# Patient Record
Sex: MALE | Employment: FULL TIME | URBAN - METROPOLITAN AREA
[De-identification: names, ages, dates, MRNs, and addresses within clinical notes are randomized per-mention and may not be internally consistent; named-entity substitution may affect disease eponyms.]

---

## 2019-04-11 ENCOUNTER — HOSPITAL ENCOUNTER (OUTPATIENT)
Dept: RADIOLOGY | Facility: MEDICAL CENTER | Age: 69
End: 2019-04-11
Attending: FAMILY MEDICINE
Payer: MEDICARE

## 2019-04-11 DIAGNOSIS — R52 PAIN: ICD-10-CM

## 2019-04-11 PROCEDURE — 73565 X-RAY EXAM OF KNEES: CPT

## 2021-01-25 DIAGNOSIS — Z23 NEED FOR VACCINATION: ICD-10-CM

## 2021-03-24 ENCOUNTER — HOSPITAL ENCOUNTER (OUTPATIENT)
Dept: LAB | Facility: MEDICAL CENTER | Age: 71
End: 2021-03-24
Attending: FAMILY MEDICINE
Payer: MEDICARE

## 2021-03-24 PROCEDURE — 80053 COMPREHEN METABOLIC PANEL: CPT

## 2021-03-24 PROCEDURE — 80061 LIPID PANEL: CPT

## 2021-03-24 PROCEDURE — 84153 ASSAY OF PSA TOTAL: CPT

## 2021-03-24 PROCEDURE — 36415 COLL VENOUS BLD VENIPUNCTURE: CPT

## 2021-03-24 PROCEDURE — 83036 HEMOGLOBIN GLYCOSYLATED A1C: CPT

## 2021-03-25 LAB
ALBUMIN SERPL BCP-MCNC: 4.2 G/DL (ref 3.2–4.9)
ALBUMIN/GLOB SERPL: 1.3 G/DL
ALP SERPL-CCNC: 74 U/L (ref 30–99)
ALT SERPL-CCNC: 10 U/L (ref 2–50)
ANION GAP SERPL CALC-SCNC: 10 MMOL/L (ref 7–16)
AST SERPL-CCNC: 17 U/L (ref 12–45)
BILIRUB SERPL-MCNC: 1 MG/DL (ref 0.1–1.5)
BUN SERPL-MCNC: 11 MG/DL (ref 8–22)
CALCIUM SERPL-MCNC: 9.5 MG/DL (ref 8.5–10.5)
CHLORIDE SERPL-SCNC: 105 MMOL/L (ref 96–112)
CHOLEST SERPL-MCNC: 117 MG/DL (ref 100–199)
CO2 SERPL-SCNC: 26 MMOL/L (ref 20–33)
CREAT SERPL-MCNC: 0.62 MG/DL (ref 0.5–1.4)
EST. AVERAGE GLUCOSE BLD GHB EST-MCNC: 120 MG/DL
FASTING STATUS PATIENT QL REPORTED: NORMAL
GLOBULIN SER CALC-MCNC: 3.3 G/DL (ref 1.9–3.5)
GLUCOSE SERPL-MCNC: 89 MG/DL (ref 65–99)
HBA1C MFR BLD: 5.8 % (ref 4–5.6)
HDLC SERPL-MCNC: 30 MG/DL
LDLC SERPL CALC-MCNC: 60 MG/DL
POTASSIUM SERPL-SCNC: 4.5 MMOL/L (ref 3.6–5.5)
PROT SERPL-MCNC: 7.5 G/DL (ref 6–8.2)
PSA SERPL-MCNC: 2.84 NG/ML (ref 0–4)
SODIUM SERPL-SCNC: 141 MMOL/L (ref 135–145)
TRIGL SERPL-MCNC: 133 MG/DL (ref 0–149)

## 2022-05-18 ENCOUNTER — HOSPITAL ENCOUNTER (EMERGENCY)
Facility: MEDICAL CENTER | Age: 72
End: 2022-05-18
Attending: EMERGENCY MEDICINE
Payer: MEDICARE

## 2022-05-18 VITALS
TEMPERATURE: 97 F | OXYGEN SATURATION: 94 % | SYSTOLIC BLOOD PRESSURE: 161 MMHG | RESPIRATION RATE: 18 BRPM | BODY MASS INDEX: 44.96 KG/M2 | WEIGHT: 279.76 LBS | DIASTOLIC BLOOD PRESSURE: 100 MMHG | HEART RATE: 60 BPM | HEIGHT: 66 IN

## 2022-05-18 DIAGNOSIS — I10 PRIMARY HYPERTENSION: ICD-10-CM

## 2022-05-18 LAB
ALBUMIN SERPL BCP-MCNC: 3.6 G/DL (ref 3.2–4.9)
ALBUMIN/GLOB SERPL: 1.1 G/DL
ALP SERPL-CCNC: 82 U/L (ref 30–99)
ALT SERPL-CCNC: 17 U/L (ref 2–50)
ANION GAP SERPL CALC-SCNC: 11 MMOL/L (ref 7–16)
AST SERPL-CCNC: 29 U/L (ref 12–45)
BASOPHILS # BLD AUTO: 0.7 % (ref 0–1.8)
BASOPHILS # BLD: 0.1 K/UL (ref 0–0.12)
BILIRUB SERPL-MCNC: 0.9 MG/DL (ref 0.1–1.5)
BUN SERPL-MCNC: 11 MG/DL (ref 8–22)
CALCIUM SERPL-MCNC: 8.5 MG/DL (ref 8.5–10.5)
CHLORIDE SERPL-SCNC: 104 MMOL/L (ref 96–112)
CO2 SERPL-SCNC: 21 MMOL/L (ref 20–33)
CREAT SERPL-MCNC: 0.68 MG/DL (ref 0.5–1.4)
EOSINOPHIL # BLD AUTO: 0.52 K/UL (ref 0–0.51)
EOSINOPHIL NFR BLD: 3.9 % (ref 0–6.9)
ERYTHROCYTE [DISTWIDTH] IN BLOOD BY AUTOMATED COUNT: 52.1 FL (ref 35.9–50)
GFR SERPLBLD CREATININE-BSD FMLA CKD-EPI: 99 ML/MIN/1.73 M 2
GLOBULIN SER CALC-MCNC: 3.3 G/DL (ref 1.9–3.5)
GLUCOSE SERPL-MCNC: 103 MG/DL (ref 65–99)
HCT VFR BLD AUTO: 46.6 % (ref 42–52)
HGB BLD-MCNC: 15.2 G/DL (ref 14–18)
IMM GRANULOCYTES # BLD AUTO: 0.06 K/UL (ref 0–0.11)
IMM GRANULOCYTES NFR BLD AUTO: 0.4 % (ref 0–0.9)
LYMPHOCYTES # BLD AUTO: 3.54 K/UL (ref 1–4.8)
LYMPHOCYTES NFR BLD: 26.5 % (ref 22–41)
MCH RBC QN AUTO: 28.4 PG (ref 27–33)
MCHC RBC AUTO-ENTMCNC: 32.6 G/DL (ref 33.7–35.3)
MCV RBC AUTO: 87.1 FL (ref 81.4–97.8)
MONOCYTES # BLD AUTO: 0.97 K/UL (ref 0–0.85)
MONOCYTES NFR BLD AUTO: 7.3 % (ref 0–13.4)
NEUTROPHILS # BLD AUTO: 8.17 K/UL (ref 1.82–7.42)
NEUTROPHILS NFR BLD: 61.2 % (ref 44–72)
NRBC # BLD AUTO: 0 K/UL
NRBC BLD-RTO: 0 /100 WBC
NT-PROBNP SERPL IA-MCNC: 299 PG/ML (ref 0–125)
PLATELET # BLD AUTO: 212 K/UL (ref 164–446)
PMV BLD AUTO: 11.1 FL (ref 9–12.9)
POTASSIUM SERPL-SCNC: 4 MMOL/L (ref 3.6–5.5)
PROT SERPL-MCNC: 6.9 G/DL (ref 6–8.2)
RBC # BLD AUTO: 5.35 M/UL (ref 4.7–6.1)
SODIUM SERPL-SCNC: 136 MMOL/L (ref 135–145)
WBC # BLD AUTO: 13.4 K/UL (ref 4.8–10.8)

## 2022-05-18 PROCEDURE — 99284 EMERGENCY DEPT VISIT MOD MDM: CPT

## 2022-05-18 PROCEDURE — 36415 COLL VENOUS BLD VENIPUNCTURE: CPT

## 2022-05-18 PROCEDURE — 83880 ASSAY OF NATRIURETIC PEPTIDE: CPT

## 2022-05-18 PROCEDURE — 80053 COMPREHEN METABOLIC PANEL: CPT

## 2022-05-18 PROCEDURE — 85025 COMPLETE CBC W/AUTO DIFF WBC: CPT

## 2022-05-18 RX ORDER — LISINOPRIL 40 MG/1
40 TABLET ORAL DAILY
Qty: 30 TABLET | Refills: 0 | Status: SHIPPED | OUTPATIENT
Start: 2022-05-18

## 2022-05-19 NOTE — ED TRIAGE NOTES
"Chief Complaint   Patient presents with   • Blood Pressure Problem     Pt states that his blood pressure is high. \"He had an episode on  and has been waiting for the cardiologist since then.\" Pt states that they have not been able to make an appointment until .     In triage, repeat blood pressures    Rt arm sittin/100  Lt arm sittin/98    Pt educated upon triage process and told to inform  staff of any changes in condition so that Pt may be reassessed. No further questions at this time. Pt sitting out in lobby.    "

## 2022-05-19 NOTE — ED NOTES
Pt is as triage note states. Pt ambulates from triage to TCS with a steady gait. Pt is AOx4 GCS 15. Pt is made aware to let staff know of any changes in current condition.

## 2022-05-19 NOTE — ED PROVIDER NOTES
"ED Provider Note    CHIEF COMPLAINT  Chief Complaint   Patient presents with   • Blood Pressure Problem     Pt states that his blood pressure is high. \"He had an episode on March 3rd and has been waiting for the cardiologist since then.\" Pt states that they have not been able to make an appointment until June 1st.       HPI  Eduardo Cuellar is a 72 y.o. male who presents for evaluation of elevated blood pressure.  The patient has a history of hypertension.  He is followed by PCP and is on lisinopril.  He is only on 20 mg of lisinopril.  Patient was referred by his PCP to a cardiologist and has an appointment in 2 weeks.  He has no report of any strokelike symptoms such as numbness weakness or tingling to the arms legs or face chest pain.  He does have some chronic leg swelling.    REVIEW OF SYSTEMS  See HPI for further details.  No chest pain numbness tingling weakness fevers chills dyspnea on exertion.  Positive for mild pedal edema all other systems are negative.     PAST MEDICAL HISTORY  No past medical history on file.  Hypertension  FAMILY HISTORY  Noncontributory    SOCIAL HISTORY  Social History     Socioeconomic History   • Marital status: Single   Tobacco Use   • Smoking status: Never Smoker   • Smokeless tobacco: Never Used   Substance and Sexual Activity   • Alcohol use: Not Currently   • Drug use: Never     Denies IV drugs  SURGICAL HISTORY  No past surgical history on file.  No major surgeries  CURRENT MEDICATIONS  Home Medications    **Home medications have not yet been reviewed for this encounter**     Lisinopril 20 mg once daily    ALLERGIES  No Known Allergies    PHYSICAL EXAM  VITAL SIGNS: BP (!) 155/98   Pulse 64   Temp 36.1 °C (96.9 °F) (Temporal)   Resp 16   Ht 1.676 m (5' 6\")   Wt (!) 127 kg (279 lb 12.2 oz)   SpO2 95%   BMI 45.16 kg/m²       Constitutional: Well developed, Well nourished, No acute distress, Non-toxic appearance.   HENT: Normocephalic, Atraumatic, Bilateral " external ears normal, Oropharynx moist, No oral exudates, Nose normal.   Eyes: PERRLA, EOMI, Conjunctiva normal, No discharge.   Neck: Normal range of motion, No tenderness, Supple, No stridor.   Cardiovascular: Normal heart rate, Normal rhythm, No murmurs, No rubs, No gallops.   Thorax & Lungs: Normal breath sounds, No respiratory distress, No wheezing, No chest tenderness.   Abdomen: Bowel sounds normal, Soft, No tenderness, No masses, No pulsatile masses.   Skin: Warm, Dry, No erythema, No rash.   Back: No tenderness, No CVA tenderness.   Extremities: Intact distal pulses, bilateral 2+ pitting edema   neurologic: Alert & oriented x 3, Normal motor function, Normal sensory function, No focal deficits noted.   Psychiatric: Anxious    Results for orders placed or performed during the hospital encounter of 05/18/22   CBC WITH DIFFERENTIAL   Result Value Ref Range    WBC 13.4 (H) 4.8 - 10.8 K/uL    RBC 5.35 4.70 - 6.10 M/uL    Hemoglobin 15.2 14.0 - 18.0 g/dL    Hematocrit 46.6 42.0 - 52.0 %    MCV 87.1 81.4 - 97.8 fL    MCH 28.4 27.0 - 33.0 pg    MCHC 32.6 (L) 33.7 - 35.3 g/dL    RDW 52.1 (H) 35.9 - 50.0 fL    Platelet Count 212 164 - 446 K/uL    MPV 11.1 9.0 - 12.9 fL    Neutrophils-Polys 61.20 44.00 - 72.00 %    Lymphocytes 26.50 22.00 - 41.00 %    Monocytes 7.30 0.00 - 13.40 %    Eosinophils 3.90 0.00 - 6.90 %    Basophils 0.70 0.00 - 1.80 %    Immature Granulocytes 0.40 0.00 - 0.90 %    Nucleated RBC 0.00 /100 WBC    Neutrophils (Absolute) 8.17 (H) 1.82 - 7.42 K/uL    Lymphs (Absolute) 3.54 1.00 - 4.80 K/uL    Monos (Absolute) 0.97 (H) 0.00 - 0.85 K/uL    Eos (Absolute) 0.52 (H) 0.00 - 0.51 K/uL    Baso (Absolute) 0.10 0.00 - 0.12 K/uL    Immature Granulocytes (abs) 0.06 0.00 - 0.11 K/uL    NRBC (Absolute) 0.00 K/uL   Comp Metabolic Panel   Result Value Ref Range    Sodium 136 135 - 145 mmol/L    Potassium 4.0 3.6 - 5.5 mmol/L    Chloride 104 96 - 112 mmol/L    Co2 21 20 - 33 mmol/L    Anion Gap 11.0 7.0 - 16.0     Glucose 103 (H) 65 - 99 mg/dL    Bun 11 8 - 22 mg/dL    Creatinine 0.68 0.50 - 1.40 mg/dL    Calcium 8.5 8.5 - 10.5 mg/dL    AST(SGOT) 29 12 - 45 U/L    ALT(SGPT) 17 2 - 50 U/L    Alkaline Phosphatase 82 30 - 99 U/L    Total Bilirubin 0.9 0.1 - 1.5 mg/dL    Albumin 3.6 3.2 - 4.9 g/dL    Total Protein 6.9 6.0 - 8.2 g/dL    Globulin 3.3 1.9 - 3.5 g/dL    A-G Ratio 1.1 g/dL   proBrain Natriuretic Peptide, NT   Result Value Ref Range    NT-proBNP 299 (H) 0 - 125 pg/mL   ESTIMATED GFR   Result Value Ref Range    GFR (CKD-EPI) 99 >60 mL/min/1.73 m 2       COURSE & MEDICAL DECISION MAKING  Pertinent Labs & Imaging studies reviewed. (See chart for details)  Patient presents here with chronically elevated blood pressure.  I reviewed his records and he has not had any blood work done for over a year.  I felt we needed to check some basic labs including a CBC and metabolic panel to check for kidney function.  This appears to be preserved.  I think that we can increase his lisinopril to 40 mg once daily and he can follow-up with cardiology in 2 weeks as planned    FINAL IMPRESSION  1.  Hypertension         Electronically signed by: Sergei Worthy M.D., 5/18/2022 6:07 PM

## 2022-05-19 NOTE — ED NOTES
Pt resting comfortable with famil. Pt aware to let staff know of any changes in current condition.

## 2022-05-19 NOTE — ED NOTES
Discharge instructions reviewed with PT. PT verbalized understanding. PT instructed to follow discharge instructions and if symptoms worsen to return to the ED. PT ambulated without assistance out of ED.

## 2025-03-21 ENCOUNTER — HOSPITAL ENCOUNTER (INPATIENT)
Facility: MEDICAL CENTER | Age: 75
End: 2025-03-21
Attending: STUDENT IN AN ORGANIZED HEALTH CARE EDUCATION/TRAINING PROGRAM | Admitting: STUDENT IN AN ORGANIZED HEALTH CARE EDUCATION/TRAINING PROGRAM
Payer: MEDICARE

## 2025-03-21 ENCOUNTER — HOSPITAL ENCOUNTER (OUTPATIENT)
Dept: RADIOLOGY | Facility: MEDICAL CENTER | Age: 75
End: 2025-03-21
Payer: MEDICARE

## 2025-03-21 DIAGNOSIS — K56.609 SBO (SMALL BOWEL OBSTRUCTION) (HCC): ICD-10-CM

## 2025-03-21 DIAGNOSIS — K81.0 GALLBLADDER ABSCESS: ICD-10-CM

## 2025-03-21 DIAGNOSIS — Z90.49 S/P CHOLECYSTECTOMY: ICD-10-CM

## 2025-03-21 PROBLEM — I82.409 DVT (DEEP VENOUS THROMBOSIS) (HCC): Status: ACTIVE | Noted: 2025-03-21

## 2025-03-21 PROBLEM — R65.10 SIRS (SYSTEMIC INFLAMMATORY RESPONSE SYNDROME) (HCC): Status: ACTIVE | Noted: 2025-03-21

## 2025-03-21 PROBLEM — T88.8XXA FLUID COLLECTION AT SURGICAL SITE: Status: ACTIVE | Noted: 2025-03-21

## 2025-03-21 PROBLEM — Z71.89 ACP (ADVANCE CARE PLANNING): Status: ACTIVE | Noted: 2025-03-21

## 2025-03-21 PROBLEM — R10.9 ABDOMINAL PAIN: Status: ACTIVE | Noted: 2025-03-21

## 2025-03-21 PROBLEM — I50.9 CHF (CONGESTIVE HEART FAILURE) (HCC): Status: ACTIVE | Noted: 2025-03-21

## 2025-03-21 LAB
ALBUMIN SERPL BCP-MCNC: 3 G/DL (ref 3.2–4.9)
ALBUMIN/GLOB SERPL: 0.8 G/DL
ALP SERPL-CCNC: 165 U/L (ref 30–99)
ALT SERPL-CCNC: 9 U/L (ref 2–50)
ANION GAP SERPL CALC-SCNC: 12 MMOL/L (ref 7–16)
AST SERPL-CCNC: 36 U/L (ref 12–45)
BASOPHILS # BLD AUTO: 0.6 % (ref 0–1.8)
BASOPHILS # BLD: 0.11 K/UL (ref 0–0.12)
BILIRUB SERPL-MCNC: 0.9 MG/DL (ref 0.1–1.5)
BUN SERPL-MCNC: 14 MG/DL (ref 8–22)
CALCIUM ALBUM COR SERPL-MCNC: 9.2 MG/DL (ref 8.5–10.5)
CALCIUM SERPL-MCNC: 8.4 MG/DL (ref 8.5–10.5)
CHLORIDE SERPL-SCNC: 98 MMOL/L (ref 96–112)
CO2 SERPL-SCNC: 27 MMOL/L (ref 20–33)
CREAT SERPL-MCNC: 0.73 MG/DL (ref 0.5–1.4)
EOSINOPHIL # BLD AUTO: 0.22 K/UL (ref 0–0.51)
EOSINOPHIL NFR BLD: 1.1 % (ref 0–6.9)
ERYTHROCYTE [DISTWIDTH] IN BLOOD BY AUTOMATED COUNT: 45 FL (ref 35.9–50)
GFR SERPLBLD CREATININE-BSD FMLA CKD-EPI: 95 ML/MIN/1.73 M 2
GLOBULIN SER CALC-MCNC: 3.9 G/DL (ref 1.9–3.5)
GLUCOSE SERPL-MCNC: 103 MG/DL (ref 65–99)
HCT VFR BLD AUTO: 41.5 % (ref 42–52)
HGB BLD-MCNC: 13.7 G/DL (ref 14–18)
IMM GRANULOCYTES # BLD AUTO: 0.6 K/UL (ref 0–0.11)
IMM GRANULOCYTES NFR BLD AUTO: 3.1 % (ref 0–0.9)
INR PPP: 1.09 (ref 0.87–1.13)
LACTATE SERPL-SCNC: 1.4 MMOL/L (ref 0.5–2)
LYMPHOCYTES # BLD AUTO: 2.82 K/UL (ref 1–4.8)
LYMPHOCYTES NFR BLD: 14.7 % (ref 22–41)
MCH RBC QN AUTO: 27.6 PG (ref 27–33)
MCHC RBC AUTO-ENTMCNC: 33 G/DL (ref 32.3–36.5)
MCV RBC AUTO: 83.7 FL (ref 81.4–97.8)
MONOCYTES # BLD AUTO: 1.16 K/UL (ref 0–0.85)
MONOCYTES NFR BLD AUTO: 6.1 % (ref 0–13.4)
NEUTROPHILS # BLD AUTO: 14.24 K/UL (ref 1.82–7.42)
NEUTROPHILS NFR BLD: 74.4 % (ref 44–72)
NRBC # BLD AUTO: 0 K/UL
NRBC BLD-RTO: 0 /100 WBC (ref 0–0.2)
PLATELET # BLD AUTO: 480 K/UL (ref 164–446)
PMV BLD AUTO: 9.7 FL (ref 9–12.9)
POTASSIUM SERPL-SCNC: 3.3 MMOL/L (ref 3.6–5.5)
PROCALCITONIN SERPL-MCNC: 0.19 NG/ML
PROT SERPL-MCNC: 6.9 G/DL (ref 6–8.2)
PROTHROMBIN TIME: 14.1 SEC (ref 12–14.6)
RBC # BLD AUTO: 4.96 M/UL (ref 4.7–6.1)
SODIUM SERPL-SCNC: 137 MMOL/L (ref 135–145)
WBC # BLD AUTO: 19.2 K/UL (ref 4.8–10.8)

## 2025-03-21 PROCEDURE — 80053 COMPREHEN METABOLIC PANEL: CPT

## 2025-03-21 PROCEDURE — 93005 ELECTROCARDIOGRAM TRACING: CPT | Mod: TC | Performed by: STUDENT IN AN ORGANIZED HEALTH CARE EDUCATION/TRAINING PROGRAM

## 2025-03-21 PROCEDURE — 770001 HCHG ROOM/CARE - MED/SURG/GYN PRIV*

## 2025-03-21 PROCEDURE — 36415 COLL VENOUS BLD VENIPUNCTURE: CPT

## 2025-03-21 PROCEDURE — 99223 1ST HOSP IP/OBS HIGH 75: CPT | Mod: 25,AI | Performed by: STUDENT IN AN ORGANIZED HEALTH CARE EDUCATION/TRAINING PROGRAM

## 2025-03-21 PROCEDURE — 87040 BLOOD CULTURE FOR BACTERIA: CPT

## 2025-03-21 PROCEDURE — 700105 HCHG RX REV CODE 258: Performed by: STUDENT IN AN ORGANIZED HEALTH CARE EDUCATION/TRAINING PROGRAM

## 2025-03-21 PROCEDURE — 85610 PROTHROMBIN TIME: CPT

## 2025-03-21 PROCEDURE — 700111 HCHG RX REV CODE 636 W/ 250 OVERRIDE (IP): Performed by: STUDENT IN AN ORGANIZED HEALTH CARE EDUCATION/TRAINING PROGRAM

## 2025-03-21 PROCEDURE — 99497 ADVNCD CARE PLAN 30 MIN: CPT | Performed by: STUDENT IN AN ORGANIZED HEALTH CARE EDUCATION/TRAINING PROGRAM

## 2025-03-21 PROCEDURE — 83605 ASSAY OF LACTIC ACID: CPT

## 2025-03-21 PROCEDURE — 84145 PROCALCITONIN (PCT): CPT

## 2025-03-21 PROCEDURE — 85025 COMPLETE CBC W/AUTO DIFF WBC: CPT

## 2025-03-21 RX ORDER — ONDANSETRON 2 MG/ML
4 INJECTION INTRAMUSCULAR; INTRAVENOUS EVERY 4 HOURS PRN
Status: DISCONTINUED | OUTPATIENT
Start: 2025-03-21 | End: 2025-04-03 | Stop reason: HOSPADM

## 2025-03-21 RX ORDER — OXYCODONE HYDROCHLORIDE 5 MG/1
2.5 TABLET ORAL
Refills: 0 | Status: DISCONTINUED | OUTPATIENT
Start: 2025-03-21 | End: 2025-04-03 | Stop reason: HOSPADM

## 2025-03-21 RX ORDER — ACETAMINOPHEN 325 MG/1
650 TABLET ORAL EVERY 6 HOURS PRN
Status: DISCONTINUED | OUTPATIENT
Start: 2025-03-21 | End: 2025-04-03 | Stop reason: HOSPADM

## 2025-03-21 RX ORDER — ONDANSETRON 4 MG/1
4 TABLET, ORALLY DISINTEGRATING ORAL EVERY 4 HOURS PRN
Status: DISCONTINUED | OUTPATIENT
Start: 2025-03-21 | End: 2025-04-03 | Stop reason: HOSPADM

## 2025-03-21 RX ORDER — HYDROMORPHONE HYDROCHLORIDE 1 MG/ML
0.25 INJECTION, SOLUTION INTRAMUSCULAR; INTRAVENOUS; SUBCUTANEOUS
Status: DISCONTINUED | OUTPATIENT
Start: 2025-03-21 | End: 2025-03-30

## 2025-03-21 RX ORDER — SODIUM CHLORIDE 9 MG/ML
INJECTION, SOLUTION INTRAVENOUS CONTINUOUS
Status: DISCONTINUED | OUTPATIENT
Start: 2025-03-21 | End: 2025-03-22

## 2025-03-21 RX ORDER — LABETALOL HYDROCHLORIDE 5 MG/ML
10 INJECTION, SOLUTION INTRAVENOUS EVERY 4 HOURS PRN
Status: DISCONTINUED | OUTPATIENT
Start: 2025-03-21 | End: 2025-04-03 | Stop reason: HOSPADM

## 2025-03-21 RX ORDER — CARVEDILOL 6.25 MG/1
6.25 TABLET ORAL 2 TIMES DAILY WITH MEALS
Status: DISCONTINUED | OUTPATIENT
Start: 2025-03-22 | End: 2025-04-03 | Stop reason: HOSPADM

## 2025-03-21 RX ORDER — POTASSIUM CHLORIDE 1500 MG/1
40 TABLET, EXTENDED RELEASE ORAL
Status: COMPLETED | OUTPATIENT
Start: 2025-03-22 | End: 2025-03-22

## 2025-03-21 RX ORDER — OXYCODONE HYDROCHLORIDE 5 MG/1
5 TABLET ORAL
Refills: 0 | Status: DISCONTINUED | OUTPATIENT
Start: 2025-03-21 | End: 2025-04-03 | Stop reason: HOSPADM

## 2025-03-21 RX ORDER — FUROSEMIDE 10 MG/ML
40 INJECTION INTRAMUSCULAR; INTRAVENOUS DAILY
Status: DISCONTINUED | OUTPATIENT
Start: 2025-03-21 | End: 2025-03-25

## 2025-03-21 RX ORDER — LOSARTAN POTASSIUM 50 MG/1
50 TABLET ORAL
Status: DISCONTINUED | OUTPATIENT
Start: 2025-03-22 | End: 2025-04-03 | Stop reason: HOSPADM

## 2025-03-21 RX ADMIN — SODIUM CHLORIDE: 9 INJECTION, SOLUTION INTRAVENOUS at 23:25

## 2025-03-21 RX ADMIN — FUROSEMIDE 40 MG: 10 INJECTION INTRAMUSCULAR; INTRAVENOUS at 23:22

## 2025-03-21 RX ADMIN — PIPERACILLIN AND TAZOBACTAM 3.38 G: 3; .375 INJECTION, POWDER, FOR SOLUTION INTRAVENOUS at 23:28

## 2025-03-21 SDOH — ECONOMIC STABILITY: TRANSPORTATION INSECURITY
IN THE PAST 12 MONTHS, HAS THE LACK OF TRANSPORTATION KEPT YOU FROM MEDICAL APPOINTMENTS OR FROM GETTING MEDICATIONS?: NO

## 2025-03-21 SDOH — ECONOMIC STABILITY: TRANSPORTATION INSECURITY
IN THE PAST 12 MONTHS, HAS LACK OF RELIABLE TRANSPORTATION KEPT YOU FROM MEDICAL APPOINTMENTS, MEETINGS, WORK OR FROM GETTING THINGS NEEDED FOR DAILY LIVING?: NO

## 2025-03-21 ASSESSMENT — LIFESTYLE VARIABLES
ALCOHOL_USE: YES
EVER FELT BAD OR GUILTY ABOUT YOUR DRINKING: NO
HAVE PEOPLE ANNOYED YOU BY CRITICIZING YOUR DRINKING: NO
TOTAL SCORE: 0
CONSUMPTION TOTAL: POSITIVE
AVERAGE NUMBER OF DAYS PER WEEK YOU HAVE A DRINK CONTAINING ALCOHOL: 0
HOW MANY TIMES IN THE PAST YEAR HAVE YOU HAD 5 OR MORE DRINKS IN A DAY: 1
TOTAL SCORE: 0
DOES PATIENT WANT TO STOP DRINKING: NO
EVER HAD A DRINK FIRST THING IN THE MORNING TO STEADY YOUR NERVES TO GET RID OF A HANGOVER: NO
ON A TYPICAL DAY WHEN YOU DRINK ALCOHOL HOW MANY DRINKS DO YOU HAVE: 0
HAVE YOU EVER FELT YOU SHOULD CUT DOWN ON YOUR DRINKING: NO
TOTAL SCORE: 0

## 2025-03-21 ASSESSMENT — SOCIAL DETERMINANTS OF HEALTH (SDOH)
WITHIN THE LAST YEAR, HAVE YOU BEEN AFRAID OF YOUR PARTNER OR EX-PARTNER?: NO
IN THE PAST 12 MONTHS, HAS THE ELECTRIC, GAS, OIL, OR WATER COMPANY THREATENED TO SHUT OFF SERVICE IN YOUR HOME?: NO
WITHIN THE LAST YEAR, HAVE YOU BEEN AFRAID OF YOUR PARTNER OR EX-PARTNER?: NO
WITHIN THE LAST YEAR, HAVE TO BEEN RAPED OR FORCED TO HAVE ANY KIND OF SEXUAL ACTIVITY BY YOUR PARTNER OR EX-PARTNER?: NO
WITHIN THE LAST YEAR, HAVE YOU BEEN KICKED, HIT, SLAPPED, OR OTHERWISE PHYSICALLY HURT BY YOUR PARTNER OR EX-PARTNER?: NO
WITHIN THE PAST 12 MONTHS, YOU WORRIED THAT YOUR FOOD WOULD RUN OUT BEFORE YOU GOT THE MONEY TO BUY MORE: NEVER TRUE
WITHIN THE LAST YEAR, HAVE YOU BEEN HUMILIATED OR EMOTIONALLY ABUSED IN OTHER WAYS BY YOUR PARTNER OR EX-PARTNER?: NO
WITHIN THE PAST 12 MONTHS, THE FOOD YOU BOUGHT JUST DIDN'T LAST AND YOU DIDN'T HAVE MONEY TO GET MORE: NEVER TRUE
WITHIN THE LAST YEAR, HAVE YOU BEEN KICKED, HIT, SLAPPED, OR OTHERWISE PHYSICALLY HURT BY YOUR PARTNER OR EX-PARTNER?: NO
WITHIN THE LAST YEAR, HAVE TO BEEN RAPED OR FORCED TO HAVE ANY KIND OF SEXUAL ACTIVITY BY YOUR PARTNER OR EX-PARTNER?: NO
WITHIN THE LAST YEAR, HAVE YOU BEEN HUMILIATED OR EMOTIONALLY ABUSED IN OTHER WAYS BY YOUR PARTNER OR EX-PARTNER?: NO

## 2025-03-21 ASSESSMENT — PAIN DESCRIPTION - PAIN TYPE: TYPE: ACUTE PAIN

## 2025-03-21 ASSESSMENT — PATIENT HEALTH QUESTIONNAIRE - PHQ9
SUM OF ALL RESPONSES TO PHQ9 QUESTIONS 1 AND 2: 0
2. FEELING DOWN, DEPRESSED, IRRITABLE, OR HOPELESS: NOT AT ALL
1. LITTLE INTEREST OR PLEASURE IN DOING THINGS: NOT AT ALL

## 2025-03-22 ENCOUNTER — APPOINTMENT (OUTPATIENT)
Dept: RADIOLOGY | Facility: MEDICAL CENTER | Age: 75
DRG: 862 | End: 2025-03-22
Attending: STUDENT IN AN ORGANIZED HEALTH CARE EDUCATION/TRAINING PROGRAM
Payer: MEDICARE

## 2025-03-22 PROBLEM — K56.609 SBO (SMALL BOWEL OBSTRUCTION) (HCC): Status: ACTIVE | Noted: 2025-03-22

## 2025-03-22 PROBLEM — Z86.711 HISTORY OF PULMONARY EMBOLISM: Status: ACTIVE | Noted: 2025-03-22

## 2025-03-22 LAB
ALBUMIN SERPL BCP-MCNC: 2.6 G/DL (ref 3.2–4.9)
ALBUMIN/GLOB SERPL: 0.7 G/DL
ALP SERPL-CCNC: 119 U/L (ref 30–99)
ALT SERPL-CCNC: 10 U/L (ref 2–50)
ANION GAP SERPL CALC-SCNC: 11 MMOL/L (ref 7–16)
AST SERPL-CCNC: 36 U/L (ref 12–45)
BILIRUB SERPL-MCNC: 1.1 MG/DL (ref 0.1–1.5)
BUN SERPL-MCNC: 14 MG/DL (ref 8–22)
CALCIUM ALBUM COR SERPL-MCNC: 9.3 MG/DL (ref 8.5–10.5)
CALCIUM SERPL-MCNC: 8.2 MG/DL (ref 8.5–10.5)
CHLORIDE SERPL-SCNC: 100 MMOL/L (ref 96–112)
CO2 SERPL-SCNC: 27 MMOL/L (ref 20–33)
CREAT SERPL-MCNC: 0.74 MG/DL (ref 0.5–1.4)
EKG IMPRESSION: NORMAL
ERYTHROCYTE [DISTWIDTH] IN BLOOD BY AUTOMATED COUNT: 46.4 FL (ref 35.9–50)
GFR SERPLBLD CREATININE-BSD FMLA CKD-EPI: 95 ML/MIN/1.73 M 2
GLOBULIN SER CALC-MCNC: 3.8 G/DL (ref 1.9–3.5)
GLUCOSE SERPL-MCNC: 97 MG/DL (ref 65–99)
GRAM STN SPEC: NORMAL
HCT VFR BLD AUTO: 40.3 % (ref 42–52)
HGB BLD-MCNC: 13.2 G/DL (ref 14–18)
LACTATE SERPL-SCNC: 1 MMOL/L (ref 0.5–2)
LACTATE SERPL-SCNC: 1.3 MMOL/L (ref 0.5–2)
LACTATE SERPL-SCNC: 1.8 MMOL/L (ref 0.5–2)
MAGNESIUM SERPL-MCNC: 2.1 MG/DL (ref 1.5–2.5)
MCH RBC QN AUTO: 27.8 PG (ref 27–33)
MCHC RBC AUTO-ENTMCNC: 32.8 G/DL (ref 32.3–36.5)
MCV RBC AUTO: 85 FL (ref 81.4–97.8)
NT-PROBNP SERPL IA-MCNC: 986 PG/ML (ref 0–125)
PHOSPHATE SERPL-MCNC: 3.4 MG/DL (ref 2.5–4.5)
PLATELET # BLD AUTO: 435 K/UL (ref 164–446)
PMV BLD AUTO: 9.4 FL (ref 9–12.9)
POTASSIUM SERPL-SCNC: 4.2 MMOL/L (ref 3.6–5.5)
PROT SERPL-MCNC: 6.4 G/DL (ref 6–8.2)
RBC # BLD AUTO: 4.74 M/UL (ref 4.7–6.1)
SIGNIFICANT IND 70042: NORMAL
SITE SITE: NORMAL
SODIUM SERPL-SCNC: 138 MMOL/L (ref 135–145)
SOURCE SOURCE: NORMAL
WBC # BLD AUTO: 19.4 K/UL (ref 4.8–10.8)

## 2025-03-22 PROCEDURE — 80053 COMPREHEN METABOLIC PANEL: CPT

## 2025-03-22 PROCEDURE — 87186 SC STD MICRODIL/AGAR DIL: CPT

## 2025-03-22 PROCEDURE — 71045 X-RAY EXAM CHEST 1 VIEW: CPT

## 2025-03-22 PROCEDURE — 99152 MOD SED SAME PHYS/QHP 5/>YRS: CPT

## 2025-03-22 PROCEDURE — 78226 HEPATOBILIARY SYSTEM IMAGING: CPT

## 2025-03-22 PROCEDURE — 36415 COLL VENOUS BLD VENIPUNCTURE: CPT

## 2025-03-22 PROCEDURE — 87077 CULTURE AEROBIC IDENTIFY: CPT

## 2025-03-22 PROCEDURE — 85027 COMPLETE CBC AUTOMATED: CPT

## 2025-03-22 PROCEDURE — 83880 ASSAY OF NATRIURETIC PEPTIDE: CPT

## 2025-03-22 PROCEDURE — 700111 HCHG RX REV CODE 636 W/ 250 OVERRIDE (IP): Mod: JZ | Performed by: STUDENT IN AN ORGANIZED HEALTH CARE EDUCATION/TRAINING PROGRAM

## 2025-03-22 PROCEDURE — 83605 ASSAY OF LACTIC ACID: CPT | Mod: 91

## 2025-03-22 PROCEDURE — 700111 HCHG RX REV CODE 636 W/ 250 OVERRIDE (IP): Mod: JZ

## 2025-03-22 PROCEDURE — 84100 ASSAY OF PHOSPHORUS: CPT

## 2025-03-22 PROCEDURE — 770001 HCHG ROOM/CARE - MED/SURG/GYN PRIV*

## 2025-03-22 PROCEDURE — 93010 ELECTROCARDIOGRAM REPORT: CPT | Performed by: INTERNAL MEDICINE

## 2025-03-22 PROCEDURE — A9270 NON-COVERED ITEM OR SERVICE: HCPCS | Performed by: STUDENT IN AN ORGANIZED HEALTH CARE EDUCATION/TRAINING PROGRAM

## 2025-03-22 PROCEDURE — 0W9G30Z DRAINAGE OF PERITONEAL CAVITY WITH DRAINAGE DEVICE, PERCUTANEOUS APPROACH: ICD-10-PCS | Performed by: RADIOLOGY

## 2025-03-22 PROCEDURE — 99233 SBSQ HOSP IP/OBS HIGH 50: CPT | Performed by: STUDENT IN AN ORGANIZED HEALTH CARE EDUCATION/TRAINING PROGRAM

## 2025-03-22 PROCEDURE — 700102 HCHG RX REV CODE 250 W/ 637 OVERRIDE(OP): Performed by: STUDENT IN AN ORGANIZED HEALTH CARE EDUCATION/TRAINING PROGRAM

## 2025-03-22 PROCEDURE — 83735 ASSAY OF MAGNESIUM: CPT

## 2025-03-22 PROCEDURE — 700111 HCHG RX REV CODE 636 W/ 250 OVERRIDE (IP): Mod: JZ | Performed by: RADIOLOGY

## 2025-03-22 PROCEDURE — 87070 CULTURE OTHR SPECIMN AEROBIC: CPT

## 2025-03-22 PROCEDURE — 87205 SMEAR GRAM STAIN: CPT

## 2025-03-22 PROCEDURE — 700105 HCHG RX REV CODE 258: Performed by: STUDENT IN AN ORGANIZED HEALTH CARE EDUCATION/TRAINING PROGRAM

## 2025-03-22 RX ORDER — FINASTERIDE 5 MG/1
5 TABLET, FILM COATED ORAL DAILY
COMMUNITY
Start: 2025-02-05

## 2025-03-22 RX ORDER — CARVEDILOL 6.25 MG/1
6.25 TABLET ORAL 2 TIMES DAILY
COMMUNITY
Start: 2025-02-05

## 2025-03-22 RX ORDER — OXYBUTYNIN CHLORIDE 10 MG/1
10 TABLET, EXTENDED RELEASE ORAL DAILY
COMMUNITY
Start: 2025-03-20

## 2025-03-22 RX ORDER — IBUPROFEN 200 MG
200 TABLET ORAL EVERY 6 HOURS PRN
COMMUNITY

## 2025-03-22 RX ORDER — MIDAZOLAM HYDROCHLORIDE 1 MG/ML
INJECTION INTRAMUSCULAR; INTRAVENOUS
Status: COMPLETED
Start: 2025-03-22 | End: 2025-03-22

## 2025-03-22 RX ORDER — TAMSULOSIN HYDROCHLORIDE 0.4 MG/1
0.4 CAPSULE ORAL DAILY
COMMUNITY
Start: 2025-01-05

## 2025-03-22 RX ORDER — MIDAZOLAM HYDROCHLORIDE 1 MG/ML
.5-2 INJECTION INTRAMUSCULAR; INTRAVENOUS PRN
Status: ACTIVE | OUTPATIENT
Start: 2025-03-22 | End: 2025-03-22

## 2025-03-22 RX ORDER — FUROSEMIDE 40 MG/1
40 TABLET ORAL DAILY
COMMUNITY
Start: 2025-02-05

## 2025-03-22 RX ORDER — ONDANSETRON 2 MG/ML
4 INJECTION INTRAMUSCULAR; INTRAVENOUS PRN
Status: ACTIVE | OUTPATIENT
Start: 2025-03-22 | End: 2025-03-22

## 2025-03-22 RX ORDER — LOSARTAN POTASSIUM 25 MG/1
25 TABLET ORAL DAILY
COMMUNITY
Start: 2025-03-01

## 2025-03-22 RX ORDER — SODIUM CHLORIDE 9 MG/ML
500 INJECTION, SOLUTION INTRAVENOUS
Status: ACTIVE | OUTPATIENT
Start: 2025-03-22 | End: 2025-03-22

## 2025-03-22 RX ORDER — ATORVASTATIN CALCIUM 20 MG/1
20 TABLET, FILM COATED ORAL DAILY
COMMUNITY
Start: 2025-02-19

## 2025-03-22 RX ADMIN — FENTANYL CITRATE 50 MCG: 50 INJECTION, SOLUTION INTRAMUSCULAR; INTRAVENOUS at 09:22

## 2025-03-22 RX ADMIN — OXYCODONE 5 MG: 5 TABLET ORAL at 20:30

## 2025-03-22 RX ADMIN — MIDAZOLAM HYDROCHLORIDE 1 MG: 1 INJECTION, SOLUTION INTRAMUSCULAR; INTRAVENOUS at 09:23

## 2025-03-22 RX ADMIN — PIPERACILLIN AND TAZOBACTAM 3.38 G: 3; .375 INJECTION, POWDER, FOR SOLUTION INTRAVENOUS at 22:49

## 2025-03-22 RX ADMIN — PIPERACILLIN AND TAZOBACTAM 3.38 G: 3; .375 INJECTION, POWDER, FOR SOLUTION INTRAVENOUS at 06:17

## 2025-03-22 RX ADMIN — MIDAZOLAM HYDROCHLORIDE 1 MG: 1 INJECTION, SOLUTION INTRAMUSCULAR; INTRAVENOUS at 09:22

## 2025-03-22 RX ADMIN — POTASSIUM CHLORIDE 40 MEQ: 1500 TABLET, EXTENDED RELEASE ORAL at 00:01

## 2025-03-22 RX ADMIN — PIPERACILLIN AND TAZOBACTAM 3.38 G: 3; .375 INJECTION, POWDER, FOR SOLUTION INTRAVENOUS at 14:32

## 2025-03-22 RX ADMIN — OXYCODONE 5 MG: 5 TABLET ORAL at 13:04

## 2025-03-22 RX ADMIN — FENTANYL CITRATE 50 MCG: 50 INJECTION, SOLUTION INTRAMUSCULAR; INTRAVENOUS at 09:30

## 2025-03-22 RX ADMIN — POTASSIUM CHLORIDE 40 MEQ: 1500 TABLET, EXTENDED RELEASE ORAL at 01:06

## 2025-03-22 ASSESSMENT — PAIN DESCRIPTION - PAIN TYPE
TYPE: ACUTE PAIN
TYPE: ACUTE PAIN;SURGICAL PAIN
TYPE: ACUTE PAIN
TYPE: ACUTE PAIN
TYPE: SURGICAL PAIN
TYPE: ACUTE PAIN
TYPE: ACUTE PAIN

## 2025-03-22 ASSESSMENT — COGNITIVE AND FUNCTIONAL STATUS - GENERAL
SUGGESTED CMS G CODE MODIFIER DAILY ACTIVITY: CH
MOVING FROM LYING ON BACK TO SITTING ON SIDE OF FLAT BED: A LITTLE
MOBILITY SCORE: 21
CLIMB 3 TO 5 STEPS WITH RAILING: A LITTLE
TURNING FROM BACK TO SIDE WHILE IN FLAT BAD: A LITTLE
DAILY ACTIVITIY SCORE: 24
SUGGESTED CMS G CODE MODIFIER MOBILITY: CJ

## 2025-03-22 ASSESSMENT — SOCIAL DETERMINANTS OF HEALTH (SDOH)
WITHIN THE LAST YEAR, HAVE TO BEEN RAPED OR FORCED TO HAVE ANY KIND OF SEXUAL ACTIVITY BY YOUR PARTNER OR EX-PARTNER?: NO
WITHIN THE LAST YEAR, HAVE YOU BEEN KICKED, HIT, SLAPPED, OR OTHERWISE PHYSICALLY HURT BY YOUR PARTNER OR EX-PARTNER?: NO
WITHIN THE LAST YEAR, HAVE YOU BEEN AFRAID OF YOUR PARTNER OR EX-PARTNER?: NO
WITHIN THE LAST YEAR, HAVE YOU BEEN HUMILIATED OR EMOTIONALLY ABUSED IN OTHER WAYS BY YOUR PARTNER OR EX-PARTNER?: NO

## 2025-03-22 NOTE — PROGRESS NOTES
Bedside report received.  Assessment complete.  A&O x 4. Patient calls appropriately.  Patient ambulates with standby assist. Bed alarm off.   Patient has 4/10 pain. Patient medicated per MAR.  Denies N&V. Tolerating NPO with sips diet.  + void, + flatus, - BM.  Patient denies SOB.  SCD's refused.  Review plan with of care with patient. Call light and personal belongings within reach. Hourly rounding in place. All needs met at this time.    Patient down to IR with transport.

## 2025-03-22 NOTE — PROGRESS NOTES
Pharmacy Medication Reconciliation      ~Medication reconciliation updated and complete per patient &  service Low  ~Allergies have been verified and updated     ~Is dispense history available in EPIC: yes  ~Patient home pharmacy :  [facility] [phone]      ~Anticoagulants (rivaroxaban, apixaban, edoxaban, dabigatran, warfarin, enoxaparin) taken in the last 14 days? YES  ~Anticoagulant: Eliquis 5mg, Last dose: 3/21/25 am    Patient had a 2 day course of Augmentin 875-125 BID started 3/15/25 completed 3/16/25 PM

## 2025-03-22 NOTE — CONSULTS
DATE OF SERVICE:  03/22/2025     GENERAL SURGICAL CONSULTATION     REQUESTING PHYSICIAN:  Dr. Yamilet Reed.     REASON FOR CONSULTATION:  The patient is a 75-year-old male who underwent a   laparoscopic cholecystectomy by Dr. Yadav on 05/13 at [facility].    He subsequently came in and was found to have a 10 cm fluid collection in the   gallbladder fossa.  He was admitted for IR drain, but was transferred to   [facility] because of IR availability over the weekend. He was started on IV   antibiotics.  He has already had his drain put in and I am coming to see him   for surgical followup.     PAST MEDICAL HISTORY:  ILLNESSES:  History of morbid obesity, congestive heart failure, pulmonary   embolism.  He is on Eliquis regularly.     PAST SURGICAL HISTORY:  Laparoscopic cholecystectomy.     MEDICATIONS:  Currently are Prinivil and Eliquis.     ALLERGIES:  None.     PHYSICAL EXAMINATION:  VITAL SIGNS:  He is afebrile.  HEENT:  He is anicteric.  NECK:  Supple.  ABDOMEN:  Soft and is mildly distended.  He has some tenderness in the right   upper quadrant.     LABORATORY DATA:  His white count on admission was 19,000, hemoglobin is 13.7.   INR was normal.  CT scan demonstrated a 10 cm fluid collection in the   gallbladder fossa.     IMPRESSION:  A 75-year-old male status post laparoscopic cholecystectomy with   postoperative fluid collection.     PLAN:  He has had a HIDA scan, which demonstrated no evidence of bile leak.    This most likely is postoperative fluid.  The drain has been placed.  Agree   with antibiotics until cultures are returned.  He can have a regular diet.  We   will follow along.        ______________________________  MD VIOLETTE HUYNH/ISAAC     DD:  03/22/2025 16:18  DT:  03/22/2025 16:39    Job#:  [id number]

## 2025-03-22 NOTE — ASSESSMENT & PLAN NOTE
Patient noted to have a 10 cm fluid collection at the gallbladder fossa  Recent cholecystitis status post cholecystectomy  General surgery was consulted, recommends evaluation for bile leak, IR consult for drainage of fluid.    S/p HIDA 3/22, negative for bile leak.  S/p  IR drain 3/22, output of 5 cc  Gallbladder cultures positive for Enterococcus faecalis, pansensitive.  DC IV Zosyn - switch to amoxicillin    3/27  Repeat CT A/P showed decreased gallbladder fossa fluid-filled collection s/p percutaneous drain placement.    3/31  Plan for repeat CT scan 4/2/2025 or sooner if drain output is consistently less than 10 mL per 24 hours per IR recs 3/29.  If drain output remains similar today, consider CT scan tomorrow.  S/p amoxicillin.  Monitor off antibiotics.  Continue multimodal pain regimen.

## 2025-03-22 NOTE — CARE PLAN
The patient is Stable - Low risk of patient condition declining or worsening    Shift Goals  Clinical Goals: Pain management  Patient Goals: Pain control, surgery    Progress made toward(s) clinical / shift goals:  Patient able to manage pain with medications in the MAR and non-pharm interventions. Patient able to verbalize understanding of safety measures and need to call for assistance.     Patient is not progressing towards the following goals:

## 2025-03-22 NOTE — PROGRESS NOTES
[facility] DIRECT ADMISSION REPORT  Transferring facility: [facility] ER  Transferring physician: PA    Chief complaint: Abdominal pain  Pertinent history & patient course: 75-year-old male with history of HFrEF 40%, DVT on Eliquis, recent laparoscopic cholecystectomy by Dr. Yadav 3/13/2025 presented to [facility] emergency room with complaint abdominal pain, shortness of breath.    Patient noted to have a 10 cm fluid collection around gallbladder fossa.  Case was discussed with on-call surgery at [facility] (Dr. Turcios) -- recommended IR drain and HIDA scan for evaluation of bile leak.  Apparently no IR services available all weekend at [facility], therefore transfer been requested.    Patient has been given morphine, Zosyn.  No IVF given due to concern of CHF    Pertinent imaging & lab results:   CTA chest: No definitive PE  CT abdomen pelvis: 10 cm fluid collection around gallbladder fossa    WBC 17.9 K, AST 34, ALT 13, alkaline phosphatase 136, lipase 14, total bilirubin 0.6    Consultants called prior to transfer and pertinent input from consultants:   Code Status:  per transferring provider, I personally verified with the transferring provider patient's code status and the transferring provider has confirmed this with the patient.  Reason for Transfer: IR  Further work up or recommendations requested prior to transfer: IR, HIDA    Patient accepted for transfer: Yes  Accepting [facility] Facility: [facility] - Nursing to notify the Triage Coordinator in the RTOC via Voalte or Phone ext. [phone] when patient arrives to the unit. The Triage Coordinator will assign the admitting provider.    Consultants to be called upon arrival: IR and surgery in AM  Admission status: Inpatient.   Floor requested: medsurg  If ICU transfer, name of intensivist case discussed with and pertinent input from critical care:     The admitting provider is the point of contact for questions or  concerns regarding patient's care.

## 2025-03-22 NOTE — PROGRESS NOTES
Hospital Medicine Daily Progress Note    Date of Service  3/22/2025    Chief Complaint  Eduardo Cuellar is a 75 y.o. male admitted 3/21/2025 with surgical site fluid collection    Hospital Course  Eduardo Cuellar is a 75 y.o. male who presented 3/21/2025 with abdominal pain.  Patient has a history of morbid obesity, heart failure with reduced ejection fraction, pulmonary embolism on Eliquis, recent cholecystectomy on March 13.  He was noted to have recent cholecystitis status post cholecystectomy and worsening abdominal distention,  pain, chills, and SOB     At outside facility ER, he was found WBC 18K, CT angiogram chest abdomen pelvis negative for pulmonary embolism, shows a 10 cm fluid collection within the gallbladder fossa , Small right pleural effusion with subsegmental passive atelectasis of the right lung base.     General surgery was consulted, recommends evaluation for bile leak, IR consult for drainage of fluid.      Luxembourgish spoken,  device was used.     Interval Problem Update  I have seen and examined the patient at bedside  Luxembourgish-speaking only, I used to the professional  device for communication    He underwent IR drain this morning.  He reported abdominal pain, no nausea or vomiting.     Follow-up fluid culture  Pending HIDA  Continue IV Zosyn    Wbc 19>19    On 6 L NC    I have discussed this patient's plan of care and discharge plan at IDT rounds today with Case Management, Nursing, Nursing leadership, and other members of the IDT team.    Consultants/Specialty  General surgery    Code Status  Full Code    Disposition  The patient is not medically cleared for discharge to home or a post-acute facility.      I have placed the appropriate orders for post-discharge needs.    Review of Systems   All 12 systems were reviewed and negative except as mentioned above      Physical Exam  Temp:  [36.7 °C (98.1 °F)-37 °C (98.6 °F)] 36.7 °C (98.1 °F)  Pulse:   [81-99] 93  Resp:  [14-47] 17  BP: ()/(60-81) 120/80  SpO2:  [92 %-96 %] 94 %    Physical Exam  Constitutional:       General: He is in acute distress.      Appearance: He is ill-appearing.   HENT:      Head: Normocephalic.      Nose: Nose normal.      Mouth/Throat:      Mouth: Mucous membranes are moist.   Eyes:      Conjunctiva/sclera: Conjunctivae normal.   Cardiovascular:      Rate and Rhythm: Normal rate and regular rhythm.      Pulses: Normal pulses.      Heart sounds: Normal heart sounds.   Abdominal:      General: Bowel sounds are normal.      Palpations: Abdomen is soft.      Tenderness: There is abdominal tenderness. There is no guarding.      Comments: KELLY drain in place   Musculoskeletal:         General: No swelling or tenderness.      Cervical back: Normal range of motion and neck supple.   Skin:     General: Skin is warm.   Neurological:      Mental Status: He is alert and oriented to person, place, and time. Mental status is at baseline.   Psychiatric:         Mood and Affect: Mood normal.         Fluids    Intake/Output Summary (Last 24 hours) at 3/22/2025 1214  Last data filed at 3/22/2025 0702  Gross per 24 hour   Intake 0 ml   Output 675 ml   Net -675 ml        Laboratory  Recent Labs     03/21/25 2218 03/22/25  1022   WBC 19.2* 19.4*   RBC 4.96 4.74   HEMOGLOBIN 13.7* 13.2*   HEMATOCRIT 41.5* 40.3*   MCV 83.7 85.0   MCH 27.6 27.8   MCHC 33.0 32.8   RDW 45.0 46.4   PLATELETCT 480* 435   MPV 9.7 9.4     Recent Labs     03/21/25 2218 03/22/25  1022   SODIUM 137 138   POTASSIUM 3.3* 4.2   CHLORIDE 98 100   CO2 27 27   GLUCOSE 103* 97   BUN 14 14   CREATININE 0.73 0.74   CALCIUM 8.4* 8.2*     Recent Labs     03/21/25 2218   INR 1.09               Imaging  CT-IMAGE-GUIDED DRAIN PERITONEAL   Final Result      1.  CT GUIDED PLACEMENT OF A PERCUTANEOUS DRAINAGE CATHETER IN A GALLBLADDER FOSSA FLUID COLLECTION.   2.  THE CURRENT PLAN IS TO MONITOR DRAINAGE OUTPUT AND OBTAIN A FOLLOWUP CT SCAN IN  5-7 DAYS IF CLINICALLY INDICATED.      NM-HEPATOBILIARY SCAN    (Results Pending)   DX-CHEST-PORTABLE (1 VIEW)    (Results Pending)        Assessment/Plan  * Fluid collection at surgical site  Assessment & Plan  Patient noted to have a 10 cm fluid collection at the gallbladder fossa  WBC 18K  Likely infected    General surgery was consulted, recommends evaluation for bile leak, IR consult for drainage of fluid.      S/P IR drain 3/22, fluid culture pending  Pending HIDA  Continue IV Zosyn    History of pulmonary embolism  Assessment & Plan  Home Eliquis on hold for possible procedure    SIRS (systemic inflammatory response syndrome) ([facility])  Assessment & Plan  SIRS criteria identified on my evaluation include:  Tachycardia, with heart rate greater than 90 BPM and Leukocytosis, with WBC greater than 12,000    DVT (deep venous thrombosis) ([facility])  Assessment & Plan  Eliquis held for now due to anticipated procedure in the morning  CTPA at outside hospital negative for pulmonary embolism    CHF (congestive heart failure) ([facility])  Assessment & Plan  History of heart failure with ejection fraction 45%  Noted to have lower extremity swelling and small pleural effusion seen on CAT scan   Bnp 986  Requires oxygen at admission    Patient was placed on both IV fluid and Lasix at admission  I ordered a chest x-ray, hold IV fluid  Continue to monitor      ACP (advance care planning)  Assessment & Plan  Was discussed by previous provider, full code         VTE prophylaxis: scd    I have performed a physical exam and reviewed and updated ROS and Plan today (3/22/2025). In review of yesterday's note (3/21/2025), there are no changes except as documented above.    I spent greater than 53 minutes for chart review, obtaining history independently, performing medically appropriate examination,  documenting , ordering medications, tests, or procedures, referring and communicating with other health care professionals, Independently  interpreting results and communicating results with patient/family/caregiver. More than 50% of time was spent in face-to-face clinical encounter.

## 2025-03-22 NOTE — OR SURGEON
Immediate Post- Operative Note        Findings: GB fossa fluid collection.      Procedure(s): CT guided drain placement.       Estimated Blood Loss: Less than 5 ml        Complications: None            3/22/2025     0937 AM     Zeb Mooney M.D.

## 2025-03-22 NOTE — PROGRESS NOTES
4 Eyes Skin Assessment Completed by Cheryle, RN and Holden CORBETT.    Head WDL  Ears WDL  Nose WDL  Mouth WDL  Neck WDL  Breast/Chest WDL  Shoulder Blades WDL  Spine WDL  (R) Arm/Elbow/Hand WDL  (L) Arm/Elbow/Hand WDL  Abdomen Incision previous incisions x3 and a benji and tag dressing  Groin WDL  Scrotum/Coccyx/Buttocks WDL  (R) Leg WDL  (L) Leg WDL  (R) Heel/Foot/Toe Redness and Blanching  (L) Heel/Foot/Toe Redness and Blanching          Devices In Places Blood Pressure Cuff, Pulse Ox, and Nasal Cannula      Interventions In Place NC W/Ear Foams and Pillows    Possible Skin Injury No    Pictures Uploaded Into Epic N/A  Wound Consult Placed N/A  RN Wound Prevention Protocol Ordered No

## 2025-03-22 NOTE — ASSESSMENT & PLAN NOTE
SIRS criteria identified on my evaluation include:  Tachycardia, with heart rate greater than 90 BPM and Leukocytosis, with WBC greater than 12,000  Improved

## 2025-03-22 NOTE — ASSESSMENT & PLAN NOTE
History of heart failure with ejection fraction 45%  Noted to have lower extremity swelling and small pleural effusion seen on CAT scan   Bnp 986 on admission.  S/p IV Lasix  Continue home p.o. Lasix.  Back to baseline home 3L NC.

## 2025-03-22 NOTE — PROGRESS NOTES
Pt presents to CT 4. Patient was consented by MD at bedside using  services Megan [id number], confirmed by this RN and consent at bedside. Pt transferred to CT 4 table in Supine position. Patient underwent a Gallbladder Fossa Drain Placement by Dr. Mooney. Procedure site was marked by MD and verified using imaging guidance. Pt placed on monitor, prepped and draped in a sterile fashion. Vitals were taken every 5 minutes and remained stable during procedure (see doc flow sheet for results). CO2 waveform capnography was monitored and remained WNL throughout procedure. Report called to Venus OTOOLE. Pt transported by Hospital with RN to T405.     Specimen: 60ml hand delivered to lab.    [employer] APDL 12F x 25cm  REF: [id number]  LOT: [id number]  EXP: 2027-11-29

## 2025-03-22 NOTE — H&P
Hospital Medicine History & Physical Note    Date of Service  3/21/2025    Primary Care Physician  Mohsen Tamasaby, M.D.    Consultants  General surgery    Code Status  Full Code    Chief Complaint  Fluid collection    History of Presenting Illness  Eduardo Cuellar is a 75 y.o. male who presented 3/21/2025 with abdominal pain.  Patient has a history of morbid obesity, heart failure with reduced ejection fraction, pulmonary embolism on Eliquis, recent cholecystectomy on March 13.  He was noted to have recent cholecystitis status post cholecystectomy.  Since then, he has noted to have abdominal distention, generalized abdominal pain, chills.  He states that it is difficult to breathe due to the abdominal pain.  He has been having difficulty sleeping due to the symptoms.    At outside facility ER,  Patient found a white blood cell count 18,000.  CT angiogram chest abdomen pelvis negative for pulmonary embolism, shows a 10 cm fluid collection within the gallbladder fossa that is favored to represent a postoperative seroma in the absence of clinical signs of infection. Serpinginous/spongiform morphology of gas within the gallbladder fossa collection raise the possibility of retained hemostatic material.  Small right pleural effusion with subsegmental passive atelectasis of the right lung base.    General surgery was consulted, recommends evaluation for bile leak, IR consult for drainage of fluid.  Patient given Zosyn and patient currently to [facility] for IR consult.    I discussed the plan of care with patient.    Review of Systems  ROS    Past Medical History   has no past medical history on file.    Surgical History   has no past surgical history on file.     Family History  family history is not on file.   Family history reviewed with patient. There is no family history that is pertinent to the chief complaint.     Social History   reports that he has never smoked. He has never used smokeless tobacco. He reports  "that he does not currently use alcohol. He reports that he does not use drugs.    Allergies  No Known Allergies    Medications  Prior to Admission Medications   Prescriptions Last Dose Informant Patient Reported? Taking?   lisinopril (PRINIVIL) 40 MG tablet   No No   Sig: Take 1 Tablet by mouth every day.      Facility-Administered Medications: None       Physical Exam  Temp:  [36.7 °C (98.1 °F)] 36.7 °C (98.1 °F)  Pulse:  [99] 99  Resp:  [16] 16  BP: (108)/(81) 108/81  SpO2:  [92 %] 92 %  Blood Pressure : 108/81   Temperature: 36.7 °C (98.1 °F)   Pulse: 99   Respiration: 16   Pulse Oximetry: 92 %       Physical Exam  Constitutional:       Appearance: Normal appearance. He is obese.   HENT:      Head: Normocephalic.      Mouth/Throat:      Mouth: Mucous membranes are moist.   Cardiovascular:      Rate and Rhythm: Regular rhythm. Tachycardia present.      Pulses: Normal pulses.   Pulmonary:      Comments: Bibasilar crackles heard  Abdominal:      General: Abdomen is flat. Bowel sounds are normal.      Palpations: Abdomen is soft.      Comments: Endorses pain upon palpation of epigastric area   Musculoskeletal:         General: Normal range of motion.      Cervical back: Neck supple.   Skin:     General: Skin is warm.   Neurological:      General: No focal deficit present.      Mental Status: He is alert and oriented to person, place, and time. Mental status is at baseline.   Psychiatric:         Mood and Affect: Mood normal.         Behavior: Behavior normal.         Thought Content: Thought content normal.         Judgment: Judgment normal.         Laboratory:          No results for input(s): \"ALTSGPT\", \"ASTSGOT\", \"ALKPHOSPHAT\", \"TBILIRUBIN\", \"DBILIRUBIN\", \"GAMMAGT\", \"AMYLASE\", \"LIPASE\", \"ALB\", \"PREALBUMIN\", \"GLUCOSE\" in the last 72 hours.      No results for input(s): \"NTPROBNP\" in the last 72 hours.      No results for input(s): \"TROPONINT\" in the last 72 hours.    Imaging:  No orders to display       no X-Ray or " EKG requiring interpretation    Assessment/Plan:  Justification for Admission Status  I anticipate this patient will require at least two midnights for appropriate medical management, necessitating inpatient admission because pt has a 10 cm fluid collection in the gallbladder fossa    Patient will need a Med/Surg bed on SURGICAL service .  The need is secondary to 10 cm fluid collection in the gallbladder fossa.    * Fluid collection at surgical site  Assessment & Plan  Patient noted to have a 10 cm fluid collection at the gallbladder fossa  Given Zosyn at outside hospital, continue Zosyn  Pain medications  HIDA scan to evaluate for bile leak  IR consult and treat order placed    ACP (advance care planning)  Assessment & Plan  16 minutes by discussing goals of care with patient.  I went over the process of CPR but he would want should patient have clinically deteriorate and have a cardiac arrest.  Patient states that he would like to be full code and to have everything done.    SIRS (systemic inflammatory response syndrome) ([facility])  Assessment & Plan  SIRS criteria identified on my evaluation include:  Tachycardia, with heart rate greater than 90 BPM and Leukocytosis, with WBC greater than 12,000    DVT (deep venous thrombosis) ([facility])  Assessment & Plan  Eliquis held for now due to anticipated procedure in the morning  CTPA at outside hospital negative for pulmonary embolism    CHF (congestive heart failure) ([facility])  Assessment & Plan  History of heart failure with ejection fraction 45%  Noted to have lower extremity swelling and small pleural effusion seen on CAT scan   Start Lasix IV daily  Resume home medications as needed        VTE prophylaxis: pharmacologic prophylaxis contraindicated due to IR drainage in am

## 2025-03-23 PROBLEM — J96.21 ACUTE ON CHRONIC RESPIRATORY FAILURE WITH HYPOXIA (HCC): Status: ACTIVE | Noted: 2025-03-23

## 2025-03-23 LAB
ANION GAP SERPL CALC-SCNC: 9 MMOL/L (ref 7–16)
BUN SERPL-MCNC: 14 MG/DL (ref 8–22)
CALCIUM SERPL-MCNC: 8 MG/DL (ref 8.5–10.5)
CHLORIDE SERPL-SCNC: 99 MMOL/L (ref 96–112)
CO2 SERPL-SCNC: 29 MMOL/L (ref 20–33)
CREAT SERPL-MCNC: 0.75 MG/DL (ref 0.5–1.4)
ERYTHROCYTE [DISTWIDTH] IN BLOOD BY AUTOMATED COUNT: 46.3 FL (ref 35.9–50)
GFR SERPLBLD CREATININE-BSD FMLA CKD-EPI: 95 ML/MIN/1.73 M 2
GLUCOSE SERPL-MCNC: 111 MG/DL (ref 65–99)
HCT VFR BLD AUTO: 37.5 % (ref 42–52)
HGB BLD-MCNC: 12.4 G/DL (ref 14–18)
MCH RBC QN AUTO: 28.2 PG (ref 27–33)
MCHC RBC AUTO-ENTMCNC: 33.1 G/DL (ref 32.3–36.5)
MCV RBC AUTO: 85.2 FL (ref 81.4–97.8)
PLATELET # BLD AUTO: 459 K/UL (ref 164–446)
PMV BLD AUTO: 10 FL (ref 9–12.9)
POTASSIUM SERPL-SCNC: 4.1 MMOL/L (ref 3.6–5.5)
RBC # BLD AUTO: 4.4 M/UL (ref 4.7–6.1)
SODIUM SERPL-SCNC: 137 MMOL/L (ref 135–145)
WBC # BLD AUTO: 16.1 K/UL (ref 4.8–10.8)

## 2025-03-23 PROCEDURE — 700111 HCHG RX REV CODE 636 W/ 250 OVERRIDE (IP): Mod: JZ | Performed by: STUDENT IN AN ORGANIZED HEALTH CARE EDUCATION/TRAINING PROGRAM

## 2025-03-23 PROCEDURE — 85027 COMPLETE CBC AUTOMATED: CPT

## 2025-03-23 PROCEDURE — A9270 NON-COVERED ITEM OR SERVICE: HCPCS | Performed by: STUDENT IN AN ORGANIZED HEALTH CARE EDUCATION/TRAINING PROGRAM

## 2025-03-23 PROCEDURE — 700105 HCHG RX REV CODE 258: Performed by: STUDENT IN AN ORGANIZED HEALTH CARE EDUCATION/TRAINING PROGRAM

## 2025-03-23 PROCEDURE — 700105 HCHG RX REV CODE 258: Performed by: SURGERY

## 2025-03-23 PROCEDURE — 770001 HCHG ROOM/CARE - MED/SURG/GYN PRIV*

## 2025-03-23 PROCEDURE — 700102 HCHG RX REV CODE 250 W/ 637 OVERRIDE(OP): Performed by: STUDENT IN AN ORGANIZED HEALTH CARE EDUCATION/TRAINING PROGRAM

## 2025-03-23 PROCEDURE — 700111 HCHG RX REV CODE 636 W/ 250 OVERRIDE (IP): Mod: JZ | Performed by: SURGERY

## 2025-03-23 PROCEDURE — 80048 BASIC METABOLIC PNL TOTAL CA: CPT

## 2025-03-23 PROCEDURE — 36415 COLL VENOUS BLD VENIPUNCTURE: CPT

## 2025-03-23 PROCEDURE — 99233 SBSQ HOSP IP/OBS HIGH 50: CPT | Performed by: STUDENT IN AN ORGANIZED HEALTH CARE EDUCATION/TRAINING PROGRAM

## 2025-03-23 RX ORDER — ENOXAPARIN SODIUM 100 MG/ML
40 INJECTION SUBCUTANEOUS EVERY 12 HOURS
Status: DISCONTINUED | OUTPATIENT
Start: 2025-03-24 | End: 2025-03-24

## 2025-03-23 RX ADMIN — LOSARTAN POTASSIUM 50 MG: 50 TABLET, FILM COATED ORAL at 05:09

## 2025-03-23 RX ADMIN — FUROSEMIDE 40 MG: 10 INJECTION INTRAMUSCULAR; INTRAVENOUS at 05:09

## 2025-03-23 RX ADMIN — PIPERACILLIN AND TAZOBACTAM 3.38 G: 3; .375 INJECTION, POWDER, FOR SOLUTION INTRAVENOUS at 14:04

## 2025-03-23 RX ADMIN — OXYCODONE 5 MG: 5 TABLET ORAL at 03:06

## 2025-03-23 RX ADMIN — OXYCODONE 5 MG: 5 TABLET ORAL at 11:58

## 2025-03-23 RX ADMIN — CARVEDILOL 6.25 MG: 6.25 TABLET, FILM COATED ORAL at 17:36

## 2025-03-23 RX ADMIN — PIPERACILLIN AND TAZOBACTAM 3.38 G: 3; .375 INJECTION, POWDER, FOR SOLUTION INTRAVENOUS at 22:17

## 2025-03-23 RX ADMIN — OXYCODONE 5 MG: 5 TABLET ORAL at 17:36

## 2025-03-23 RX ADMIN — PIPERACILLIN AND TAZOBACTAM 3.38 G: 3; .375 INJECTION, POWDER, FOR SOLUTION INTRAVENOUS at 05:11

## 2025-03-23 ASSESSMENT — PAIN DESCRIPTION - PAIN TYPE
TYPE: ACUTE PAIN;SURGICAL PAIN
TYPE: ACUTE PAIN
TYPE: ACUTE PAIN
TYPE: ACUTE PAIN;SURGICAL PAIN
TYPE: ACUTE PAIN

## 2025-03-23 ASSESSMENT — ENCOUNTER SYMPTOMS
PALPITATIONS: 0
VOMITING: 0
SHORTNESS OF BREATH: 0
ABDOMINAL PAIN: 1
WEAKNESS: 0
CHILLS: 0
FEVER: 0
HEADACHES: 0
NAUSEA: 0

## 2025-03-23 NOTE — PROGRESS NOTES
Radiology Progress Note   Author: SHAWNEE Roman Date & Time created: 3/23/2025  7:58 AM   Date of admission  3/21/2025  Note to reader: this note follows the APSO format rather than the historical SOAP format. Assessment and plan located at the top of the note for ease of use.    Chief Complaint  75 y.o. male admitted 3/21/2025 with abdominal pain      HPI  75-year-old male with past medical history significant for HFrEF, PE on Eliquis, morbid obesity, and recent cholecystitis s/p cholecystectomy (03/13/25) transferred from outside facility for CT finding of gallbladder fossa fluid collection after presenting with continued abdominal pain.  IR was consulted and patient underwent CT-guided gallbladder fossa fluid collection drain with IR Dr. Mooney on 03/22/2025. 80 mL old, cloudy, bloody fluid was drained at time of drain placement    Interval History:   03/23/2025 - GB fossa drain to RUQ with no output in the last 24 hours.  I flushed the drain with 10 mLs NS with moderate amount of maroon fluid returned in the suction bulb. Labs reviewed by me including WBC 16.1, H&H 12.4/37.5, creatinine 0.75.  GB fossa fluid cultures pending.  On ABX.  HIDA scan without evidence of biliary leak. Pt had many questions regarding plan of care and all questions answered to patient's satisfaction using  line.  I reviewed all IDT notes, reviewed all imaging, and discussed patient with bedside RN.     Assessment/Plan     Principal Problem:    Fluid collection at surgical site  Active Problems:    Abdominal pain    CHF (congestive heart failure) ([facility])    DVT (deep venous thrombosis) ([facility])    SIRS (systemic inflammatory response syndrome) ([facility])    ACP (advance care planning)    History of pulmonary embolism    SBO (small bowel obstruction) ([facility])      Plan IR  - Order placed to irrigate GB fossa drain with 10 ml of sterile saline each shift  - Fluid cultures pending   - surgery following   - Recommend follow up  CT scan in 5-7 days (~03/27/25)  - IR will continue to follow    -  -Thank you for allowing Interventional Radiology team to participate in the patients care, if any additional care or requests are needed in the future please do not hesitate to call or place IR order           Review of Systems  Physical Exam   Review of Systems   Constitutional:  Positive for malaise/fatigue. Negative for chills and fever.   Respiratory:  Negative for shortness of breath.    Cardiovascular:  Negative for chest pain and palpitations.   Gastrointestinal:  Positive for abdominal pain. Negative for nausea and vomiting.   Neurological:  Negative for weakness and headaches.      Vitals:    03/23/25 0714   BP: 113/74   Pulse: 85   Resp: 18   Temp: 36.6 °C (97.9 °F)   SpO2: 90%        Physical Exam  Constitutional:       General: He is not in acute distress.  Cardiovascular:      Rate and Rhythm: Normal rate.      Pulses: Normal pulses.   Pulmonary:      Effort: Pulmonary effort is normal. No respiratory distress.   Abdominal:      Tenderness: There is abdominal tenderness.      Comments: IR drain to RUQ   Skin:     General: Skin is warm and dry.   Neurological:      General: No focal deficit present.      Mental Status: He is alert and oriented to person, place, and time.   Psychiatric:         Mood and Affect: Mood normal.         Behavior: Behavior normal.             Labs    Recent Labs     03/21/25 2218 03/22/25  1022 03/23/25  0042   WBC 19.2* 19.4* 16.1*   RBC 4.96 4.74 4.40*   HEMOGLOBIN 13.7* 13.2* 12.4*   HEMATOCRIT 41.5* 40.3* 37.5*   MCV 83.7 85.0 85.2   MCH 27.6 27.8 28.2   MCHC 33.0 32.8 33.1   RDW 45.0 46.4 46.3   PLATELETCT 480* 435 459*   MPV 9.7 9.4 10.0     Recent Labs     03/21/25 2218 03/22/25  1022 03/23/25  0042   SODIUM 137 138 137   POTASSIUM 3.3* 4.2 4.1   CHLORIDE 98 100 99   CO2 27 27 29   GLUCOSE 103* 97 111*   BUN 14 14 14   CREATININE 0.73 0.74 0.75   CALCIUM 8.4* 8.2* 8.0*     Recent Labs      "03/21/25  2218 03/22/25  1022 03/23/25  0042   ALBUMIN 3.0* 2.6*  --    TBILIRUBIN 0.9 1.1  --    ALKPHOSPHAT 165* 119*  --    TOTPROTEIN 6.9 6.4  --    ALTSGPT 9 10  --    ASTSGOT 36 36  --    CREATININE 0.73 0.74 0.75     DX-CHEST-PORTABLE (1 VIEW)   Final Result      Findings suggestive of pulmonary edema with elevation of the right hemidiaphragm and bibasilar atelectasis/infiltrate.      NM-HEPATOBILIARY SCAN   Final Result      No scintigraphy evidence of bile leak during the 60 minute imaging.      CT-IMAGE-GUIDED DRAIN PERITONEAL   Final Result      1.  CT GUIDED PLACEMENT OF A PERCUTANEOUS DRAINAGE CATHETER IN A GALLBLADDER FOSSA FLUID COLLECTION.   2.  THE CURRENT PLAN IS TO MONITOR DRAINAGE OUTPUT AND OBTAIN A FOLLOWUP CT SCAN IN 5-7 DAYS IF CLINICALLY INDICATED.        INR   Date Value Ref Range Status   03/21/2025 1.09 0.87 - 1.13 Final     Comment:     INR - Non-therapeutic Reference Range: 0.87-1.13  INR - Therapeutic Reference Range: 2.0-4.0       No results found for: \"POCINR\"     Intake/Output Summary (Last 24 hours) at 3/23/2025 0758  Last data filed at 3/23/2025 0714  Gross per 24 hour   Intake 0 ml   Output 0 ml   Net 0 ml      I have personally reviewed the above labs and imaging      I have performed a physical exam and reviewed and updated ROS and Plan today (3/23/2025).     54 minutes in directly providing and coordinating care and extensive data review.  No time overlap and excludes procedures.   "

## 2025-03-23 NOTE — PROGRESS NOTES
Derma referral done    Please offer Same Day       Surgical Daily Progress Note    Date of Service  3/23/2025    Chief Complaint  75 y.o. male admitted 3/21/2025 with Medical (post-op complications)    Interval Events  Doing better. IR drain scant. Tolerating diet.     Review of Systems  Review of Systems   Gastrointestinal:  Positive for abdominal pain.        Vital Signs for last 24 hours  Temp:  [36.1 °C (97 °F)-37.3 °C (99.1 °F)] 36.6 °C (97.9 °F)  Pulse:  [85-96] 85  Resp:  [17-18] 18  BP: ()/(66-87) 113/74  SpO2:  [90 %-96 %] 90 %    Hemodynamic parameters for last 24 hours       Respiratory Data     Respiration: 18, Pulse Oximetry: 90 %        RUL Breath Sounds: Clear, RML Breath Sounds: Diminished, RLL Breath Sounds: Diminished, SAMSON Breath Sounds: Clear, LLL Breath Sounds: Diminished    Physical Exam  Physical Exam  Pulmonary:      Effort: Pulmonary effort is normal.   Abdominal:      General: There is no distension.      Palpations: Abdomen is soft.      Tenderness: There is abdominal tenderness.      Comments: KELLY serosang   Skin:     General: Skin is warm.   Neurological:      General: No focal deficit present.      Mental Status: He is alert.         Laboratory  Recent Results (from the past 24 hours)   LACTIC ACID    Collection Time: 03/22/25 10:22 AM   Result Value Ref Range    Lactic Acid 1.0 0.5 - 2.0 mmol/L   Comp Metabolic Panel    Collection Time: 03/22/25 10:22 AM   Result Value Ref Range    Sodium 138 135 - 145 mmol/L    Potassium 4.2 3.6 - 5.5 mmol/L    Chloride 100 96 - 112 mmol/L    Co2 27 20 - 33 mmol/L    Anion Gap 11.0 7.0 - 16.0    Glucose 97 65 - 99 mg/dL    Bun 14 8 - 22 mg/dL    Creatinine 0.74 0.50 - 1.40 mg/dL    Calcium 8.2 (L) 8.5 - 10.5 mg/dL    Correct Calcium 9.3 8.5 - 10.5 mg/dL    AST(SGOT) 36 12 - 45 U/L    ALT(SGPT) 10 2 - 50 U/L    Alkaline Phosphatase 119 (H) 30 - 99 U/L    Total Bilirubin 1.1 0.1 - 1.5 mg/dL    Albumin 2.6 (L) 3.2 - 4.9 g/dL    Total Protein 6.4 6.0 - 8.2 g/dL    Globulin 3.8 (H) 1.9 - 3.5 g/dL     A-G Ratio 0.7 g/dL   CBC WITHOUT DIFFERENTIAL    Collection Time: 03/22/25 10:22 AM   Result Value Ref Range    WBC 19.4 (H) 4.8 - 10.8 K/uL    RBC 4.74 4.70 - 6.10 M/uL    Hemoglobin 13.2 (L) 14.0 - 18.0 g/dL    Hematocrit 40.3 (L) 42.0 - 52.0 %    MCV 85.0 81.4 - 97.8 fL    MCH 27.8 27.0 - 33.0 pg    MCHC 32.8 32.3 - 36.5 g/dL    RDW 46.4 35.9 - 50.0 fL    Platelet Count 435 164 - 446 K/uL    MPV 9.4 9.0 - 12.9 fL   MAGNESIUM    Collection Time: 03/22/25 10:22 AM   Result Value Ref Range    Magnesium 2.1 1.5 - 2.5 mg/dL   PHOSPHORUS    Collection Time: 03/22/25 10:22 AM   Result Value Ref Range    Phosphorus 3.4 2.5 - 4.5 mg/dL   ESTIMATED GFR    Collection Time: 03/22/25 10:22 AM   Result Value Ref Range    GFR (CKD-EPI) 95 >60 mL/min/1.73 m 2   Basic Metabolic Panel    Collection Time: 03/23/25 12:42 AM   Result Value Ref Range    Sodium 137 135 - 145 mmol/L    Potassium 4.1 3.6 - 5.5 mmol/L    Chloride 99 96 - 112 mmol/L    Co2 29 20 - 33 mmol/L    Glucose 111 (H) 65 - 99 mg/dL    Bun 14 8 - 22 mg/dL    Creatinine 0.75 0.50 - 1.40 mg/dL    Calcium 8.0 (L) 8.5 - 10.5 mg/dL    Anion Gap 9.0 7.0 - 16.0   CBC WITHOUT DIFFERENTIAL    Collection Time: 03/23/25 12:42 AM   Result Value Ref Range    WBC 16.1 (H) 4.8 - 10.8 K/uL    RBC 4.40 (L) 4.70 - 6.10 M/uL    Hemoglobin 12.4 (L) 14.0 - 18.0 g/dL    Hematocrit 37.5 (L) 42.0 - 52.0 %    MCV 85.2 81.4 - 97.8 fL    MCH 28.2 27.0 - 33.0 pg    MCHC 33.1 32.3 - 36.5 g/dL    RDW 46.3 35.9 - 50.0 fL    Platelet Count 459 (H) 164 - 446 K/uL    MPV 10.0 9.0 - 12.9 fL   ESTIMATED GFR    Collection Time: 03/23/25 12:42 AM   Result Value Ref Range    GFR (CKD-EPI) 95 >60 mL/min/1.73 m 2       Fluids    Intake/Output Summary (Last 24 hours) at 3/23/2025 0943  Last data filed at 3/23/2025 0714  Gross per 24 hour   Intake 0 ml   Output 0 ml   Net 0 ml       Core Measures & Quality Metrics  Core Measures & Quality Metrics  SHIRAZ Score  ETOH Screening    Assessment/Plan  * Fluid  collection at surgical site- (present on admission)  Assessment & Plan  Post op fluid collection in GB fossa  3/22 IR drain placed  Cx pending  IV abx  HIDA neg        Discussed patient condition with Patient.  CRITICAL CARE TIME EXCLUDING PROCEDURES: 20    minutes

## 2025-03-23 NOTE — CARE PLAN
The patient is Stable - Low risk of patient condition declining or worsening    Shift Goals  Clinical Goals: pain management, continue to wean O2, drain management  Patient Goals: sleep    Progress made toward(s) clinical / shift goals:  Patient able to manage pain with medications on the MAR and non-pharm interventions. Patient able to voice understanding of need to call prior to ambulation due to safety. Patient also able to verbalize need and understanding of drain and IV fluid    Patient is not progressing towards the following goals:

## 2025-03-23 NOTE — PROGRESS NOTES
"Assumed care of patient at 1845. Bedside report received. Assessment complete.  AA&Ox4. Denies CP/SOB.  Reporting 8/10 pain. Medicated per MAR.   Educated patient regarding pharmacologic and non pharmacologic modalities for pain management.  Skin per flowsheet.  Tolerating cardiac diet. Denies N/V.  + void. - BM. Last BM 3/21 PTA  Pt ambulates SBA     All needs met at this time. Call light within reach. Pt calls appropriately. Bed low and locked, non skid socks in place. Hourly rounding in place.    BP (!) 140/80   Pulse 87   Temp 37.1 °C (98.8 °F) (Temporal)   Resp 18   Ht 1.676 m (5' 6\")   Wt 115 kg (254 lb 3.1 oz)   SpO2 93%   BMI 41.03 kg/m²     "

## 2025-03-23 NOTE — PROGRESS NOTES
Hospital Medicine Daily Progress Note    Date of Service  3/23/2025    Chief Complaint  Eduardo Cuellar is a 75 y.o. male admitted 3/21/2025 with surgical site fluid collection    Hospital Course  Eduardo Cuellar is a 75 y.o. male who presented 3/21/2025 with abdominal pain.  Patient has a history of morbid obesity, heart failure with reduced ejection fraction, pulmonary embolism on Eliquis, recent cholecystectomy on March 13.  He was noted to have recent cholecystitis status post cholecystectomy and worsening abdominal distention,  pain, chills, and SOB     At outside facility ER, he was found WBC 18K, CT angiogram chest abdomen pelvis negative for pulmonary embolism, shows a 10 cm fluid collection within the gallbladder fossa , Small right pleural effusion with subsegmental passive atelectasis of the right lung base.     General surgery was consulted, recommends evaluation for bile leak, IR consult for drainage of fluid.      Hungarian spoken,  device was used.     Interval Problem Update  I have seen and examined the patient at bedside  Reported the abdominal pain is better    S/p  IR drain 3/22, fluid culture pending  S/p HIDA 3/22, negative for bile leak  Continue Zosyn  I discussed with the general surgery,   Wbc 19>19>16, procalcitonin 0.19    I have discussed this patient's plan of care and discharge plan at IDT rounds today with Case Management, Nursing, Nursing leadership, and other members of the IDT team.    Consultants/Specialty  General surgery    Code Status  Full Code    Disposition  The patient is not medically cleared for discharge to home or a post-acute facility.      I have placed the appropriate orders for post-discharge needs.    Review of Systems   All 12 systems were reviewed and negative except as mentioned above      Physical Exam  Temp:  [36.6 °C (97.9 °F)-37.3 °C (99.1 °F)] 36.9 °C (98.4 °F)  Pulse:  [85-96] 90  Resp:  [18] 18  BP: (108-140)/(74-87)  122/83  SpO2:  [90 %-93 %] 90 %    Physical Exam  Constitutional:       General: He is in acute distress.      Appearance: He is ill-appearing.   HENT:      Head: Normocephalic.      Nose: Nose normal.      Mouth/Throat:      Mouth: Mucous membranes are moist.   Eyes:      Conjunctiva/sclera: Conjunctivae normal.   Cardiovascular:      Rate and Rhythm: Normal rate and regular rhythm.      Pulses: Normal pulses.      Heart sounds: Normal heart sounds.   Abdominal:      General: Bowel sounds are normal.      Palpations: Abdomen is soft.      Tenderness: There is abdominal tenderness. There is no guarding.      Comments: KELLY drain in place   Musculoskeletal:         General: No swelling or tenderness.      Cervical back: Normal range of motion and neck supple.   Skin:     General: Skin is warm.   Neurological:      Mental Status: He is alert and oriented to person, place, and time. Mental status is at baseline.   Psychiatric:         Mood and Affect: Mood normal.         Fluids    Intake/Output Summary (Last 24 hours) at 3/23/2025 1646  Last data filed at 3/23/2025 0714  Gross per 24 hour   Intake --   Output 0 ml   Net 0 ml        Laboratory  Recent Labs     03/21/25 2218 03/22/25  1022 03/23/25  0042   WBC 19.2* 19.4* 16.1*   RBC 4.96 4.74 4.40*   HEMOGLOBIN 13.7* 13.2* 12.4*   HEMATOCRIT 41.5* 40.3* 37.5*   MCV 83.7 85.0 85.2   MCH 27.6 27.8 28.2   MCHC 33.0 32.8 33.1   RDW 45.0 46.4 46.3   PLATELETCT 480* 435 459*   MPV 9.7 9.4 10.0     Recent Labs     03/21/25 2218 03/22/25  1022 03/23/25  0042   SODIUM 137 138 137   POTASSIUM 3.3* 4.2 4.1   CHLORIDE 98 100 99   CO2 27 27 29   GLUCOSE 103* 97 111*   BUN 14 14 14   CREATININE 0.73 0.74 0.75   CALCIUM 8.4* 8.2* 8.0*     Recent Labs     03/21/25 2218   INR 1.09               Imaging  DX-CHEST-PORTABLE (1 VIEW)   Final Result      Findings suggestive of pulmonary edema with elevation of the right hemidiaphragm and bibasilar atelectasis/infiltrate.       NM-HEPATOBILIARY SCAN   Final Result      No scintigraphy evidence of bile leak during the 60 minute imaging.      CT-IMAGE-GUIDED DRAIN PERITONEAL   Final Result      1.  CT GUIDED PLACEMENT OF A PERCUTANEOUS DRAINAGE CATHETER IN A GALLBLADDER FOSSA FLUID COLLECTION.   2.  THE CURRENT PLAN IS TO MONITOR DRAINAGE OUTPUT AND OBTAIN A FOLLOWUP CT SCAN IN 5-7 DAYS IF CLINICALLY INDICATED.           Assessment/Plan  * Fluid collection at surgical site- (present on admission)  Assessment & Plan  Patient noted to have a 10 cm fluid collection at the gallbladder fossa  WBC 18K  Likely infected    General surgery was consulted, recommends evaluation for bile leak, IR consult for drainage of fluid.      S/p  IR drain 3/22, fluid culture pending  S/p HIDA 3/22, negative for bile leak  Continue Zosyn  I discussed with the general surgery,   Wbc 19>19>16, procalcitonin 0.19    Acute on chronic respiratory failure with hypoxia ([facility])  Assessment & Plan  Requires 6 L at the admission  Patient was discharged recently with home oxygen, he cannot remember how much he was on, assume 2 to 3 L  Desaturated to 85% on room air  Continue to wean down oxygen  Continue IV Lasix    History of pulmonary embolism  Assessment & Plan  Home Eliquis on hold for possible procedure    SIRS (systemic inflammatory response syndrome) ([facility])  Assessment & Plan  SIRS criteria identified on my evaluation include:  Tachycardia, with heart rate greater than 90 BPM and Leukocytosis, with WBC greater than 12,000    DVT (deep venous thrombosis) ([facility])  Assessment & Plan  Eliquis held for now due to anticipated procedure in the morning  CTPA at outside hospital negative for pulmonary embolism    CHF (congestive heart failure) ([facility])  Assessment & Plan  History of heart failure with ejection fraction 45%  Noted to have lower extremity swelling and small pleural effusion seen on CAT scan   Bnp 986  Requires oxygen at admission    Patient was placed on both IV fluid  and Lasix at admission  I ordered a chest x-ray, hold IV fluid  Continue to monitor      ACP (advance care planning)  Assessment & Plan  Was discussed by previous provider, full code         VTE prophylaxis: Lovenox    I have performed a physical exam and reviewed and updated ROS and Plan today (3/23/2025). In review of yesterday's note (3/22/2025), there are no changes except as documented above.    I spent greater than 52 minutes for chart review, obtaining history independently, performing medically appropriate examination,  documenting , ordering medications, tests, or procedures, referring and communicating with other health care professionals, Independently interpreting results and communicating results with patient/family/caregiver. More than 50% of time was spent in face-to-face clinical encounter.     ROS  VTE Selection

## 2025-03-23 NOTE — ASSESSMENT & PLAN NOTE
Patient on 3 L at baseline at home.  Initially required 6 L at the admission  In the setting of possible CHF exacerbation.  S/p IV Lasix.  Switch to home p.o. Lasix dose, 40 mg once daily.    3/31  Stable on 3L.  Continue p.o. Lasix 40 mg once daily.

## 2025-03-23 NOTE — PROGRESS NOTES
Bedside report received.  Assessment complete.  A&O x 4. Patient calls appropriately.  Patient ambulates with standby assist. Bed alarm on.   Patient has 4/10 pain. Patient medicated per MAR.  Denies N&V. Tolerating cardiac diet.  + void, + flatus, - BM.  Patient denies SOB.  SCD's refused.  Review plan with of care with patient. Call light and personal belongings within reach. Hourly rounding in place. All needs met at this time.

## 2025-03-24 LAB
ANION GAP SERPL CALC-SCNC: 10 MMOL/L (ref 7–16)
BACTERIA WND AEROBE CULT: ABNORMAL
BACTERIA WND AEROBE CULT: ABNORMAL
BUN SERPL-MCNC: 16 MG/DL (ref 8–22)
CALCIUM SERPL-MCNC: 8.1 MG/DL (ref 8.5–10.5)
CHLORIDE SERPL-SCNC: 97 MMOL/L (ref 96–112)
CO2 SERPL-SCNC: 28 MMOL/L (ref 20–33)
CREAT SERPL-MCNC: 0.8 MG/DL (ref 0.5–1.4)
ERYTHROCYTE [DISTWIDTH] IN BLOOD BY AUTOMATED COUNT: 46.8 FL (ref 35.9–50)
GFR SERPLBLD CREATININE-BSD FMLA CKD-EPI: 93 ML/MIN/1.73 M 2
GLUCOSE SERPL-MCNC: 105 MG/DL (ref 65–99)
GRAM STN SPEC: ABNORMAL
HCT VFR BLD AUTO: 39.3 % (ref 42–52)
HGB BLD-MCNC: 12.5 G/DL (ref 14–18)
MCH RBC QN AUTO: 27.4 PG (ref 27–33)
MCHC RBC AUTO-ENTMCNC: 31.8 G/DL (ref 32.3–36.5)
MCV RBC AUTO: 86.2 FL (ref 81.4–97.8)
PLATELET # BLD AUTO: 487 K/UL (ref 164–446)
PMV BLD AUTO: 9.6 FL (ref 9–12.9)
POTASSIUM SERPL-SCNC: 4.2 MMOL/L (ref 3.6–5.5)
RBC # BLD AUTO: 4.56 M/UL (ref 4.7–6.1)
SIGNIFICANT IND 70042: ABNORMAL
SITE SITE: ABNORMAL
SODIUM SERPL-SCNC: 135 MMOL/L (ref 135–145)
SOURCE SOURCE: ABNORMAL
WBC # BLD AUTO: 13.8 K/UL (ref 4.8–10.8)

## 2025-03-24 PROCEDURE — 700111 HCHG RX REV CODE 636 W/ 250 OVERRIDE (IP): Mod: JZ | Performed by: SURGERY

## 2025-03-24 PROCEDURE — A9270 NON-COVERED ITEM OR SERVICE: HCPCS | Performed by: STUDENT IN AN ORGANIZED HEALTH CARE EDUCATION/TRAINING PROGRAM

## 2025-03-24 PROCEDURE — 80048 BASIC METABOLIC PNL TOTAL CA: CPT

## 2025-03-24 PROCEDURE — 700105 HCHG RX REV CODE 258: Performed by: SURGERY

## 2025-03-24 PROCEDURE — 700111 HCHG RX REV CODE 636 W/ 250 OVERRIDE (IP): Performed by: STUDENT IN AN ORGANIZED HEALTH CARE EDUCATION/TRAINING PROGRAM

## 2025-03-24 PROCEDURE — 36415 COLL VENOUS BLD VENIPUNCTURE: CPT

## 2025-03-24 PROCEDURE — 99233 SBSQ HOSP IP/OBS HIGH 50: CPT | Performed by: STUDENT IN AN ORGANIZED HEALTH CARE EDUCATION/TRAINING PROGRAM

## 2025-03-24 PROCEDURE — 700102 HCHG RX REV CODE 250 W/ 637 OVERRIDE(OP): Performed by: STUDENT IN AN ORGANIZED HEALTH CARE EDUCATION/TRAINING PROGRAM

## 2025-03-24 PROCEDURE — 85027 COMPLETE CBC AUTOMATED: CPT

## 2025-03-24 PROCEDURE — 770001 HCHG ROOM/CARE - MED/SURG/GYN PRIV*

## 2025-03-24 RX ORDER — SODIUM CHLORIDE 9 MG/ML
INJECTION, SOLUTION INTRAVENOUS CONTINUOUS
Status: DISCONTINUED | OUTPATIENT
Start: 2025-03-24 | End: 2025-04-01

## 2025-03-24 RX ADMIN — PIPERACILLIN AND TAZOBACTAM 3.38 G: 3; .375 INJECTION, POWDER, FOR SOLUTION INTRAVENOUS at 22:00

## 2025-03-24 RX ADMIN — APIXABAN 5 MG: 5 TABLET, FILM COATED ORAL at 10:06

## 2025-03-24 RX ADMIN — PIPERACILLIN AND TAZOBACTAM 3.38 G: 3; .375 INJECTION, POWDER, FOR SOLUTION INTRAVENOUS at 14:31

## 2025-03-24 RX ADMIN — APIXABAN 5 MG: 5 TABLET, FILM COATED ORAL at 16:54

## 2025-03-24 RX ADMIN — LOSARTAN POTASSIUM 50 MG: 50 TABLET, FILM COATED ORAL at 05:22

## 2025-03-24 RX ADMIN — FUROSEMIDE 40 MG: 10 INJECTION INTRAMUSCULAR; INTRAVENOUS at 05:21

## 2025-03-24 RX ADMIN — PIPERACILLIN AND TAZOBACTAM 3.38 G: 3; .375 INJECTION, POWDER, FOR SOLUTION INTRAVENOUS at 05:20

## 2025-03-24 RX ADMIN — OXYCODONE 5 MG: 5 TABLET ORAL at 22:05

## 2025-03-24 ASSESSMENT — ENCOUNTER SYMPTOMS
PALPITATIONS: 0
NAUSEA: 0
CHILLS: 0
HEADACHES: 0
FEVER: 0
WEAKNESS: 0
ABDOMINAL PAIN: 1
VOMITING: 0
SHORTNESS OF BREATH: 0

## 2025-03-24 ASSESSMENT — PAIN DESCRIPTION - PAIN TYPE
TYPE: ACUTE PAIN

## 2025-03-24 NOTE — DIETARY
NUTRITION SERVICES: BMI - Pt with BMI >40 (=Body mass index is 41.03 kg/m².), morbid obesity. Weight loss counseling not appropriate in acute care setting.     RECOMMEND - If appropriate at DC please refer to outpatient nutrition services for weight management.

## 2025-03-24 NOTE — PROGRESS NOTES
"Bedside report received.  Assessment complete.  A&O x 4. Patient calls appropriately. Patient is Chinese speaking only.  services in use.   Patient ambulates with standby assist. Bed alarm on.   Patient has 3/10 pain. Patient declining interventions at this time.   Denies N&V. Tolerating cardiac diet.  Patient has lap sites x3 and a RLQ dressing in place. Sites are clean, dry, and intact.   Patient has a RUQ IR drain with percustay dressing in place. Dressing is clean, dry, and intact   + void, + flatus, - BM (last bowel movement 3/21).  Patient denies SOB. On 3.5 L nasal cannula.   SCD's off. Patient ambulatory.     BP 97/56   Pulse 87   Temp 36.5 °C (97.7 °F) (Temporal)   Resp 18   Ht 1.676 m (5' 6\")   Wt 115 kg (254 lb 3.1 oz)   SpO2 93%   BMI 41.03 kg/m²     Review plan with of care with patient. Call light and personal belongings within reach. Hourly rounding in place. All needs met at this time.    " [Hypertension] : hypertension

## 2025-03-24 NOTE — CARE PLAN
The patient is Stable - Low risk of patient condition declining or worsening    Shift Goals  Clinical Goals: pain management, ambulation  Patient Goals: rest, comfort    Progress made toward(s) clinical / shift goals:  Patient stated tolerable pain level throughout shift. Educated patient about pharmacological and non-pharmacological methods of pain management. Patient verbalized understanding. Patient ambulated up to bathroom. Patient rested comfortably intermittently throughout shift.     Problem: Pain - Standard  Goal: Alleviation of pain or a reduction in pain to the patient’s comfort goal  Outcome: Progressing     Problem: Mobility  Goal: Patient's capacity to carry out activities will improve  Outcome: Progressing       Patient is not progressing towards the following goals:

## 2025-03-24 NOTE — CARE PLAN
The patient is Stable - Low risk of patient condition declining or worsening    Shift Goals  Clinical Goals: pain management, ambulation, drain management  Patient Goals: rest, pain control    Progress made toward(s) clinical / shift goals:      Patient's declined pain medications, patient's pain managed with heat packs. IR drain managed per policy, output recorded in flowsheets. Patient ambulated many times to the bathroom. IV ABX administered per MAR.     Problem: Knowledge Deficit - Standard  Goal: Patient and family/care givers will demonstrate understanding of plan of care, disease process/condition, diagnostic tests and medications  Outcome: Progressing     Problem: Hemodynamics  Goal: Patient's hemodynamics, fluid balance and neurologic status will be stable or improve  Outcome: Progressing     Problem: Pain - Standard  Goal: Alleviation of pain or a reduction in pain to the patient’s comfort goal  Outcome: Progressing     Problem: Mobility  Goal: Patient's capacity to carry out activities will improve  Outcome: Progressing

## 2025-03-24 NOTE — PROGRESS NOTES
Hospital Medicine Daily Progress Note    Date of Service  3/24/2025    Chief Complaint  Eduardo Cuellar is a 75 y.o. male admitted 3/21/2025 with surgical site fluid collection    Hospital Course  Eduardo Cuellar is a 75 y.o. male who presented 3/21/2025 with abdominal pain.  Patient has a history of morbid obesity, heart failure with reduced ejection fraction, pulmonary embolism on Eliquis, recent cholecystectomy on March 13.  He was noted to have recent cholecystitis status post cholecystectomy and worsening abdominal distention,  pain, chills, and SOB     At outside facility ER, he was found WBC 18K, CT angiogram chest abdomen pelvis negative for pulmonary embolism, shows a 10 cm fluid collection within the gallbladder fossa , Small right pleural effusion with subsegmental passive atelectasis of the right lung base.     General surgery was consulted, recommends evaluation for bile leak, IR consult for drainage of fluid.      Hungarian spoken,  device was used.     Interval Problem Update  I have seen and examined the patient at bedside  Reported the abdominal pain is better    S/p  IR drain 3/22, output of 5 cc   fluid culture positive for Enterococcus faecalis, follow-up sensitivity  S/p HIDA 3/22, negative for bile leak  Continue Zosyn  I discussed with the general surgery  Wbc 19>14, procalcitonin 0.19  May transition to p.o. antibiotics per sensitivity    I resumed the home Eliquis for history of DVT/PE    I have discussed this patient's plan of care and discharge plan at IDT rounds today with Case Management, Nursing, Nursing leadership, and other members of the IDT team.    Consultants/Specialty  General surgery    Code Status  Full Code    Disposition  The patient is not medically cleared for discharge to home or a post-acute facility.      I have placed the appropriate orders for post-discharge needs.    Review of Systems   All 12 systems were reviewed and negative except as  mentioned above      Physical Exam  Temp:  [36.4 °C (97.5 °F)-36.7 °C (98.1 °F)] 36.6 °C (97.9 °F)  Pulse:  [83-90] 90  Resp:  [18] 18  BP: ()/(56-90) 117/59  SpO2:  [90 %-93 %] 90 %    Physical Exam  Constitutional:       General: He is in acute distress.      Appearance: He is ill-appearing.   HENT:      Head: Normocephalic.      Nose: Nose normal.      Mouth/Throat:      Mouth: Mucous membranes are moist.   Eyes:      Conjunctiva/sclera: Conjunctivae normal.   Cardiovascular:      Rate and Rhythm: Normal rate and regular rhythm.      Pulses: Normal pulses.      Heart sounds: Normal heart sounds.   Abdominal:      General: Bowel sounds are normal.      Palpations: Abdomen is soft.      Tenderness: There is abdominal tenderness. There is no guarding.      Comments: KELLY drain in place   Musculoskeletal:         General: No swelling or tenderness.      Cervical back: Normal range of motion and neck supple.   Skin:     General: Skin is warm.   Neurological:      Mental Status: He is alert and oriented to person, place, and time. Mental status is at baseline.   Psychiatric:         Mood and Affect: Mood normal.         Fluids    Intake/Output Summary (Last 24 hours) at 3/24/2025 1632  Last data filed at 3/24/2025 0957  Gross per 24 hour   Intake 270 ml   Output 5 ml   Net 265 ml        Laboratory  Recent Labs     03/22/25  1022 03/23/25  0042 03/24/25  0100   WBC 19.4* 16.1* 13.8*   RBC 4.74 4.40* 4.56*   HEMOGLOBIN 13.2* 12.4* 12.5*   HEMATOCRIT 40.3* 37.5* 39.3*   MCV 85.0 85.2 86.2   MCH 27.8 28.2 27.4   MCHC 32.8 33.1 31.8*   RDW 46.4 46.3 46.8   PLATELETCT 435 459* 487*   MPV 9.4 10.0 9.6     Recent Labs     03/22/25  1022 03/23/25  0042 03/24/25  0100   SODIUM 138 137 135   POTASSIUM 4.2 4.1 4.2   CHLORIDE 100 99 97   CO2 27 29 28   GLUCOSE 97 111* 105*   BUN 14 14 16   CREATININE 0.74 0.75 0.80   CALCIUM 8.2* 8.0* 8.1*     Recent Labs     03/21/25  2218   INR 1.09               Imaging  DX-CHEST-PORTABLE (1  VIEW)   Final Result      Findings suggestive of pulmonary edema with elevation of the right hemidiaphragm and bibasilar atelectasis/infiltrate.      NM-HEPATOBILIARY SCAN   Final Result      No scintigraphy evidence of bile leak during the 60 minute imaging.      CT-IMAGE-GUIDED DRAIN PERITONEAL   Final Result      1.  CT GUIDED PLACEMENT OF A PERCUTANEOUS DRAINAGE CATHETER IN A GALLBLADDER FOSSA FLUID COLLECTION.   2.  THE CURRENT PLAN IS TO MONITOR DRAINAGE OUTPUT AND OBTAIN A FOLLOWUP CT SCAN IN 5-7 DAYS IF CLINICALLY INDICATED.           Assessment/Plan  * Fluid collection at surgical site- (present on admission)  Assessment & Plan  Patient noted to have a 10 cm fluid collection at the gallbladder fossa   recent cholecystitis status post cholecystectomy  WBC 18K  Likely infected    General surgery was consulted, recommends evaluation for bile leak, IR consult for drainage of fluid.      S/p  IR drain 3/22, output of 5 cc   fluid culture positive for Enterococcus faecalis, follow-up sensitivity  S/p HIDA 3/22, negative for bile leak  Continue Zosyn  I discussed with the general surgery  Wbc 19>14, procalcitonin 0.19  May transition to p.o. antibiotics per sensitivity    Acute on chronic respiratory failure with hypoxia ([facility])  Assessment & Plan  Requires 6 L at the admission  Patient was discharged recently with home oxygen, he cannot remember how much he was on, assume 2 to 3 L  Desaturated to 85% on room air  Continue to wean down oxygen  Continue IV Lasix    History of pulmonary embolism  Assessment & Plan  Home Eliquis on hold for possible procedure    SIRS (systemic inflammatory response syndrome) ([facility])  Assessment & Plan  SIRS criteria identified on my evaluation include:  Tachycardia, with heart rate greater than 90 BPM and Leukocytosis, with WBC greater than 12,000    DVT (deep venous thrombosis) ([facility])  Assessment & Plan  Eliquis held for now due to anticipated procedure in the morning  CTPA at outside  hospital negative for pulmonary embolism    CHF (congestive heart failure) ([facility])  Assessment & Plan  History of heart failure with ejection fraction 45%  Noted to have lower extremity swelling and small pleural effusion seen on CAT scan   Bnp 986  Requires oxygen at admission    Patient was placed on both IV fluid and Lasix at admission  I ordered a chest x-ray, hold IV fluid  Continue to monitor      ACP (advance care planning)  Assessment & Plan  Was discussed by previous provider, full code         VTE prophylaxis: Eliquis    I have performed a physical exam and reviewed and updated ROS and Plan today (3/24/2025). In review of yesterday's note (3/23/2025), there are no changes except as documented above.    I spent greater than 51 minutes for chart review, obtaining history independently, performing medically appropriate examination,  documenting , ordering medications, tests, or procedures, referring and communicating with other health care professionals, Independently interpreting results and communicating results with patient/family/caregiver. More than 50% of time was spent in face-to-face clinical encounter.     ROS  VTE Selection

## 2025-03-24 NOTE — CARE PLAN
The patient is Stable - Low risk of patient condition declining or worsening    Shift Goals  Clinical Goals: pain control, wean O2, drain care  Patient Goals: rest, updates    Progress made toward(s) clinical / shift goals:      Patient's pain will be < 5 for duration of shift with use of PRN pain medication, food, and drain care. Patient will remain free from falls with use of bed alarm, appropriate use of call light, frequent toileting, and having a desk bed.     Problem: Pain - Standard  Goal: Alleviation of pain or a reduction in pain to the patient’s comfort goal  Outcome: Progressing     Problem: Fall Risk  Goal: Patient will remain free from falls  Outcome: Progressing

## 2025-03-24 NOTE — PROGRESS NOTES
Received report from previous shift RN  Assessment complete.  A&O x 4. Patient calls appropriately. Patient is Croatian speaking only.   Patient ambulates with stand by assist. Bed alarm on.   Patient has 7/10 pain. Pain managed with prescribed medications.  Denies N&V. Tolerating cardiac diet.  Surgical RUQ Ir drain, x3 lap sites  + void, + flatus, last BM 3/23 per patient, soft bowel movement  Patient denies SOB.  SCD's refused.  Review plan with of care with patient. Call light and personal belongings with in reach. Hourly rounding in place. All needs met at this time.

## 2025-03-24 NOTE — CARE PLAN
Problem: Knowledge Deficit - Standard  Goal: Patient and family/care givers will demonstrate understanding of plan of care, disease process/condition, diagnostic tests and medications  Outcome: Progressing     Problem: Hemodynamics  Goal: Patient's hemodynamics, fluid balance and neurologic status will be stable or improve  Outcome: Progressing     Problem: Fluid Volume  Goal: Fluid volume balance will be maintained  Outcome: Progressing     Problem: Respiratory  Goal: Patient will achieve/maintain optimum respiratory ventilation and gas exchange  Outcome: Progressing     Problem: Physical Regulation  Goal: Signs and symptoms of infection will decrease  Outcome: Progressing     Problem: Pain - Standard  Goal: Alleviation of pain or a reduction in pain to the patient’s comfort goal  Outcome: Progressing     Problem: Fall Risk  Goal: Patient will remain free from falls  Outcome: Progressing     Problem: Mobility  Goal: Patient's capacity to carry out activities will improve  Outcome: Progressing    Shift Goals  Clinical Goals: pain management, ambulation  Patient Goals: rest, comfort

## 2025-03-24 NOTE — PROGRESS NOTES
Radiology Progress Note   Author: SHAWNEE Cervantes Date & Time created: 3/24/2025  10:31 AM   Date of admission  3/21/2025  Note to reader: this note follows the APSO format rather than the historical SOAP format. Assessment and plan located at the top of the note for ease of use.    Chief Complaint  75 y.o. male admitted 3/21/2025 with abdominal pain      HPI  75-year-old male with past medical history significant for HFrEF, PE on Eliquis, morbid obesity, and recent cholecystitis s/p cholecystectomy (03/13/25) transferred from outside facility for CT finding of gallbladder fossa fluid collection after presenting with continued abdominal pain.  IR was consulted and patient underwent CT-guided gallbladder fossa fluid collection drain with IR Dr. Mooney on 03/22/2025. 80 mL old, cloudy, bloody fluid was drained at time of drain placement    Interval History:   03/23/2025 - GB fossa drain to RUQ with no output in the last 24 hours.  I flushed the drain with 10 mLs NS with moderate amount of maroon fluid returned in the suction bulb. Labs reviewed by me including WBC 16.1, H&H 12.4/37.5, creatinine 0.75.  GB fossa fluid cultures pending.  On ABX.  HIDA scan without evidence of biliary leak. Pt had many questions regarding plan of care and all questions answered to patient's satisfaction using  line.  I reviewed all IDT notes, reviewed all imaging, and discussed patient with bedside RN.    03/24/2025 - GB fossa drain to RUQ with 5 mL maroon output in the last 24 hours.  I flushed the drain with 10 mLs NS with instant return of moderate maroon output upon return to bulb suction. Labs reviewed by me including: WBC 13.8, H&H 12.5/39.3, creatinine 0.8. GB fossa fluid cultures positive for Enterococcus faecalis. On ABX. I reviewed all IDT notes, reviewed all imaging, and discussed patient with Hospitalist and hospital nursing staff.     Assessment/Plan     Principal Problem:    Fluid collection at  surgical site  Active Problems:    Abdominal pain    CHF (congestive heart failure) ([facility])    DVT (deep venous thrombosis) ([facility])    SIRS (systemic inflammatory response syndrome) ([facility])    ACP (advance care planning)    History of pulmonary embolism    SBO (small bowel obstruction) ([facility])    Acute on chronic respiratory failure with hypoxia ([facility])      Plan IR  - Continue to irrigate GB fossa drain with 10 ml of sterile saline each shift  - GB fossa fluid cultures positive for Enterococcus faecalis  - Hospitalist and Gen Surgery following   - Recommend follow up CT scan in 5-7 days (~03/27/25)  - IR will continue to follow    -  -Thank you for allowing Interventional Radiology team to participate in the patients care, if any additional care or requests are needed in the future please do not hesitate to call or place IR order.        Review of Systems  Physical Exam   Review of Systems   Constitutional:  Positive for malaise/fatigue. Negative for chills and fever.   Respiratory:  Negative for shortness of breath.    Cardiovascular:  Negative for chest pain and palpitations.   Gastrointestinal:  Positive for abdominal pain. Negative for nausea and vomiting.   Neurological:  Negative for weakness and headaches.      Vitals:    03/24/25 1006   BP: 117/59   Pulse: 90   Resp:    Temp:    SpO2:         Physical Exam  Constitutional:       General: He is not in acute distress.  Cardiovascular:      Rate and Rhythm: Normal rate.      Pulses: Normal pulses.   Pulmonary:      Effort: Pulmonary effort is normal. No respiratory distress.   Abdominal:      Tenderness: There is abdominal tenderness.      Comments: IR drain to RUQ   Skin:     General: Skin is warm and dry.   Neurological:      General: No focal deficit present.      Mental Status: He is alert and oriented to person, place, and time.   Psychiatric:         Mood and Affect: Mood normal.         Behavior: Behavior normal.             Labs    Recent Labs     03/22/25  1022  "03/23/25  0042 03/24/25  0100   WBC 19.4* 16.1* 13.8*   RBC 4.74 4.40* 4.56*   HEMOGLOBIN 13.2* 12.4* 12.5*   HEMATOCRIT 40.3* 37.5* 39.3*   MCV 85.0 85.2 86.2   MCH 27.8 28.2 27.4   MCHC 32.8 33.1 31.8*   RDW 46.4 46.3 46.8   PLATELETCT 435 459* 487*   MPV 9.4 10.0 9.6     Recent Labs     03/22/25  1022 03/23/25  0042 03/24/25  0100   SODIUM 138 137 135   POTASSIUM 4.2 4.1 4.2   CHLORIDE 100 99 97   CO2 27 29 28   GLUCOSE 97 111* 105*   BUN 14 14 16   CREATININE 0.74 0.75 0.80   CALCIUM 8.2* 8.0* 8.1*     Recent Labs     03/21/25  2218 03/22/25  1022 03/23/25  0042 03/24/25  0100   ALBUMIN 3.0* 2.6*  --   --    TBILIRUBIN 0.9 1.1  --   --    ALKPHOSPHAT 165* 119*  --   --    TOTPROTEIN 6.9 6.4  --   --    ALTSGPT 9 10  --   --    ASTSGOT 36 36  --   --    CREATININE 0.73 0.74 0.75 0.80     DX-CHEST-PORTABLE (1 VIEW)   Final Result      Findings suggestive of pulmonary edema with elevation of the right hemidiaphragm and bibasilar atelectasis/infiltrate.      NM-HEPATOBILIARY SCAN   Final Result      No scintigraphy evidence of bile leak during the 60 minute imaging.      CT-IMAGE-GUIDED DRAIN PERITONEAL   Final Result      1.  CT GUIDED PLACEMENT OF A PERCUTANEOUS DRAINAGE CATHETER IN A GALLBLADDER FOSSA FLUID COLLECTION.   2.  THE CURRENT PLAN IS TO MONITOR DRAINAGE OUTPUT AND OBTAIN A FOLLOWUP CT SCAN IN 5-7 DAYS IF CLINICALLY INDICATED.        INR   Date Value Ref Range Status   03/21/2025 1.09 0.87 - 1.13 Final     Comment:     INR - Non-therapeutic Reference Range: 0.87-1.13  INR - Therapeutic Reference Range: 2.0-4.0       No results found for: \"POCINR\"     Intake/Output Summary (Last 24 hours) at 3/23/2025 0758  Last data filed at 3/23/2025 0714  Gross per 24 hour   Intake 0 ml   Output 0 ml   Net 0 ml      I have personally reviewed the above labs and imaging      I have performed a physical exam and reviewed and updated ROS and Plan today (3/24/2025).     45 minutes in directly providing and coordinating " care and extensive data review.  No time overlap and excludes procedures.

## 2025-03-25 PROBLEM — K81.0 GALLBLADDER ABSCESS: Status: ACTIVE | Noted: 2025-03-21

## 2025-03-25 PROBLEM — I50.22 CHRONIC SYSTOLIC CONGESTIVE HEART FAILURE (HCC): Status: ACTIVE | Noted: 2025-03-21

## 2025-03-25 PROBLEM — D68.318 CIRCULATING ANTICOAGULANT DISORDER (HCC): Status: ACTIVE | Noted: 2025-03-25

## 2025-03-25 PROBLEM — I50.23 ACUTE ON CHRONIC SYSTOLIC CONGESTIVE HEART FAILURE (HCC): Status: ACTIVE | Noted: 2025-03-21

## 2025-03-25 LAB
ANION GAP SERPL CALC-SCNC: 12 MMOL/L (ref 7–16)
BUN SERPL-MCNC: 17 MG/DL (ref 8–22)
CALCIUM SERPL-MCNC: 8.7 MG/DL (ref 8.5–10.5)
CHLORIDE SERPL-SCNC: 99 MMOL/L (ref 96–112)
CO2 SERPL-SCNC: 25 MMOL/L (ref 20–33)
CREAT SERPL-MCNC: 0.78 MG/DL (ref 0.5–1.4)
ERYTHROCYTE [DISTWIDTH] IN BLOOD BY AUTOMATED COUNT: 48.5 FL (ref 35.9–50)
GFR SERPLBLD CREATININE-BSD FMLA CKD-EPI: 93 ML/MIN/1.73 M 2
GLUCOSE SERPL-MCNC: 145 MG/DL (ref 65–99)
HCT VFR BLD AUTO: 42.7 % (ref 42–52)
HGB BLD-MCNC: 13.3 G/DL (ref 14–18)
MCH RBC QN AUTO: 27.4 PG (ref 27–33)
MCHC RBC AUTO-ENTMCNC: 31.1 G/DL (ref 32.3–36.5)
MCV RBC AUTO: 88 FL (ref 81.4–97.8)
PLATELET # BLD AUTO: 516 K/UL (ref 164–446)
PMV BLD AUTO: 9.9 FL (ref 9–12.9)
POTASSIUM SERPL-SCNC: 3.9 MMOL/L (ref 3.6–5.5)
RBC # BLD AUTO: 4.85 M/UL (ref 4.7–6.1)
SODIUM SERPL-SCNC: 136 MMOL/L (ref 135–145)
WBC # BLD AUTO: 11.7 K/UL (ref 4.8–10.8)

## 2025-03-25 PROCEDURE — 36415 COLL VENOUS BLD VENIPUNCTURE: CPT

## 2025-03-25 PROCEDURE — 99233 SBSQ HOSP IP/OBS HIGH 50: CPT

## 2025-03-25 PROCEDURE — 700102 HCHG RX REV CODE 250 W/ 637 OVERRIDE(OP): Performed by: STUDENT IN AN ORGANIZED HEALTH CARE EDUCATION/TRAINING PROGRAM

## 2025-03-25 PROCEDURE — 700111 HCHG RX REV CODE 636 W/ 250 OVERRIDE (IP): Mod: JZ | Performed by: SURGERY

## 2025-03-25 PROCEDURE — 80048 BASIC METABOLIC PNL TOTAL CA: CPT

## 2025-03-25 PROCEDURE — 85027 COMPLETE CBC AUTOMATED: CPT

## 2025-03-25 PROCEDURE — A9270 NON-COVERED ITEM OR SERVICE: HCPCS | Performed by: STUDENT IN AN ORGANIZED HEALTH CARE EDUCATION/TRAINING PROGRAM

## 2025-03-25 PROCEDURE — 700111 HCHG RX REV CODE 636 W/ 250 OVERRIDE (IP): Performed by: STUDENT IN AN ORGANIZED HEALTH CARE EDUCATION/TRAINING PROGRAM

## 2025-03-25 PROCEDURE — 770001 HCHG ROOM/CARE - MED/SURG/GYN PRIV*

## 2025-03-25 PROCEDURE — 700102 HCHG RX REV CODE 250 W/ 637 OVERRIDE(OP)

## 2025-03-25 PROCEDURE — A9270 NON-COVERED ITEM OR SERVICE: HCPCS

## 2025-03-25 PROCEDURE — 700105 HCHG RX REV CODE 258: Performed by: SURGERY

## 2025-03-25 RX ORDER — FUROSEMIDE 20 MG/1
40 TABLET ORAL
Status: DISCONTINUED | OUTPATIENT
Start: 2025-03-26 | End: 2025-03-25

## 2025-03-25 RX ORDER — FUROSEMIDE 20 MG/1
40 TABLET ORAL
Status: DISCONTINUED | OUTPATIENT
Start: 2025-03-25 | End: 2025-04-03 | Stop reason: HOSPADM

## 2025-03-25 RX ADMIN — PIPERACILLIN AND TAZOBACTAM 3.38 G: 3; .375 INJECTION, POWDER, FOR SOLUTION INTRAVENOUS at 13:24

## 2025-03-25 RX ADMIN — FUROSEMIDE 40 MG: 10 INJECTION INTRAMUSCULAR; INTRAVENOUS at 05:15

## 2025-03-25 RX ADMIN — APIXABAN 5 MG: 5 TABLET, FILM COATED ORAL at 18:23

## 2025-03-25 RX ADMIN — FUROSEMIDE 40 MG: 20 TABLET ORAL at 18:26

## 2025-03-25 RX ADMIN — APIXABAN 5 MG: 5 TABLET, FILM COATED ORAL at 05:23

## 2025-03-25 RX ADMIN — PIPERACILLIN AND TAZOBACTAM 3.38 G: 3; .375 INJECTION, POWDER, FOR SOLUTION INTRAVENOUS at 22:04

## 2025-03-25 RX ADMIN — PIPERACILLIN AND TAZOBACTAM 3.38 G: 3; .375 INJECTION, POWDER, FOR SOLUTION INTRAVENOUS at 05:21

## 2025-03-25 RX ADMIN — OXYCODONE 5 MG: 5 TABLET ORAL at 05:16

## 2025-03-25 ASSESSMENT — ENCOUNTER SYMPTOMS
FEVER: 0
ABDOMINAL PAIN: 1
CHILLS: 0
SHORTNESS OF BREATH: 0
HEADACHES: 0
PALPITATIONS: 0
VOMITING: 0
WEAKNESS: 0
NAUSEA: 0

## 2025-03-25 ASSESSMENT — PAIN DESCRIPTION - PAIN TYPE
TYPE: ACUTE PAIN

## 2025-03-25 NOTE — PROGRESS NOTES
Radiology Progress Note   Author: SHAWNEE Cervantes Date & Time created: 3/25/2025  2:33 PM   Date of admission  3/21/2025  Note to reader: this note follows the APSO format rather than the historical SOAP format. Assessment and plan located at the top of the note for ease of use.    Chief Complaint  75 y.o. male admitted 3/21/2025 with abdominal pain      HPI  75-year-old male with past medical history significant for HFrEF, PE on Eliquis, morbid obesity, and recent cholecystitis s/p cholecystectomy (03/13/25) transferred from outside facility for CT finding of gallbladder fossa fluid collection after presenting with continued abdominal pain.  IR was consulted and patient underwent CT-guided gallbladder fossa fluid collection drain with IR Dr. Mooney on 03/22/2025. 80 mL old, cloudy, bloody fluid was drained at time of drain placement    Interval History:   03/23/2025 - GB fossa drain to RUQ with no output in the last 24 hours.  I flushed the drain with 10 mLs NS with moderate amount of maroon fluid returned in the suction bulb. Labs reviewed by me including WBC 16.1, H&H 12.4/37.5, creatinine 0.75.  GB fossa fluid cultures pending.  On ABX.  HIDA scan without evidence of biliary leak. Pt had many questions regarding plan of care and all questions answered to patient's satisfaction using  line.  I reviewed all IDT notes, reviewed all imaging, and discussed patient with bedside RN.    03/24/2025 - GB fossa drain to RUQ with 5 mL maroon output in the last 24 hours.  I flushed the drain with 10 mLs NS with instant return of moderate maroon output upon return to bulb suction. Labs reviewed by me including: WBC 13.8, H&H 12.5/39.3, creatinine 0.8. GB fossa fluid cultures positive for Enterococcus faecalis. On ABX. I reviewed all IDT notes, reviewed all imaging, and discussed patient with Hospitalist and hospital nursing staff.     03/25/2025 - GB fossa drain to RUQ with 30 mL dark maroon output in  the last 24 hours. I flushed the drain with 10 mLs NS with instant return of moderate maroon output upon returning to bulb suction. Labs reviewed by me including: WBC 11.7, H&H 13.3/42.7, creatinine 0.78. GB fossa fluid cultures positive. On ABX. I reviewed all IDT notes and discussed patient care with Hospitalist.    Assessment/Plan     Principal Problem:    Gallbladder abscess  Active Problems:    Abdominal pain    Acute on chronic systolic congestive heart failure (HCC)    DVT (deep venous thrombosis) (HCC)    SIRS (systemic inflammatory response syndrome) (HCC)    ACP (advance care planning)    History of pulmonary embolism    SBO (small bowel obstruction) (HCC)    Acute on chronic respiratory failure with hypoxia (HCC)    Circulating anticoagulant disorder (HCC)      Plan IR  - Continue to irrigate GB fossa drain with 10 ml of sterile saline each shift  - GB fossa fluid cultures positive for Enterococcus faecalis  - Hospitalist and Gen Surgery following   - Recommend follow up CT scan in 5-7 days (~03/27/25)  - IR will continue to follow  -  -Thank you for allowing Interventional Radiology team to participate in the patients care, if any additional care or requests are needed in the future please do not hesitate to call or place IR order.        Review of Systems  Physical Exam   Review of Systems   Constitutional:  Negative for chills, fever and malaise/fatigue.   Respiratory:  Negative for shortness of breath.    Cardiovascular:  Negative for chest pain and palpitations.   Gastrointestinal:  Positive for abdominal pain (improving). Negative for nausea and vomiting.   Neurological:  Negative for weakness and headaches.      Vitals:    03/25/25 0928   BP: 100/62   Pulse: 81   Resp:    Temp:    SpO2:         Physical Exam  Constitutional:       General: He is not in acute distress.  Cardiovascular:      Rate and Rhythm: Normal rate.      Pulses: Normal pulses.   Pulmonary:      Effort: Pulmonary effort is  "normal. No respiratory distress.   Abdominal:      Tenderness: There is abdominal tenderness (mild).      Comments: IR drain to RUQ   Skin:     General: Skin is warm and dry.   Neurological:      General: No focal deficit present.      Mental Status: He is alert and oriented to person, place, and time.   Psychiatric:         Mood and Affect: Mood normal.         Behavior: Behavior normal.             Labs    Recent Labs     03/23/25  0042 03/24/25  0100 03/25/25  0818   WBC 16.1* 13.8* 11.7*   RBC 4.40* 4.56* 4.85   HEMOGLOBIN 12.4* 12.5* 13.3*   HEMATOCRIT 37.5* 39.3* 42.7   MCV 85.2 86.2 88.0   MCH 28.2 27.4 27.4   MCHC 33.1 31.8* 31.1*   RDW 46.3 46.8 48.5   PLATELETCT 459* 487* 516*   MPV 10.0 9.6 9.9     Recent Labs     03/23/25  0042 03/24/25  0100 03/25/25  0818   SODIUM 137 135 136   POTASSIUM 4.1 4.2 3.9   CHLORIDE 99 97 99   CO2 29 28 25   GLUCOSE 111* 105* 145*   BUN 14 16 17   CREATININE 0.75 0.80 0.78   CALCIUM 8.0* 8.1* 8.7     Recent Labs     03/23/25  0042 03/24/25  0100 03/25/25  0818   CREATININE 0.75 0.80 0.78     DX-CHEST-PORTABLE (1 VIEW)   Final Result      Findings suggestive of pulmonary edema with elevation of the right hemidiaphragm and bibasilar atelectasis/infiltrate.      NM-HEPATOBILIARY SCAN   Final Result      No scintigraphy evidence of bile leak during the 60 minute imaging.      CT-IMAGE-GUIDED DRAIN PERITONEAL   Final Result      1.  CT GUIDED PLACEMENT OF A PERCUTANEOUS DRAINAGE CATHETER IN A GALLBLADDER FOSSA FLUID COLLECTION.   2.  THE CURRENT PLAN IS TO MONITOR DRAINAGE OUTPUT AND OBTAIN A FOLLOWUP CT SCAN IN 5-7 DAYS IF CLINICALLY INDICATED.        INR   Date Value Ref Range Status   03/21/2025 1.09 0.87 - 1.13 Final     Comment:     INR - Non-therapeutic Reference Range: 0.87-1.13  INR - Therapeutic Reference Range: 2.0-4.0       No results found for: \"POCINR\"     Intake/Output Summary (Last 24 hours) at 3/23/2025 0758  Last data filed at 3/23/2025 0714  Gross per 24 hour "   Intake 0 ml   Output 0 ml   Net 0 ml      I have personally reviewed the above labs and imaging      I have performed a physical exam and reviewed and updated ROS and Plan today (3/25/2025).     36 minutes in directly providing and coordinating care and extensive data review.  No time overlap and excludes procedures.

## 2025-03-25 NOTE — DISCHARGE PLANNING
Care Transition Team Assessment    Patient is a 75 year-old male admitted for fluid collection at surgical site. Please see patient's H&P for prior medical history.  LMSW met with patient at bedside with nursing A&M Malini Wolf to help translate and to complete assessment. Patient is A&Ox4 and able to verify the information on the face sheet. Patient lives alone in an apartment, [address] (p) [phone]; son and emergency contact. No Advance Directive on file. Prior to admission patient is independent with ADL's and IADL’s. Patient uses 3L of O2 at baseline supplied by [employer]. Patient reported that his son can bring a tank to bedside when MC to DC. The patient's PCP is Dr. Mohsen Tamasaby M.D. Patient denies any SHEN or MH concerns. Patient's confirmed medical coverage via [employer]. Patient has means of transportation when medically cleared.       Information Source  Orientation Level: Oriented X4  Information Given By: Patient  Who is responsible for making decisions for patient? : Patient    Readmission Evaluation  Is this a readmission?: No    Elopement Risk  Legal Hold: No  Ambulatory or Self Mobile in Wheelchair: Yes  Disoriented: No  Psychiatric Symptoms: None  History of Wandering: No  Elopement this Admit: No  Vocalizing Wanting to Leave: No  Displays Behaviors, Body Language Wanting to Leave: No-Not at Risk for Elopement  Elopement Risk: Not at Risk for Elopement    Interdisciplinary Discharge Planning  Lives with - Patient's Self Care Capacity: Unrelated Adult  Patient or legal guardian wants to designate a caregiver: No  Support Systems: Children, Family Member(s), Friends / Neighbors  Housing / Facility: 1 Story Apartment / Condo    Discharge Preparedness  What is your plan after discharge?: Home with help  What are your discharge supports?: Child  Prior Functional Level: Ambulatory, Independent with Activities of Daily Living, Independent with Medication Management  Difficulity  with ADLs: None  Difficulity with IADLs: None    Functional Assesment  Prior Functional Level: Ambulatory, Independent with Activities of Daily Living, Independent with Medication Management    Finances  Financial Barriers to Discharge: No  Prescription Coverage: Yes    Vision / Hearing Impairment  Vision Impairment : Yes  Right Eye Vision: Impaired, Wears Glasses  Left Eye Vision: Impaired, Wears Glasses  Hearing Impairment : No    Advance Directive  Advance Directive?: None    Domestic Abuse  Possible Abuse/Neglect Reported to:: Not Applicable    Psychological Assessment  History of Substance Abuse: None  History of Psychiatric Problems: No  Non-compliant with Treatment: No  Newly Diagnosed Illness: No    Discharge Risks or Barriers  Discharge risks or barriers?: No    Anticipated Discharge Information  Discharge Disposition: Discharged to home/self care (01)

## 2025-03-25 NOTE — PROGRESS NOTES
Bedside report received.  Assessment complete.  A&O x 4. Patient calls appropriately.  services utilized.  Patient ambulates with standby assist. Bed alarm on.   Patient has 6/10 pain. Declines interventions at this time.  Denies N&V. Tolerating diet.  Surgical lap stabs are well approximated with dermabond and HENRY.  IR drain to RUQ, dressing in place, CDI.   Dressing to RLQ old KELLY site, CDI.  + void, + flatus, + BM.  Patient pleasant with staff and assisted to the bathroom and back to bed.  Review plan with of care with patient. Call light and personal belongings within reach. Hourly rounding in place. All needs met at this time.

## 2025-03-25 NOTE — HOSPITAL COURSE
75-year-old male with morbid obesity, HFrEF, PE on Eliquis and recent laparoscopic cholecystectomy C/B cholecystitis 3/13/2025 who presented from [facility] ED with abdominal pain and SOB.    At the outside facility ED, imaging showed a 10 cm fluid collection within the gallbladder fossa favored to represent a postoperative seroma in the absence of clinical signs of infection.  The morphology of the gas within the fossa collection raises the possibility of retained hemostatic material.  There is small right pleural effusion with subsegmental passive atelectasis of the right lung base.  Patient was recommended IR drain and HIDA scan for evaluation of bile leak.  They had no IR services all weekend at [facility] therefore transfer was requested.    [facility] CT-guided drain placement 3/22/2024 with IR.  He was started on IV Zosyn.  HIDA scan showed no evidence of bile leak.  His gallbladder fossa fluid cultures were positive for Enterococcus faecalis.  He was transitioned to amoxicillin.  The repeat CAT scan showed decreased gallbladder fossa fluid-filled collection s/p percutaneous drain placement.  Per discussion with IR, recommended to keep the drain and have a follow-up CAT scan inpatient in 5 to 7 days, tentatively 4/2/2025 versus discharge with close follow-up with surgery. Discussed with surgery, Dr. Fuentes -patient can follow-up with them for the drain.  Advised patient to call their office the day after discharge for this to be scheduled.

## 2025-03-25 NOTE — PROGRESS NOTES
Surgical Progress Note          HPI:  76 y/o male s/p laparoscopic cholecystectomy on 3/15 and subsequently identified to have 10cm GB fossa fluid collection, leukocytosis      Interval Events:  Feels much better today. No nausea, tolerating diet. Cx results show E faecalis. Afebrile, WBC 14 from 16, no LFTs      ROS  Hemodynamics:  Temp (24hrs), Av.6 °C (97.8 °F), Min:36.4 °C (97.5 °F), Max:36.7 °C (98.1 °F)  Temperature: 36.6 °C (97.9 °F)  Pulse  Av.6  Min: 81  Max: 99   Blood Pressure : 105/70     Respiratory:    Respiration: 18, Pulse Oximetry: 90 %        RUL Breath Sounds: Clear, RML Breath Sounds: Clear, RLL Breath Sounds: Diminished, SAMSON Breath Sounds: Clear, LLL Breath Sounds: Diminished  Neuro:  GCS       Fluids:    Intake/Output Summary (Last 24 hours) at 3/24/2025 173  Last data filed at 3/24/2025 0957  Gross per 24 hour   Intake 270 ml   Output 5 ml   Net 265 ml        Current Diet Order   Procedures    Diet Order Diet: Cardiac     Physical Exam  Feels well  Abdomen soft, protuberant, NT in all quadrants  Drain with dark red effluent consistent with hematoma          Labs:  Recent Results (from the past 24 hours)   Basic Metabolic Panel    Collection Time: 25  1:00 AM   Result Value Ref Range    Sodium 135 135 - 145 mmol/L    Potassium 4.2 3.6 - 5.5 mmol/L    Chloride 97 96 - 112 mmol/L    Co2 28 20 - 33 mmol/L    Glucose 105 (H) 65 - 99 mg/dL    Bun 16 8 - 22 mg/dL    Creatinine 0.80 0.50 - 1.40 mg/dL    Calcium 8.1 (L) 8.5 - 10.5 mg/dL    Anion Gap 10.0 7.0 - 16.0   CBC WITHOUT DIFFERENTIAL    Collection Time: 25  1:00 AM   Result Value Ref Range    WBC 13.8 (H) 4.8 - 10.8 K/uL    RBC 4.56 (L) 4.70 - 6.10 M/uL    Hemoglobin 12.5 (L) 14.0 - 18.0 g/dL    Hematocrit 39.3 (L) 42.0 - 52.0 %    MCV 86.2 81.4 - 97.8 fL    MCH 27.4 27.0 - 33.0 pg    MCHC 31.8 (L) 32.3 - 36.5 g/dL    RDW 46.8 35.9 - 50.0 fL    Platelet Count 487 (H) 164 - 446 K/uL    MPV 9.6 9.0 - 12.9 fL   ESTIMATED  GFR    Collection Time: 03/24/25  1:00 AM   Result Value Ref Range    GFR (CKD-EPI) 93 >60 mL/min/1.73 m 2     Medical Decision Making, by Problem:  Active Hospital Problems    Diagnosis     Fluid collection at surgical site [T88.8XXA]      Priority: High    Acute on chronic respiratory failure with hypoxia ([facility]) [J96.21]     History of pulmonary embolism [Z86.711]     SBO (small bowel obstruction) ([facility]) [K56.609]     Abdominal pain [R10.9]     CHF (congestive heart failure) ([facility]) [I50.9]     DVT (deep venous thrombosis) ([facility]) [I82.409]     SIRS (systemic inflammatory response syndrome) ([facility]) [R65.10]     ACP (advance care planning) [Z71.89]      Plan:  74 y/o male with infected fluid collection/GB fossa abscess s/p laparoscopic cholecystectomy  Appreciate medical care  Low fat diet as tolerated  Flush drain q12, care per IR  Cont abx, ok to transition to oral with c/s  Encourage ambulation  DC planning once WBC normal and feels well, eating well  All questions answered    Quality Measures:  Quality-Core Measures    Discussed patient condition with Patient

## 2025-03-25 NOTE — PROGRESS NOTES
Hospital Medicine Daily Progress Note    Date of Service  3/25/2025     services were used in the patient's primary language of [place].     Name or Number: [id number]  Mode of interpretation: Telephone    Content of Interpretation:  SOAP.    Chief Complaint  Eduardo Cuellar is a 75 y.o. male admitted 3/21/2025 with surgical site fluid collection    Hospital Course    75-year-old male with morbid obesity, HFrEF, PE on Eliquis and recent laparoscopic cholecystectomy C/B cholecystitis 3/13/2025 who presented from [facility] ED with abdominal pain and SOB.    At the outside facility ED, imaging showed a 10 cm fluid collection within the gallbladder fossa favored to represent a postoperative seroma in the absence of clinical signs of infection.  The morphology of the gas within the fossa collection raises the possibility of retained hemostatic material.  There is small right pleural effusion with subsegmental passive atelectasis of the right lung base.  Patient was recommended IR drain and HIDA scan for evaluation of bile leak.  They had no IR services all weekend at [facility] therefore transfer was requested.    [facility] CT-guided drain placement 3/22/2024 with IR.  He is recommended a follow-up CT in 5 to 7 days.  He was started on IV Zosyn.  HIDA scan showed no evidence of bile leak.  His gallbladder fossa fluid cultures were positive for Enterococcus faecalis.    Interval Problem Update    3/25  Patient intermittently  NAEO, HDS on 4L NC.  Drain output 30 cc.  Gallbladder fossa cultures culture showed pansensitive Enterococcus faecalis.    Patient feels well overall.  Lying flat and comfortable.  Confirms that he is on 3 L at home and he has supplies.  States he is eating and his abdominal pain improved.  Denies any shortness of breath, having bowel movements and urinating.    Switch to p.o. Lasix.  Continue O2 -wean as tolerated back to baseline.   Currently on 3.5-4 L.  Continue on IV antibiotics.  Plan for repeat CT tentatively 3/27/2025 per IR recs.    I have discussed this patient's plan of care and discharge plan at IDT rounds today with Case Management, Nursing, Nursing leadership, and other members of the IDT team.    Consultants/Specialty  General surgery    Code Status  Full Code    Disposition    I have placed the appropriate orders for post-discharge needs.    Review of Systems   All 12 systems were reviewed and negative except as mentioned above      Physical Exam  Temp:  [36.5 °C (97.7 °F)-36.8 °C (98.2 °F)] 36.5 °C (97.7 °F)  Pulse:  [79-92] 81  Resp:  [18-20] 18  BP: (100-114)/(59-79) 100/62  SpO2:  [92 %-94 %] 94 %    Physical Exam  Constitutional:       General: He is not in acute distress.     Appearance: He is ill-appearing.   Cardiovascular:      Rate and Rhythm: Normal rate and regular rhythm.      Pulses: Normal pulses.      Heart sounds: Normal heart sounds.   Pulmonary:      Effort: No respiratory distress.      Breath sounds: No wheezing.   Abdominal:      General: Bowel sounds are normal.      Palpations: Abdomen is soft.      Tenderness: There is abdominal tenderness. There is no guarding.      Comments: KELLY drain in place.  Abdominal pain improving.   Musculoskeletal:         General: No swelling or tenderness.      Cervical back: Normal range of motion and neck supple.      Right lower leg: No edema.      Left lower leg: No edema.   Skin:     General: Skin is warm.   Neurological:      Mental Status: He is alert and oriented to person, place, and time. Mental status is at baseline.         Fluids    Intake/Output Summary (Last 24 hours) at 3/25/2025 1303  Last data filed at 3/25/2025 0447  Gross per 24 hour   Intake 180 ml   Output 30 ml   Net 150 ml        Laboratory  Recent Labs     03/23/25  0042 03/24/25  0100 03/25/25  0818   WBC 16.1* 13.8* 11.7*   RBC 4.40* 4.56* 4.85   HEMOGLOBIN 12.4* 12.5* 13.3*   HEMATOCRIT 37.5* 39.3*  42.7   MCV 85.2 86.2 88.0   MCH 28.2 27.4 27.4   MCHC 33.1 31.8* 31.1*   RDW 46.3 46.8 48.5   PLATELETCT 459* 487* 516*   MPV 10.0 9.6 9.9     Recent Labs     03/23/25  0042 03/24/25  0100 03/25/25  0818   SODIUM 137 135 136   POTASSIUM 4.1 4.2 3.9   CHLORIDE 99 97 99   CO2 29 28 25   GLUCOSE 111* 105* 145*   BUN 14 16 17   CREATININE 0.75 0.80 0.78   CALCIUM 8.0* 8.1* 8.7                     Imaging  DX-CHEST-PORTABLE (1 VIEW)   Final Result      Findings suggestive of pulmonary edema with elevation of the right hemidiaphragm and bibasilar atelectasis/infiltrate.      NM-HEPATOBILIARY SCAN   Final Result      No scintigraphy evidence of bile leak during the 60 minute imaging.      CT-IMAGE-GUIDED DRAIN PERITONEAL   Final Result      1.  CT GUIDED PLACEMENT OF A PERCUTANEOUS DRAINAGE CATHETER IN A GALLBLADDER FOSSA FLUID COLLECTION.   2.  THE CURRENT PLAN IS TO MONITOR DRAINAGE OUTPUT AND OBTAIN A FOLLOWUP CT SCAN IN 5-7 DAYS IF CLINICALLY INDICATED.           Assessment/Plan  * Gallbladder abscess- (present on admission)  Assessment & Plan  Patient noted to have a 10 cm fluid collection at the gallbladder fossa  Recent cholecystitis status post cholecystectomy  General surgery was consulted, recommends evaluation for bile leak, IR consult for drainage of fluid.      S/p  IR drain 3/22, output of 5 cc  Gallbladder cultures positive for Enterococcus faecalis, pansensitive.  S/p HIDA 3/22, negative for bile leak  Continue IV Zosyn.  Repeat CT scan ~3/27    Circulating anticoagulant disorder (HCC)  Assessment & Plan  On Eliquis at home for history of DVT.    Acute on chronic respiratory failure with hypoxia (HCC)  Assessment & Plan  Patient on 3 L at baseline at home.  Initially required 6 L at the admission  In the setting of possible CHF exacerbation.  S/p IV Lasix.  Switch to home p.o. Lasix dose, 40 mg once daily.    History of pulmonary embolism  Assessment & Plan  Home Eliquis on hold for possible  procedure    ACP (advance care planning)  Assessment & Plan  Was discussed by previous provider, full code    SIRS (systemic inflammatory response syndrome) ([facility])  Assessment & Plan  SIRS criteria identified on my evaluation include:  Tachycardia, with heart rate greater than 90 BPM and Leukocytosis, with WBC greater than 12,000    DVT (deep venous thrombosis) ([facility])  Assessment & Plan  Continue on home Eliquis.  CTPA at outside hospital negative for pulmonary embolism    Acute on chronic systolic congestive heart failure ([facility])  Assessment & Plan  History of heart failure with ejection fraction 45%  Noted to have lower extremity swelling and small pleural effusion seen on CAT scan   Bnp 986 on admission.  S/p IV Lasix  Continue home p.o. Lasix.  Wean oxygen as tolerated back to baseline.           VTE prophylaxis: Eliquis    I have performed a physical exam and reviewed and updated ROS and Plan today (3/25/2025). In review of yesterday's note (3/24/2025), there are no changes except as documented above.    Greater than 52 minutes spent prepping to see patient (e.g. review of tests) obtaining and/or reviewing separately obtained history. Performing a medically appropriate examination and evaluation.  Counseling and educating the patient/family/caregiver.  Ordering medications, tests, or procedures.  Referring and communicating with other health care professionals.  Documenting clinical information in EPIC.  Independently interpreting results and communicating results to patient/family/caregiver.  Care coordination.

## 2025-03-25 NOTE — CARE PLAN
The patient is Stable - Low risk of patient condition declining or worsening    Shift Goals  Clinical Goals: pain management, ambulation, drain care  Patient Goals: pain management, rest    Progress made toward(s) clinical / shift goals:  Pain managed with pharmacological and non-pharmacological pain management methods throughout shift. Patient ambulated up to bathroom. IR drain flushed per order. Output per flowsheets. Patient rested comfortably intermittently throughout shift.     Problem: Pain - Standard  Goal: Alleviation of pain or a reduction in pain to the patient’s comfort goal  Outcome: Progressing     Problem: Mobility  Goal: Patient's capacity to carry out activities will improve  Outcome: Progressing       Patient is not progressing towards the following goals:

## 2025-03-26 LAB
ANION GAP SERPL CALC-SCNC: 9 MMOL/L (ref 7–16)
BACTERIA BLD CULT: NORMAL
BACTERIA BLD CULT: NORMAL
BUN SERPL-MCNC: 16 MG/DL (ref 8–22)
CALCIUM SERPL-MCNC: 8.7 MG/DL (ref 8.5–10.5)
CHLORIDE SERPL-SCNC: 101 MMOL/L (ref 96–112)
CO2 SERPL-SCNC: 26 MMOL/L (ref 20–33)
CREAT SERPL-MCNC: 0.77 MG/DL (ref 0.5–1.4)
ERYTHROCYTE [DISTWIDTH] IN BLOOD BY AUTOMATED COUNT: 47.6 FL (ref 35.9–50)
GFR SERPLBLD CREATININE-BSD FMLA CKD-EPI: 94 ML/MIN/1.73 M 2
GLUCOSE SERPL-MCNC: 101 MG/DL (ref 65–99)
HCT VFR BLD AUTO: 42.3 % (ref 42–52)
HGB BLD-MCNC: 12.9 G/DL (ref 14–18)
MCH RBC QN AUTO: 26.9 PG (ref 27–33)
MCHC RBC AUTO-ENTMCNC: 30.5 G/DL (ref 32.3–36.5)
MCV RBC AUTO: 88.1 FL (ref 81.4–97.8)
PLATELET # BLD AUTO: 511 K/UL (ref 164–446)
PMV BLD AUTO: 9.6 FL (ref 9–12.9)
POTASSIUM SERPL-SCNC: 4.9 MMOL/L (ref 3.6–5.5)
RBC # BLD AUTO: 4.8 M/UL (ref 4.7–6.1)
SIGNIFICANT IND 70042: NORMAL
SIGNIFICANT IND 70042: NORMAL
SITE SITE: NORMAL
SITE SITE: NORMAL
SODIUM SERPL-SCNC: 136 MMOL/L (ref 135–145)
SOURCE SOURCE: NORMAL
SOURCE SOURCE: NORMAL
WBC # BLD AUTO: 11.9 K/UL (ref 4.8–10.8)

## 2025-03-26 PROCEDURE — A9270 NON-COVERED ITEM OR SERVICE: HCPCS | Performed by: STUDENT IN AN ORGANIZED HEALTH CARE EDUCATION/TRAINING PROGRAM

## 2025-03-26 PROCEDURE — 700111 HCHG RX REV CODE 636 W/ 250 OVERRIDE (IP): Mod: JZ | Performed by: SURGERY

## 2025-03-26 PROCEDURE — 99233 SBSQ HOSP IP/OBS HIGH 50: CPT

## 2025-03-26 PROCEDURE — 700102 HCHG RX REV CODE 250 W/ 637 OVERRIDE(OP): Performed by: STUDENT IN AN ORGANIZED HEALTH CARE EDUCATION/TRAINING PROGRAM

## 2025-03-26 PROCEDURE — 36415 COLL VENOUS BLD VENIPUNCTURE: CPT

## 2025-03-26 PROCEDURE — A9270 NON-COVERED ITEM OR SERVICE: HCPCS

## 2025-03-26 PROCEDURE — 700102 HCHG RX REV CODE 250 W/ 637 OVERRIDE(OP)

## 2025-03-26 PROCEDURE — 700105 HCHG RX REV CODE 258: Performed by: SURGERY

## 2025-03-26 PROCEDURE — 80048 BASIC METABOLIC PNL TOTAL CA: CPT

## 2025-03-26 PROCEDURE — 770001 HCHG ROOM/CARE - MED/SURG/GYN PRIV*

## 2025-03-26 PROCEDURE — 85027 COMPLETE CBC AUTOMATED: CPT

## 2025-03-26 RX ORDER — AMOXICILLIN 500 MG/1
1000 CAPSULE ORAL EVERY 12 HOURS
Status: COMPLETED | OUTPATIENT
Start: 2025-03-26 | End: 2025-03-30

## 2025-03-26 RX ADMIN — LOSARTAN POTASSIUM 50 MG: 50 TABLET, FILM COATED ORAL at 05:43

## 2025-03-26 RX ADMIN — AMOXICILLIN 1000 MG: 500 CAPSULE ORAL at 14:23

## 2025-03-26 RX ADMIN — APIXABAN 5 MG: 5 TABLET, FILM COATED ORAL at 05:42

## 2025-03-26 RX ADMIN — FUROSEMIDE 40 MG: 20 TABLET ORAL at 05:43

## 2025-03-26 RX ADMIN — APIXABAN 5 MG: 5 TABLET, FILM COATED ORAL at 18:47

## 2025-03-26 RX ADMIN — OXYCODONE 5 MG: 5 TABLET ORAL at 01:41

## 2025-03-26 RX ADMIN — PIPERACILLIN AND TAZOBACTAM 3.38 G: 3; .375 INJECTION, POWDER, FOR SOLUTION INTRAVENOUS at 05:40

## 2025-03-26 ASSESSMENT — PAIN DESCRIPTION - PAIN TYPE
TYPE: ACUTE PAIN

## 2025-03-26 ASSESSMENT — ENCOUNTER SYMPTOMS
FEVER: 0
PALPITATIONS: 0
CHILLS: 0
HEADACHES: 0
WEAKNESS: 0
VOMITING: 0
SHORTNESS OF BREATH: 0
NAUSEA: 0
ABDOMINAL PAIN: 0

## 2025-03-26 NOTE — CARE PLAN
Problem: Knowledge Deficit - Standard  Goal: Patient and family/care givers will demonstrate understanding of plan of care, disease process/condition, diagnostic tests and medications  Outcome: Progressing     Problem: Hemodynamics  Goal: Patient's hemodynamics, fluid balance and neurologic status will be stable or improve  Outcome: Progressing     Problem: Fluid Volume  Goal: Fluid volume balance will be maintained  Outcome: Progressing     Problem: Urinary - Renal Perfusion  Goal: Ability to achieve and maintain adequate renal perfusion and functioning will improve  Outcome: Progressing     Problem: Respiratory  Goal: Patient will achieve/maintain optimum respiratory ventilation and gas exchange  Outcome: Progressing     Problem: Physical Regulation  Goal: Diagnostic test results will improve  Outcome: Progressing  Goal: Signs and symptoms of infection will decrease  Outcome: Progressing     Problem: Pain - Standard  Goal: Alleviation of pain or a reduction in pain to the patient’s comfort goal  Outcome: Progressing     Problem: Fall Risk  Goal: Patient will remain free from falls  Outcome: Progressing     Problem: Mobility  Goal: Patient's capacity to carry out activities will improve  Outcome: Progressing     Clinical Goals: drain mgmt  Patient Goals: Discharge planning

## 2025-03-26 NOTE — CARE PLAN
The patient is Stable - Low risk of patient condition declining or worsening    Shift Goals  Clinical Goals: POC discussion and questions to be answered before the end of shift  Patient Goals: Discharge planning    Progress made toward(s) clinical / shift goals:  POC discussed with patient, all questions answered at this time.

## 2025-03-26 NOTE — PROGRESS NOTES
Surgical Progress Note          HPI:  76 y/o male s/p laparoscopic cholecystectomy on 3/15 and subsequently identified to have 10cm GB fossa fluid collection, leukocytosis      Interval Events:  Denies nausea, vomiting.  No fever/chills. Tolerating diet.  WBC continues to improve.  Dark fluid in KELLY drain. Cx results show E faecalis.       ROS  Hemodynamics:  Temp (24hrs), Av.8 °C (98.3 °F), Min:36.6 °C (97.9 °F), Max:37.1 °C (98.8 °F)  Temperature: 36.7 °C (98.1 °F)  Pulse  Av.8  Min: 74  Max: 99   Blood Pressure : 102/62     Respiratory:    Respiration: 16, Pulse Oximetry: 92 %        RUL Breath Sounds: Clear, RML Breath Sounds: Clear, RLL Breath Sounds: Diminished, SAMSON Breath Sounds: Clear, LLL Breath Sounds: Diminished  Neuro:  GCS       Fluids:    Intake/Output Summary (Last 24 hours) at 3/24/2025 7308  Last data filed at 3/24/2025 0957  Gross per 24 hour   Intake 270 ml   Output 5 ml   Net 265 ml        Current Diet Order   Procedures    Diet Order Diet: Cardiac     Physical Exam  Feels well  Abdomen soft, non tender, no distention  Drain with dark red effluent consistent with hematoma          Labs:  Recent Results (from the past 24 hours)   Basic Metabolic Panel    Collection Time: 25  3:37 AM   Result Value Ref Range    Sodium 136 135 - 145 mmol/L    Potassium 4.9 3.6 - 5.5 mmol/L    Chloride 101 96 - 112 mmol/L    Co2 26 20 - 33 mmol/L    Glucose 101 (H) 65 - 99 mg/dL    Bun 16 8 - 22 mg/dL    Creatinine 0.77 0.50 - 1.40 mg/dL    Calcium 8.7 8.5 - 10.5 mg/dL    Anion Gap 9.0 7.0 - 16.0   CBC WITHOUT DIFFERENTIAL    Collection Time: 25  3:37 AM   Result Value Ref Range    WBC 11.9 (H) 4.8 - 10.8 K/uL    RBC 4.80 4.70 - 6.10 M/uL    Hemoglobin 12.9 (L) 14.0 - 18.0 g/dL    Hematocrit 42.3 42.0 - 52.0 %    MCV 88.1 81.4 - 97.8 fL    MCH 26.9 (L) 27.0 - 33.0 pg    MCHC 30.5 (L) 32.3 - 36.5 g/dL    RDW 47.6 35.9 - 50.0 fL    Platelet Count 511 (H) 164 - 446 K/uL    MPV 9.6 9.0 - 12.9 fL    ESTIMATED GFR    Collection Time: 03/26/25  3:37 AM   Result Value Ref Range    GFR (CKD-EPI) 94 >60 mL/min/1.73 m 2     Medical Decision Making, by Problem:  Active Hospital Problems    Diagnosis     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]     Acute on chronic respiratory failure with hypoxia ([facility]) [J96.21]     History of pulmonary embolism [Z86.711]     SBO (small bowel obstruction) ([facility]) [K56.609]     Abdominal pain [R10.9]     Acute on chronic systolic congestive heart failure ([facility]) [I50.23]     DVT (deep venous thrombosis) ([facility]) [I82.409]     SIRS (systemic inflammatory response syndrome) ([facility]) [R65.10]     ACP (advance care planning) [Z71.89]     Gallbladder abscess [K81.0]      Plan:  74 y/o male with infected fluid collection/GB fossa abscess s/p laparoscopic cholecystectomy  Appreciate medical care  Low fat diet as tolerated  Flush drain q12, drain care per IR  ABX per primary team  OK for discharge once medically cleared    Quality Measures:  Quality-Core Measures    Discussed patient condition with Dr Fuentes

## 2025-03-26 NOTE — PROGRESS NOTES
Bedside report received.  Assessment complete.  A&O x 4. Patient calls appropriately.  services utilized.  Patient ambulates with standby assist. Bed alarm on.   Patient has 0/10 pain. Declines interventions at this time  Denies N&V. Tolerating diet.  Surgical lap stabs are well approximated with dermabond and HENRY.  IR drain to RUQ, dressing in place, CDI.   Old KELLY site, healing.  + void, + flatus  Patient pleasant with staff and resting in bed.  Review plan with of care with patient. Call light and personal belongings within reach. Hourly rounding in place. All needs met at this time

## 2025-03-26 NOTE — CARE PLAN
The patient is Stable - Low risk of patient condition declining or worsening    Shift Goals  Clinical Goals: Pain management, Drain management  Patient Goals: Discharge planning    Progress made toward(s) clinical / shift goals:  Patient declining pain interventions throughout shift, drain per flow sheet. Discharge planning discussed.

## 2025-03-26 NOTE — PROGRESS NOTES
Radiology Progress Note   Author: SHAWNEE Cervantes Date & Time created: 3/26/2025  1:16 PM   Date of admission  3/21/2025  Note to reader: this note follows the APSO format rather than the historical SOAP format. Assessment and plan located at the top of the note for ease of use.    Chief Complaint  75 y.o. male admitted 3/21/2025 with abdominal pain      HPI  75-year-old male with past medical history significant for HFrEF, PE on Eliquis, morbid obesity, and recent cholecystitis s/p cholecystectomy (03/13/25) transferred from outside facility for CT finding of gallbladder fossa fluid collection after presenting with continued abdominal pain.  IR was consulted and patient underwent CT-guided gallbladder fossa fluid collection drain with IR Dr. Mooney on 03/22/2025. 80 mL old, cloudy, bloody fluid was drained at time of drain placement    Interval History:   03/23/2025 - GB fossa drain to RUQ with no output in the last 24 hours.  I flushed the drain with 10 mLs NS with moderate amount of maroon fluid returned in the suction bulb. Labs reviewed by me including WBC 16.1, H&H 12.4/37.5, creatinine 0.75.  GB fossa fluid cultures pending.  On ABX.  HIDA scan without evidence of biliary leak. Pt had many questions regarding plan of care and all questions answered to patient's satisfaction using  line.  I reviewed all IDT notes, reviewed all imaging, and discussed patient with bedside RN.    03/24/2025 - GB fossa drain to RUQ with 5 mL maroon output in the last 24 hours.  I flushed the drain with 10 mLs NS with instant return of moderate maroon output upon return to bulb suction. Labs reviewed by me including: WBC 13.8, H&H 12.5/39.3, creatinine 0.8. GB fossa fluid cultures positive for Enterococcus faecalis. On ABX. I reviewed all IDT notes, reviewed all imaging, and discussed patient with Hospitalist and hospital nursing staff.     03/25/2025 - GB fossa drain to RUQ with 30 mL dark maroon output in  the last 24 hours. I flushed the drain with 10 mLs NS with instant return of moderate maroon output upon returning to bulb suction. Labs reviewed by me including: WBC 11.7, H&H 13.3/42.7, creatinine 0.78. GB fossa fluid cultures positive. On ABX. I reviewed all IDT notes and discussed patient care with Hospitalist.    03/26/2025 - GB fossa drain to RUQ with 30 mL dark maroon/brown output in the last 24 hours. I flushed the drain with 10 mLs NS with instant return of light maroon output upon returning to bulb suction. Labs reviewed by me including: WBC 11.9, H&H 12.9/42.3, creatinine 0.77. GB fossa fluid cultures positive. On ABX. I reviewed all IDT notes and discussed patient care with hospital nursing staff.    Assessment/Plan     Principal Problem:    Gallbladder abscess  Active Problems:    Abdominal pain    Acute on chronic systolic congestive heart failure ([facility])    DVT (deep venous thrombosis) ([facility])    SIRS (systemic inflammatory response syndrome) ([facility])    ACP (advance care planning)    History of pulmonary embolism    SBO (small bowel obstruction) ([facility])    Acute on chronic respiratory failure with hypoxia ([facility])    Circulating anticoagulant disorder ([facility])      Plan IR  - Continue to irrigate GB fossa drain with 10 ml of sterile saline each shift  - GB fossa fluid cultures positive for Enterococcus faecalis  - Hospitalist and Gen Surgery following   - Follow up CT scan tomorrow (03/27/25) - order placed by hospitalist  - Continue to monitor drain output  - IR will continue to follow  -  -Thank you for allowing Interventional Radiology team to participate in the patients care, if any additional care or requests are needed in the future please do not hesitate to call or place IR order.        Review of Systems  Physical Exam   Review of Systems   Constitutional:  Negative for chills, fever and malaise/fatigue.   Respiratory:  Negative for shortness of breath.    Cardiovascular:  Negative for chest pain and  palpitations.   Gastrointestinal:  Negative for abdominal pain, nausea and vomiting.   Neurological:  Negative for weakness and headaches.      Vitals:    03/26/25 0821   BP: 102/62   Pulse: 86   Resp:    Temp:    SpO2:         Physical Exam  Constitutional:       General: He is not in acute distress.  Cardiovascular:      Rate and Rhythm: Normal rate.      Pulses: Normal pulses.   Pulmonary:      Effort: Pulmonary effort is normal. No respiratory distress.   Abdominal:      Tenderness: There is abdominal tenderness (mild at drain insertion site).      Comments: IR drain to RUQ   Skin:     General: Skin is warm and dry.   Neurological:      General: No focal deficit present.      Mental Status: He is alert and oriented to person, place, and time.   Psychiatric:         Mood and Affect: Mood normal.         Behavior: Behavior normal.          Labs    Recent Labs     03/24/25  0100 03/25/25 0818 03/26/25 0337   WBC 13.8* 11.7* 11.9*   RBC 4.56* 4.85 4.80   HEMOGLOBIN 12.5* 13.3* 12.9*   HEMATOCRIT 39.3* 42.7 42.3   MCV 86.2 88.0 88.1   MCH 27.4 27.4 26.9*   MCHC 31.8* 31.1* 30.5*   RDW 46.8 48.5 47.6   PLATELETCT 487* 516* 511*   MPV 9.6 9.9 9.6     Recent Labs     03/24/25  0100 03/25/25 0818 03/26/25  0337   SODIUM 135 136 136   POTASSIUM 4.2 3.9 4.9   CHLORIDE 97 99 101   CO2 28 25 26   GLUCOSE 105* 145* 101*   BUN 16 17 16   CREATININE 0.80 0.78 0.77   CALCIUM 8.1* 8.7 8.7     Recent Labs     03/24/25  0100 03/25/25 0818 03/26/25  0337   CREATININE 0.80 0.78 0.77     DX-CHEST-PORTABLE (1 VIEW)   Final Result      Findings suggestive of pulmonary edema with elevation of the right hemidiaphragm and bibasilar atelectasis/infiltrate.      NM-HEPATOBILIARY SCAN   Final Result      No scintigraphy evidence of bile leak during the 60 minute imaging.      CT-IMAGE-GUIDED DRAIN PERITONEAL   Final Result      1.  CT GUIDED PLACEMENT OF A PERCUTANEOUS DRAINAGE CATHETER IN A GALLBLADDER FOSSA FLUID COLLECTION.   2.  THE  "CURRENT PLAN IS TO MONITOR DRAINAGE OUTPUT AND OBTAIN A FOLLOWUP CT SCAN IN 5-7 DAYS IF CLINICALLY INDICATED.        INR   Date Value Ref Range Status   03/21/2025 1.09 0.87 - 1.13 Final     Comment:     INR - Non-therapeutic Reference Range: 0.87-1.13  INR - Therapeutic Reference Range: 2.0-4.0       No results found for: \"POCINR\"     Intake/Output Summary (Last 24 hours) at 3/23/2025 0758  Last data filed at 3/23/2025 0714  Gross per 24 hour   Intake 0 ml   Output 0 ml   Net 0 ml      I have personally reviewed the above labs and imaging      I have performed a physical exam and reviewed and updated ROS and Plan today (3/26/2025).     35 minutes in directly providing and coordinating care and extensive data review.  No time overlap and excludes procedures.   "

## 2025-03-26 NOTE — CARE PLAN
The patient is Stable - Low risk of patient condition declining or worsening    Shift Goals  Clinical Goals: drain mgmt  Patient Goals: Discharge planning    Progress made toward(s) clinical / shift goals:  Pt is A&Ox4 and agrees with POC for this shift. Pt is Kyrgyz speaking. Pt is on 3L o2 nasal cannula, baseline is 3L o2, pt saturating well. 0-10 pain scale used to evaluate pain level. Pt has reported pain during this shift and appropriate medication administered for pain see MAR. Pt being reassessed for pain as needed. Fall risk precautions are in place, bed alarm on, non-skid foot wear, bed brakes on and bed in lowest position, call light and belongings are in reach of pt. Pt calls appropriately.    Patient is not progressing towards the following goals:NA

## 2025-03-26 NOTE — PROGRESS NOTES
Hospital Medicine Daily Progress Note    Date of Service  3/26/2025     services were used in the patient's primary language of [place].     Name or Number: Eris  Mode of interpretation: Telephone    Content of Interpretation:  SOAP      Chief Complaint  Eduardo Cuellar is a 75 y.o. male admitted 3/21/2025 with surgical site fluid collection    Hospital Course    75-year-old male with morbid obesity, HFrEF, PE on Eliquis and recent laparoscopic cholecystectomy C/B cholecystitis 3/13/2025 who presented from [facility] ED with abdominal pain and SOB.    At the outside facility ED, imaging showed a 10 cm fluid collection within the gallbladder fossa favored to represent a postoperative seroma in the absence of clinical signs of infection.  The morphology of the gas within the fossa collection raises the possibility of retained hemostatic material.  There is small right pleural effusion with subsegmental passive atelectasis of the right lung base.  Patient was recommended IR drain and HIDA scan for evaluation of bile leak.  They had no IR services all weekend at [facility] therefore transfer was requested.    In [facility], patient underwent CT-guided drain placement 3/22/2024 with IR.  He is recommended a follow-up CT in 5 to 7 days.  He was started on IV Zosyn.  HIDA scan showed no evidence of bile leak.  His gallbladder fossa fluid cultures were positive for Enterococcus faecalis.    Interval Problem Update    3/26  NAEO.  HDS, on 3L which is around patient's baseline.  Drain output 30 cc overnight.  Urinating and stooling.  Metabolic panel reviewed, overall unremarkable.  WBC 11.9, hemoglobin 12.9, platelet 511.    Patient feels well overall, abdominal pain controlled on current regimen.  Denies any fevers, nausea/vomiting.  Lying flat without dyspnea.    DC Zosyn.  Start amoxicillin.  Discussed with ID pharmacy.  Continue p.o. Lasix.  Plan for repeat CT scan tomorrow 3/27.    I  have discussed this patient's plan of care and discharge plan at IDT rounds today with Case Management, Nursing, Nursing leadership, and other members of the IDT team.    Consultants/Specialty  General surgery    Code Status  Full Code    Disposition    I have placed the appropriate orders for post-discharge needs.    Review of Systems   All 12 systems were reviewed and negative except as mentioned above      Physical Exam  Temp:  [36.6 °C (97.9 °F)-37.1 °C (98.8 °F)] 36.7 °C (98.1 °F)  Pulse:  [74-86] 86  Resp:  [16-24] 16  BP: ()/(62-85) 102/62  SpO2:  [91 %-95 %] 92 %    Physical Exam  Constitutional:       General: He is not in acute distress.     Appearance: He is ill-appearing.   Cardiovascular:      Rate and Rhythm: Normal rate and regular rhythm.      Pulses: Normal pulses.      Heart sounds: Normal heart sounds.   Pulmonary:      Effort: No respiratory distress.      Breath sounds: No wheezing.   Abdominal:      General: Bowel sounds are normal.      Palpations: Abdomen is soft.      Tenderness: There is abdominal tenderness. There is no guarding.      Comments: KELLY drain in place.  Mild abdominal pain.   Musculoskeletal:         General: No swelling or tenderness.      Cervical back: Normal range of motion and neck supple.      Right lower leg: No edema.      Left lower leg: No edema.   Skin:     General: Skin is warm.   Neurological:      Mental Status: He is alert and oriented to person, place, and time. Mental status is at baseline.         Fluids    Intake/Output Summary (Last 24 hours) at 3/26/2025 1219  Last data filed at 3/26/2025 0707  Gross per 24 hour   Intake 240 ml   Output 30 ml   Net 210 ml        Laboratory  Recent Labs     03/24/25  0100 03/25/25  0818 03/26/25  0337   WBC 13.8* 11.7* 11.9*   RBC 4.56* 4.85 4.80   HEMOGLOBIN 12.5* 13.3* 12.9*   HEMATOCRIT 39.3* 42.7 42.3   MCV 86.2 88.0 88.1   MCH 27.4 27.4 26.9*   MCHC 31.8* 31.1* 30.5*   RDW 46.8 48.5 47.6   PLATELETCT 487* 516* 511*    MPV 9.6 9.9 9.6     Recent Labs     03/24/25  0100 03/25/25  0818 03/26/25  0337   SODIUM 135 136 136   POTASSIUM 4.2 3.9 4.9   CHLORIDE 97 99 101   CO2 28 25 26   GLUCOSE 105* 145* 101*   BUN 16 17 16   CREATININE 0.80 0.78 0.77   CALCIUM 8.1* 8.7 8.7                     Imaging  DX-CHEST-PORTABLE (1 VIEW)   Final Result      Findings suggestive of pulmonary edema with elevation of the right hemidiaphragm and bibasilar atelectasis/infiltrate.      NM-HEPATOBILIARY SCAN   Final Result      No scintigraphy evidence of bile leak during the 60 minute imaging.      CT-IMAGE-GUIDED DRAIN PERITONEAL   Final Result      1.  CT GUIDED PLACEMENT OF A PERCUTANEOUS DRAINAGE CATHETER IN A GALLBLADDER FOSSA FLUID COLLECTION.   2.  THE CURRENT PLAN IS TO MONITOR DRAINAGE OUTPUT AND OBTAIN A FOLLOWUP CT SCAN IN 5-7 DAYS IF CLINICALLY INDICATED.           Assessment/Plan  * Gallbladder abscess- (present on admission)  Assessment & Plan  Patient noted to have a 10 cm fluid collection at the gallbladder fossa  Recent cholecystitis status post cholecystectomy  General surgery was consulted, recommends evaluation for bile leak, IR consult for drainage of fluid.    S/p HIDA 3/22, negative for bile leak.  S/p  IR drain 3/22, output of 5 cc  Gallbladder cultures positive for Enterococcus faecalis, pansensitive.  Continue multimodal pain regimen.  On IV Dilaudid and p.o. oxy as needed. Continue pulse oximetry monitoring to monitor for hypoxia and respiratory depression while receiving IV narcotic medications  DC IV Zosyn - switch to amoxicillin  Repeat CT scan tomorrow 3/27    Circulating anticoagulant disorder ([facility])  Assessment & Plan  On Eliquis at home for history of DVT.    Acute on chronic respiratory failure with hypoxia ([facility])  Assessment & Plan  Patient on 3 L at baseline at home.  Initially required 6 L at the admission  In the setting of possible CHF exacerbation.  S/p IV Lasix.  Switch to home p.o. Lasix dose, 40 mg once  daily.    3/26  Now stable on 3L.  Continue p.o. Lasix 40 mg once daily.      History of pulmonary embolism  Assessment & Plan  Home Eliquis on hold for possible procedure    ACP (advance care planning)  Assessment & Plan  Was discussed by previous provider, full code    SIRS (systemic inflammatory response syndrome) ([facility])  Assessment & Plan  SIRS criteria identified on my evaluation include:  Tachycardia, with heart rate greater than 90 BPM and Leukocytosis, with WBC greater than 12,000  Improved    DVT (deep venous thrombosis) ([facility])  Assessment & Plan  Continue on home Eliquis.  CTPA at outside hospital negative for pulmonary embolism    Acute on chronic systolic congestive heart failure ([facility])  Assessment & Plan  History of heart failure with ejection fraction 45%  Noted to have lower extremity swelling and small pleural effusion seen on CAT scan   Bnp 986 on admission.  S/p IV Lasix  Continue home p.o. Lasix.  Wean oxygen as tolerated back to baseline.           VTE prophylaxis: Eliquis    I have performed a physical exam and reviewed and updated ROS and Plan today (3/26/2025). In review of yesterday's note (3/25/2025), there are no changes except as documented above.

## 2025-03-27 ENCOUNTER — APPOINTMENT (OUTPATIENT)
Dept: RADIOLOGY | Facility: MEDICAL CENTER | Age: 75
DRG: 862 | End: 2025-03-27
Payer: MEDICARE

## 2025-03-27 PROBLEM — Z90.49 S/P CHOLECYSTECTOMY: Status: ACTIVE | Noted: 2025-03-27

## 2025-03-27 PROBLEM — R91.8 ABNORMAL FINDING ON LUNG IMAGING: Status: ACTIVE | Noted: 2025-03-27

## 2025-03-27 PROBLEM — N40.0 ENLARGED PROSTATE: Status: ACTIVE | Noted: 2025-03-27

## 2025-03-27 PROBLEM — K40.20 BILATERAL INGUINAL HERNIA WITHOUT OBSTRUCTION OR GANGRENE: Status: ACTIVE | Noted: 2025-03-27

## 2025-03-27 LAB
ANION GAP SERPL CALC-SCNC: 11 MMOL/L (ref 7–16)
BUN SERPL-MCNC: 17 MG/DL (ref 8–22)
CALCIUM SERPL-MCNC: 8.8 MG/DL (ref 8.5–10.5)
CHLORIDE SERPL-SCNC: 100 MMOL/L (ref 96–112)
CO2 SERPL-SCNC: 26 MMOL/L (ref 20–33)
CREAT SERPL-MCNC: 0.77 MG/DL (ref 0.5–1.4)
ERYTHROCYTE [DISTWIDTH] IN BLOOD BY AUTOMATED COUNT: 48.1 FL (ref 35.9–50)
GFR SERPLBLD CREATININE-BSD FMLA CKD-EPI: 94 ML/MIN/1.73 M 2
GLUCOSE SERPL-MCNC: 102 MG/DL (ref 65–99)
HCT VFR BLD AUTO: 42 % (ref 42–52)
HGB BLD-MCNC: 13.3 G/DL (ref 14–18)
MCH RBC QN AUTO: 28 PG (ref 27–33)
MCHC RBC AUTO-ENTMCNC: 31.7 G/DL (ref 32.3–36.5)
MCV RBC AUTO: 88.4 FL (ref 81.4–97.8)
PLATELET # BLD AUTO: 501 K/UL (ref 164–446)
PMV BLD AUTO: 10 FL (ref 9–12.9)
POTASSIUM SERPL-SCNC: 4.7 MMOL/L (ref 3.6–5.5)
PROCALCITONIN SERPL-MCNC: 0.1 NG/ML
RBC # BLD AUTO: 4.75 M/UL (ref 4.7–6.1)
SODIUM SERPL-SCNC: 137 MMOL/L (ref 135–145)
WBC # BLD AUTO: 11.5 K/UL (ref 4.8–10.8)

## 2025-03-27 PROCEDURE — 80048 BASIC METABOLIC PNL TOTAL CA: CPT

## 2025-03-27 PROCEDURE — A9270 NON-COVERED ITEM OR SERVICE: HCPCS

## 2025-03-27 PROCEDURE — 74177 CT ABD & PELVIS W/CONTRAST: CPT

## 2025-03-27 PROCEDURE — A9270 NON-COVERED ITEM OR SERVICE: HCPCS | Performed by: STUDENT IN AN ORGANIZED HEALTH CARE EDUCATION/TRAINING PROGRAM

## 2025-03-27 PROCEDURE — 700102 HCHG RX REV CODE 250 W/ 637 OVERRIDE(OP): Performed by: STUDENT IN AN ORGANIZED HEALTH CARE EDUCATION/TRAINING PROGRAM

## 2025-03-27 PROCEDURE — 85027 COMPLETE CBC AUTOMATED: CPT

## 2025-03-27 PROCEDURE — 36415 COLL VENOUS BLD VENIPUNCTURE: CPT

## 2025-03-27 PROCEDURE — 99233 SBSQ HOSP IP/OBS HIGH 50: CPT

## 2025-03-27 PROCEDURE — 770001 HCHG ROOM/CARE - MED/SURG/GYN PRIV*

## 2025-03-27 PROCEDURE — 700117 HCHG RX CONTRAST REV CODE 255

## 2025-03-27 PROCEDURE — 700102 HCHG RX REV CODE 250 W/ 637 OVERRIDE(OP)

## 2025-03-27 PROCEDURE — 84145 PROCALCITONIN (PCT): CPT

## 2025-03-27 RX ORDER — OXYCODONE HYDROCHLORIDE 5 MG/1
5 TABLET ORAL EVERY 6 HOURS PRN
Qty: 12 TABLET | Refills: 0 | OUTPATIENT
Start: 2025-03-27 | End: 2025-03-30

## 2025-03-27 RX ORDER — AMOXICILLIN 500 MG/1
1000 CAPSULE ORAL EVERY 12 HOURS
Qty: 12 CAPSULE | Refills: 0 | OUTPATIENT
Start: 2025-03-27 | End: 2025-03-30

## 2025-03-27 RX ADMIN — AMOXICILLIN 1000 MG: 500 CAPSULE ORAL at 05:36

## 2025-03-27 RX ADMIN — OXYCODONE 5 MG: 5 TABLET ORAL at 14:28

## 2025-03-27 RX ADMIN — OXYCODONE 5 MG: 5 TABLET ORAL at 17:18

## 2025-03-27 RX ADMIN — OXYCODONE 5 MG: 5 TABLET ORAL at 08:35

## 2025-03-27 RX ADMIN — OXYCODONE 5 MG: 5 TABLET ORAL at 05:43

## 2025-03-27 RX ADMIN — OXYCODONE 5 MG: 5 TABLET ORAL at 23:42

## 2025-03-27 RX ADMIN — FUROSEMIDE 40 MG: 20 TABLET ORAL at 05:33

## 2025-03-27 RX ADMIN — LOSARTAN POTASSIUM 50 MG: 50 TABLET, FILM COATED ORAL at 05:33

## 2025-03-27 RX ADMIN — CARVEDILOL 6.25 MG: 6.25 TABLET, FILM COATED ORAL at 17:11

## 2025-03-27 RX ADMIN — AMOXICILLIN 1000 MG: 500 CAPSULE ORAL at 17:09

## 2025-03-27 RX ADMIN — IOHEXOL 100 ML: 350 INJECTION, SOLUTION INTRAVENOUS at 09:30

## 2025-03-27 RX ADMIN — OXYCODONE 5 MG: 5 TABLET ORAL at 11:38

## 2025-03-27 RX ADMIN — CARVEDILOL 6.25 MG: 6.25 TABLET, FILM COATED ORAL at 05:33

## 2025-03-27 RX ADMIN — APIXABAN 5 MG: 5 TABLET, FILM COATED ORAL at 05:33

## 2025-03-27 RX ADMIN — APIXABAN 5 MG: 5 TABLET, FILM COATED ORAL at 17:09

## 2025-03-27 ASSESSMENT — PAIN DESCRIPTION - PAIN TYPE
TYPE: ACUTE PAIN

## 2025-03-27 ASSESSMENT — ENCOUNTER SYMPTOMS
NAUSEA: 0
WEAKNESS: 0
VOMITING: 0
ABDOMINAL PAIN: 0
PALPITATIONS: 0
HEADACHES: 0
SHORTNESS OF BREATH: 0
CHILLS: 0
FEVER: 0

## 2025-03-27 NOTE — PROGRESS NOTES
Virtual Nurse rounding complete using  services ID#[id number].    Round Needs: Other: Patient would like to take a shower later today. and Notified of Patient Needs: CNA

## 2025-03-27 NOTE — CARE PLAN
The patient is Stable - Low risk of patient condition declining or worsening    Shift Goals  Clinical Goals: drain mgmt, monitor VS  Patient Goals: rest      Problem: Knowledge Deficit - Standard  Goal: Patient and family/care givers will demonstrate understanding of plan of care, disease process/condition, diagnostic tests and medications  Description: Target End Date:  1-3 days or as soon as patient condition allowsDocument in Patient Education1.  Patient and family/caregiver oriented to unit, equipment, visitation policy and means for communicating concern2.  Complete/review Learning Assessment3.  Assess knowledge level of disease process/condition, treatment plan, diagnostic tests and medications4.  Explain disease process/condition, treatment plan, diagnostic tests and medications  Outcome: Progressing     Progress made toward(s) clinical / shift goals:  Pt. Was educated on pharmacological and non-pharmacological modalities. Pt. Declined of pain intervention. Drain was monitored and drained once during shift.  was used at bedside. Questions and concerns were addressed. Pt. Verbalized understanding. Pt. Rested intermittently throughout shift.     Patient is not progressing towards the following goals:

## 2025-03-27 NOTE — PROGRESS NOTES
Hospital Medicine Daily Progress Note    Date of Service  3/27/2025    Chief Complaint  Eduardo Cuellar is a 75 y.o. male admitted 3/21/2025 with surgical site fluid collection    Hospital Course    75-year-old male with morbid obesity, HFrEF, PE on Eliquis and recent laparoscopic cholecystectomy C/B cholecystitis 3/13/2025 who presented from [facility] ED with abdominal pain and SOB.    At the outside facility ED, imaging showed a 10 cm fluid collection within the gallbladder fossa favored to represent a postoperative seroma in the absence of clinical signs of infection.  The morphology of the gas within the fossa collection raises the possibility of retained hemostatic material.  There is small right pleural effusion with subsegmental passive atelectasis of the right lung base.  Patient was recommended IR drain and HIDA scan for evaluation of bile leak.  They had no IR services all weekend at [facility] therefore transfer was requested.    [facility], patient underwent CT-guided drain placement 3/22/2024 with IR.  He is recommended a follow-up CT in 5 to 7 days.  He was started on IV Zosyn.  HIDA scan showed no evidence of bile leak.  His gallbladder fossa fluid cultures were positive for Enterococcus faecalis.    Interval Problem Update    3/27  Per surgery, okay for discharge from their perspective once medically cleared.  Increase to 5L NC overnight.  33 cc KELLY drain output.  WBC 11.5, hemoglobin 13.3, platelet 501.  Metabolic panel overall unremarkable.  Repeat CT A/P showed decreased gallbladder fossa fluid-filled collection s/p percutaneous drain placement.    Feels well overall.  Denies any SOB/chest pain/leg swelling.  Pain controlled.    Patient satting well on 3L when decreased at bedside -continue as tolerated.  Plan for follow-up CT in 5 to 7 days, tentatively 4/2/2025  Continue amoxicillin    I have discussed this patient's plan of care and discharge plan at IDT rounds today with Case  Management, Nursing, Nursing leadership, and other members of the IDT team.    Consultants/Specialty  General surgery    Code Status  Full Code    Disposition    I have placed the appropriate orders for post-discharge needs.    Review of Systems   All 12 systems were reviewed and negative except as mentioned above      Physical Exam  Temp:  [36.5 °C (97.7 °F)-37 °C (98.6 °F)] 36.9 °C (98.4 °F)  Pulse:  [79-91] 91  Resp:  [16-17] 17  BP: (103-133)/(70-78) 123/78  SpO2:  [93 %-95 %] 95 %    Physical Exam  Constitutional:       General: He is not in acute distress.     Appearance: He is ill-appearing.   Cardiovascular:      Rate and Rhythm: Normal rate and regular rhythm.      Pulses: Normal pulses.      Heart sounds: Normal heart sounds.   Pulmonary:      Effort: No respiratory distress.      Breath sounds: No wheezing.   Abdominal:      General: Bowel sounds are normal.      Palpations: Abdomen is soft.      Tenderness: There is abdominal tenderness. There is no guarding.      Comments: KELLY drain in place.  Mild abdominal pain.   Musculoskeletal:         General: No swelling or tenderness.      Cervical back: Normal range of motion and neck supple.      Right lower leg: No edema.      Left lower leg: No edema.   Skin:     General: Skin is warm.   Neurological:      Mental Status: He is alert and oriented to person, place, and time. Mental status is at baseline.         Fluids    Intake/Output Summary (Last 24 hours) at 3/27/2025 1154  Last data filed at 3/27/2025 0800  Gross per 24 hour   Intake 420 ml   Output 33 ml   Net 387 ml        Laboratory  Recent Labs     03/25/25  0818 03/26/25  0337 03/27/25  0039   WBC 11.7* 11.9* 11.5*   RBC 4.85 4.80 4.75   HEMOGLOBIN 13.3* 12.9* 13.3*   HEMATOCRIT 42.7 42.3 42.0   MCV 88.0 88.1 88.4   MCH 27.4 26.9* 28.0   MCHC 31.1* 30.5* 31.7*   RDW 48.5 47.6 48.1   PLATELETCT 516* 511* 501*   MPV 9.9 9.6 10.0     Recent Labs     03/25/25  0818 03/26/25  0337 03/27/25  0039   SODIUM  136 136 137   POTASSIUM 3.9 4.9 4.7   CHLORIDE 99 101 100   CO2 25 26 26   GLUCOSE 145* 101* 102*   BUN 17 16 17   CREATININE 0.78 0.77 0.77   CALCIUM 8.7 8.7 8.8                     Imaging  CT-ABDOMEN-PELVIS WITH   Final Result      1.  Decreased gallbladder fossa fluid collection status post percutaneous drain placement now measuring 3.9 x 5.7 cm, previously 10.2 x 6.8 cm.   2.  Small right effusion, partially visualized.   3.  Bilateral lower lobe atelectasis and/or consolidation, underlying infection is possible.   4.  Unchanged trace perihepatic fluid adjacent to the dome of the liver.   5.  Status post cholecystectomy.   6.  Small fat-containing bilateral internal hernias.   7.  Enlarged prostate.   8.  Atherosclerosis.      DX-CHEST-PORTABLE (1 VIEW)   Final Result      Findings suggestive of pulmonary edema with elevation of the right hemidiaphragm and bibasilar atelectasis/infiltrate.      NM-HEPATOBILIARY SCAN   Final Result      No scintigraphy evidence of bile leak during the 60 minute imaging.      CT-IMAGE-GUIDED DRAIN PERITONEAL   Final Result      1.  CT GUIDED PLACEMENT OF A PERCUTANEOUS DRAINAGE CATHETER IN A GALLBLADDER FOSSA FLUID COLLECTION.   2.  THE CURRENT PLAN IS TO MONITOR DRAINAGE OUTPUT AND OBTAIN A FOLLOWUP CT SCAN IN 5-7 DAYS IF CLINICALLY INDICATED.           Assessment/Plan  * Gallbladder abscess- (present on admission)  Assessment & Plan  Patient noted to have a 10 cm fluid collection at the gallbladder fossa  Recent cholecystitis status post cholecystectomy  General surgery was consulted, recommends evaluation for bile leak, IR consult for drainage of fluid.    S/p HIDA 3/22, negative for bile leak.  S/p  IR drain 3/22, output of 5 cc  Gallbladder cultures positive for Enterococcus faecalis, pansensitive.  DC IV Zosyn - switch to amoxicillin    3/27  Repeat CT A/P showed decreased gallbladder fossa fluid-filled collection s/p percutaneous drain placement.  Plan for follow-up CT in  5 to 7 days, tentatively 4/2/2025  Continue amoxicillin  On PO oxy and IV Dilaudid prn. Continue pulse oximetry monitoring to monitor for hypoxia and respiratory depression while receiving IV narcotic medications    Circulating anticoagulant disorder (HCC)  Assessment & Plan  On Eliquis at home for history of DVT.    Acute on chronic respiratory failure with hypoxia ([facility])  Assessment & Plan  Patient on 3 L at baseline at home.  Initially required 6 L at the admission  In the setting of possible CHF exacerbation.  S/p IV Lasix.  Switch to home p.o. Lasix dose, 40 mg once daily.    3/27  Stable on 3L.  Continue p.o. Lasix 40 mg once daily.      History of pulmonary embolism  Assessment & Plan  Home Eliquis on hold for possible procedure    ACP (advance care planning)  Assessment & Plan  Was discussed by previous provider, full code    SIRS (systemic inflammatory response syndrome) ([facility])  Assessment & Plan  SIRS criteria identified on my evaluation include:  Tachycardia, with heart rate greater than 90 BPM and Leukocytosis, with WBC greater than 12,000  Improved    DVT (deep venous thrombosis) ([facility])  Assessment & Plan  Continue on home Eliquis.  CTPA at outside hospital negative for pulmonary embolism    Acute on chronic systolic congestive heart failure (HCC)  Assessment & Plan  History of heart failure with ejection fraction 45%  Noted to have lower extremity swelling and small pleural effusion seen on CAT scan   Bnp 986 on admission.  S/p IV Lasix  Continue home p.o. Lasix.  Wean oxygen as tolerated back to baseline.           VTE prophylaxis: Eliquis    I have performed a physical exam and reviewed and updated ROS and Plan today (3/27/2025). In review of yesterday's note (3/26/2025), there are no changes except as documented above.

## 2025-03-27 NOTE — ASSESSMENT & PLAN NOTE
CT repeat 3/27 read with bilateral lower lobe atelectasis and/or consolidation, underlying infection is possible.  Procalcitonin WNL and Pt asymptomatic. Less suspicion for PNA. Continue to monitor.

## 2025-03-27 NOTE — PROGRESS NOTES
Report received from RN, assumed care at 0645  Pt is A0X4, and responds appropriately   Pt declines any SOB, chest pain, new onset of numbness/ tingling  Pt rates pain at 7/10, on a scale of 1-10, pt medicated per MAR  Pt is voiding adequately and without hesitancy  Pt has RUQ IR drain    Pt has + flatus, + bowel sounds, + BM on 3/26  Pt ambulates with a SB assist  Pt is tolerating a diet, pt denies any nausea/vomiting  Plan of care discussed, all questions answered. Explained importance of calling before getting OOB and pt verbalizes understanding. Explained importance of oral care. Call light is within reach, treaded slipper socks on, bed in lowest/ locked position, hourly rounding in place, all needs met at this time

## 2025-03-27 NOTE — PROGRESS NOTES
Radiology Progress Note   Author: SHAWNEE Cervantes Date & Time created: 3/27/2025  10:06 AM   Date of admission  3/21/2025  Note to reader: this note follows the APSO format rather than the historical SOAP format. Assessment and plan located at the top of the note for ease of use.    Chief Complaint  75 y.o. male admitted 3/21/2025 with abdominal pain      HPI  75-year-old male with past medical history significant for HFrEF, PE on Eliquis, morbid obesity, and recent cholecystitis s/p cholecystectomy (03/13/25) transferred from outside facility for CT finding of gallbladder fossa fluid collection after presenting with continued abdominal pain.  IR was consulted and patient underwent CT-guided gallbladder fossa fluid collection drain with IR Dr. Mooney on 03/22/2025. 80 mL old, cloudy, bloody fluid was drained at time of drain placement    Interval History:   03/23/2025 - GB fossa drain to RUQ with no output in the last 24 hours.  I flushed the drain with 10 mLs NS with moderate amount of maroon fluid returned in the suction bulb. Labs reviewed by me including WBC 16.1, H&H 12.4/37.5, creatinine 0.75.  GB fossa fluid cultures pending.  On ABX.  HIDA scan without evidence of biliary leak. Pt had many questions regarding plan of care and all questions answered to patient's satisfaction using  line.  I reviewed all IDT notes, reviewed all imaging, and discussed patient with bedside RN.    03/24/2025 - GB fossa drain to RUQ with 5 mL maroon output in the last 24 hours.  I flushed the drain with 10 mLs NS with instant return of moderate maroon output upon return to bulb suction. Labs reviewed by me including: WBC 13.8, H&H 12.5/39.3, creatinine 0.8. GB fossa fluid cultures positive for Enterococcus faecalis. On ABX. I reviewed all IDT notes, reviewed all imaging, and discussed patient with Hospitalist and hospital nursing staff.     03/25/2025 - GB fossa drain to RUQ with 30 mL dark maroon output  "in the last 24 hours. I flushed the drain with 10 mLs NS with instant return of moderate maroon output upon returning to bulb suction. Labs reviewed by me including: WBC 11.7, H&H 13.3/42.7, creatinine 0.78. GB fossa fluid cultures positive. On ABX. I reviewed all IDT notes and discussed patient care with Hospitalist.    03/26/2025 - GB fossa drain to RUQ with 30 mL dark maroon/brown output in the last 24 hours. I flushed the drain with 10 mLs NS with instant return of light maroon output upon returning to bulb suction. Labs reviewed by me including: WBC 11.9, H&H 12.9/42.3, creatinine 0.77. GB fossa fluid cultures positive. On ABX. I reviewed all IDT notes and discussed patient care with hospital nursing staff.    3/27/25 - CT abdomen/pelvis today shows: \"Decreased gallbladder fossa fluid collection status post percutaneous drain placement now measuring 3.9 x 5.7 cm, previously 10.2 x 6.8 cm\" GB fossa drain to RUQ with 33 mL dark maroon/brown output in the last 24 hours. I flushed the drain with 10 mLs NS with instant return of light maroon output upon returning to bulb suction. Labs reviewed by me including: WBC 11.5, H&H 13.3/42, creatinine 0.77. GB fossa fluid cultures positive. On ABX. I reviewed plan with patient at bedside.    Assessment/Plan     Principal Problem:    Gallbladder abscess  Active Problems:    Abdominal pain    Acute on chronic systolic congestive heart failure ([facility])    DVT (deep venous thrombosis) ([facility])    SIRS (systemic inflammatory response syndrome) ([facility])    ACP (advance care planning)    History of pulmonary embolism    SBO (small bowel obstruction) ([facility])    Acute on chronic respiratory failure with hypoxia ([facility])    Circulating anticoagulant disorder ([facility])      Plan IR  - Continue to irrigate GB fossa drain with 10 ml of sterile saline each shift  - GB fossa fluid cultures positive for Enterococcus faecalis  - Hospitalist Gen Surg, and Urology following   - Follow up CT scan in 5-7 days " (~4/2/25)  - Continue to monitor drain output  - IR will continue to follow.  -  -Thank you for allowing Interventional Radiology team to participate in the patients care, if any additional care or requests are needed in the future please do not hesitate to call or place IR order.        Review of Systems  Physical Exam   Review of Systems   Constitutional:  Negative for chills, fever and malaise/fatigue.   Respiratory:  Negative for shortness of breath.    Cardiovascular:  Negative for chest pain and palpitations.   Gastrointestinal:  Negative for abdominal pain, nausea and vomiting.   Neurological:  Negative for weakness and headaches.      Vitals:    03/27/25 0729   BP: 123/78   Pulse: 91   Resp: 17   Temp: 36.9 °C (98.4 °F)   SpO2: 95%        Physical Exam  Constitutional:       General: He is not in acute distress.  Cardiovascular:      Rate and Rhythm: Normal rate.      Pulses: Normal pulses.   Pulmonary:      Effort: Pulmonary effort is normal. No respiratory distress.   Abdominal:      Tenderness: There is abdominal tenderness (mild at drain insertion site).      Comments: IR drain to RUQ   Skin:     General: Skin is warm and dry.   Neurological:      General: No focal deficit present.      Mental Status: He is alert and oriented to person, place, and time.   Psychiatric:         Mood and Affect: Mood normal.         Behavior: Behavior normal.          Labs    Recent Labs     03/25/25  0818 03/26/25 0337 03/27/25  0039   WBC 11.7* 11.9* 11.5*   RBC 4.85 4.80 4.75   HEMOGLOBIN 13.3* 12.9* 13.3*   HEMATOCRIT 42.7 42.3 42.0   MCV 88.0 88.1 88.4   MCH 27.4 26.9* 28.0   MCHC 31.1* 30.5* 31.7*   RDW 48.5 47.6 48.1   PLATELETCT 516* 511* 501*   MPV 9.9 9.6 10.0     Recent Labs     03/25/25  0818 03/26/25  0337 03/27/25  0039   SODIUM 136 136 137   POTASSIUM 3.9 4.9 4.7   CHLORIDE 99 101 100   CO2 25 26 26   GLUCOSE 145* 101* 102*   BUN 17 16 17   CREATININE 0.78 0.77 0.77   CALCIUM 8.7 8.7 8.8     Recent Labs      "03/25/25  0818 03/26/25  0337 03/27/25  0039   CREATININE 0.78 0.77 0.77     CT-ABDOMEN-PELVIS WITH   Final Result      1.  Decreased gallbladder fossa fluid collection status post percutaneous drain placement now measuring 3.9 x 5.7 cm, previously 10.2 x 6.8 cm.   2.  Small right effusion, partially visualized.   3.  Bilateral lower lobe atelectasis and/or consolidation, underlying infection is possible.   4.  Unchanged trace perihepatic fluid adjacent to the dome of the liver.   5.  Status post cholecystectomy.   6.  Small fat-containing bilateral internal hernias.   7.  Enlarged prostate.   8.  Atherosclerosis.      DX-CHEST-PORTABLE (1 VIEW)   Final Result      Findings suggestive of pulmonary edema with elevation of the right hemidiaphragm and bibasilar atelectasis/infiltrate.      NM-HEPATOBILIARY SCAN   Final Result      No scintigraphy evidence of bile leak during the 60 minute imaging.      CT-IMAGE-GUIDED DRAIN PERITONEAL   Final Result      1.  CT GUIDED PLACEMENT OF A PERCUTANEOUS DRAINAGE CATHETER IN A GALLBLADDER FOSSA FLUID COLLECTION.   2.  THE CURRENT PLAN IS TO MONITOR DRAINAGE OUTPUT AND OBTAIN A FOLLOWUP CT SCAN IN 5-7 DAYS IF CLINICALLY INDICATED.        INR   Date Value Ref Range Status   03/21/2025 1.09 0.87 - 1.13 Final     Comment:     INR - Non-therapeutic Reference Range: 0.87-1.13  INR - Therapeutic Reference Range: 2.0-4.0       No results found for: \"POCINR\"     Intake/Output Summary (Last 24 hours) at 3/23/2025 0758  Last data filed at 3/23/2025 0714  Gross per 24 hour   Intake 0 ml   Output 0 ml   Net 0 ml      I have personally reviewed the above labs and imaging      I have performed a physical exam and reviewed and updated ROS and Plan today (3/27/2025).     35 minutes in directly providing and coordinating care and extensive data review.  No time overlap and excludes procedures.   "

## 2025-03-27 NOTE — PROGRESS NOTES
Received report from previous shift RN  Assessment complete.  A&O x 4 . Patient calls appropriately.  Patient ambulates with SBA  assist. Bed alarm on .  Patient has 0 /10 pain. Pt. Declines of pain intervention at this time.  Denies N&V. Tolerating cardiac diet.  Skin per flowsheets   + void, + flatus, +BM (3/26)   Patient denies SOB. Spo2>90% on 5L  SCD's refused.  Patient is resting in bed.  Reviewed plan of care with patient. Call light and personal belongings with in reach. Hourly rounding in place. All needs met at this time.

## 2025-03-28 PROCEDURE — 700102 HCHG RX REV CODE 250 W/ 637 OVERRIDE(OP): Performed by: STUDENT IN AN ORGANIZED HEALTH CARE EDUCATION/TRAINING PROGRAM

## 2025-03-28 PROCEDURE — A9270 NON-COVERED ITEM OR SERVICE: HCPCS | Performed by: STUDENT IN AN ORGANIZED HEALTH CARE EDUCATION/TRAINING PROGRAM

## 2025-03-28 PROCEDURE — A9270 NON-COVERED ITEM OR SERVICE: HCPCS

## 2025-03-28 PROCEDURE — 99233 SBSQ HOSP IP/OBS HIGH 50: CPT

## 2025-03-28 PROCEDURE — 700102 HCHG RX REV CODE 250 W/ 637 OVERRIDE(OP)

## 2025-03-28 PROCEDURE — 770001 HCHG ROOM/CARE - MED/SURG/GYN PRIV*

## 2025-03-28 RX ADMIN — AMOXICILLIN 1000 MG: 500 CAPSULE ORAL at 04:17

## 2025-03-28 RX ADMIN — LOSARTAN POTASSIUM 50 MG: 50 TABLET, FILM COATED ORAL at 04:17

## 2025-03-28 RX ADMIN — OXYCODONE 5 MG: 5 TABLET ORAL at 23:14

## 2025-03-28 RX ADMIN — CARVEDILOL 6.25 MG: 6.25 TABLET, FILM COATED ORAL at 06:39

## 2025-03-28 RX ADMIN — FUROSEMIDE 40 MG: 20 TABLET ORAL at 04:17

## 2025-03-28 RX ADMIN — AMOXICILLIN 1000 MG: 500 CAPSULE ORAL at 16:25

## 2025-03-28 RX ADMIN — APIXABAN 5 MG: 5 TABLET, FILM COATED ORAL at 16:24

## 2025-03-28 RX ADMIN — APIXABAN 5 MG: 5 TABLET, FILM COATED ORAL at 04:17

## 2025-03-28 ASSESSMENT — ENCOUNTER SYMPTOMS
ABDOMINAL PAIN: 0
PALPITATIONS: 0
VOMITING: 0
FEVER: 0
NAUSEA: 0
CHILLS: 0
HEADACHES: 0
SHORTNESS OF BREATH: 0
WEAKNESS: 0

## 2025-03-28 ASSESSMENT — PAIN DESCRIPTION - PAIN TYPE
TYPE: ACUTE PAIN
TYPE: ACUTE PAIN;SURGICAL PAIN
TYPE: ACUTE PAIN

## 2025-03-28 NOTE — PROGRESS NOTES
Virtual Nurse rounding complete.    Round Needs: Patient had questions regarding his drain & if the bedside RN could take a look at it. Notified primary RN. No other needs at this time.

## 2025-03-28 NOTE — DISCHARGE PLANNING
Case Management Discharge Planning    Admission Date: 3/21/2025  GMLOS: 4.4  ALOS: 7    6-Clicks ADL Score: 24  6-Clicks Mobility Score: 21      Anticipated Discharge Dispo: Discharge Disposition: Discharged to home/self care (01) with close OP follow up    DME Needed: No    Action(s) Taken: Updated Provider/Nurse on Discharge Plan    Pt was discussed in IDT rounds with Dr Maximiliano Baker.    Pt is on service with [employer] for home oxygen at 3 liters baseline.    Pt still has a drain.      This RN CM spoke with You, Pt s  Son . He is at work and is assiting his Dad with discharge plan.    Per jayro Morales to send referral to Home Health  companies contracted with Pt s insurance . Pt lives in [place] with  a Roommate.    Andrew and his Brother can provide support.  [facility] and [facility]. Choice was faxed to Shanna KENNEDY.     Pt has been accepted with [facility] .    This RN CM met with Pt and his Son Andrew at bedside.   Explained that Pt has been accepted with [facility] .    Pt s [facility] docs submitted to [facility] portal.       Escalations Completed: None    Medically Clear: No    Next Steps:   CM to continue to assist Pt with discharge as needed    Barriers to Discharge:   Medical clearance      Is the patient up for discharge tomorrow: No

## 2025-03-28 NOTE — PROGRESS NOTES
Bedside report received from Anu OTOOLE. Assumed care of patient at 1845.  Assessment complete.    Patient A&O x 4. Patient Czech speaking, patient calling appropriately.  Mobility: Patient ambulates with SB assist.   Fall Risk Assessment: Low fall risk per Pinedo Rg score. Bed alarm on.   Pain: Patient reports minimal pain to abdomen, managed with rest at this time. Patient educated on pain rating scale, PRN medications for pain management, and nonpharmacologic measures for pain control.   Diet: Tolerating cardiac diet. Denies nausea/vomiting.   LDA:    Access: 20RFA, dressing CDI, saline locked   Drains: RUQ KELLY drain with percustay dressing, CDI   Surgical incisions: x4 lap sites with dermabond CDI  Viet Score: Minimal risk for skin breakdown. Interventions: Patient repositions self frequently.   GI/:   + void, adequate clear/yellow UO  + flatus  Last BM +3/27 per patient report  DVT Prophylaxis: Eliquis. SCD's refused, educated on purpose.   Respiratory: Patient SaO2 >90% on 3L NC (baseline since November per pt), denies SOB. IS at bedside.     Reviewed plan of care with patient. Call light and personal belongings within reach. Hourly rounding in place. All needs met at this time.

## 2025-03-28 NOTE — PROGRESS NOTES
Radiology Progress Note   Author: SHAWNEE Cervantes Date & Time created: 3/28/2025  12:31 PM   Date of admission  3/21/2025  Note to reader: this note follows the APSO format rather than the historical SOAP format. Assessment and plan located at the top of the note for ease of use.    Chief Complaint  75 y.o. male admitted 3/21/2025 with abdominal pain      HPI  75-year-old male with past medical history significant for HFrEF, PE on Eliquis, morbid obesity, and recent cholecystitis s/p cholecystectomy (03/13/25) transferred from outside facility for CT finding of gallbladder fossa fluid collection after presenting with continued abdominal pain.  IR was consulted and patient underwent CT-guided gallbladder fossa fluid collection drain with IR Dr. Mooney on 03/22/2025. 80 mL old, cloudy, bloody fluid was drained at time of drain placement    Interval History:   03/23/2025 - GB fossa drain to RUQ with no output in the last 24 hours.  I flushed the drain with 10 mLs NS with moderate amount of maroon fluid returned in the suction bulb. Labs reviewed by me including WBC 16.1, H&H 12.4/37.5, creatinine 0.75.  GB fossa fluid cultures pending.  On ABX.  HIDA scan without evidence of biliary leak. Pt had many questions regarding plan of care and all questions answered to patient's satisfaction using  line.  I reviewed all IDT notes, reviewed all imaging, and discussed patient with bedside RN.    03/24/2025 - GB fossa drain to RUQ with 5 mL maroon output in the last 24 hours.  I flushed the drain with 10 mLs NS with instant return of moderate maroon output upon return to bulb suction. Labs reviewed by me including: WBC 13.8, H&H 12.5/39.3, creatinine 0.8. GB fossa fluid cultures positive for Enterococcus faecalis. On ABX. I reviewed all IDT notes, reviewed all imaging, and discussed patient with Hospitalist and hospital nursing staff.     03/25/2025 - GB fossa drain to RUQ with 30 mL dark maroon output  "in the last 24 hours. I flushed the drain with 10 mLs NS with instant return of moderate maroon output upon returning to bulb suction. Labs reviewed by me including: WBC 11.7, H&H 13.3/42.7, creatinine 0.78. GB fossa fluid cultures positive. On ABX. I reviewed all IDT notes and discussed patient care with Hospitalist.    03/26/2025 - GB fossa drain to RUQ with 30 mL dark maroon/brown output in the last 24 hours. I flushed the drain with 10 mLs NS with instant return of light maroon output upon returning to bulb suction. Labs reviewed by me including: WBC 11.9, H&H 12.9/42.3, creatinine 0.77. GB fossa fluid cultures positive. On ABX. I reviewed all IDT notes and discussed patient care with hospital nursing staff.    3/27/25 - CT abdomen/pelvis today shows: \"Decreased gallbladder fossa fluid collection status post percutaneous drain placement now measuring 3.9 x 5.7 cm, previously 10.2 x 6.8 cm\" GB fossa drain to RUQ with 33 mL dark maroon/brown output in the last 24 hours. I flushed the drain with 10 mLs NS with instant return of light maroon output upon returning to bulb suction. Labs reviewed by me including: WBC 11.5, H&H 13.3/42, creatinine 0.77. GB fossa fluid cultures positive. On ABX. I reviewed plan with patient at bedside.    3/28/25 - GB fossa drain to RUQ with 18 mL dark maroon/brown output in the last 24 hours. I flushed the drain with 10 mLs NS with instant return of maroon output upon returning to bulb suction. No new labs to review today. GB fossa fluid cultures positive. On ABX. I reviewed plan with patient at bedside and reviewed IDT notes. I discussed plan of care with Hospitalist.    Assessment/Plan     Principal Problem:    Gallbladder abscess  Active Problems:    Abdominal pain    Acute on chronic systolic congestive heart failure (HCC)    DVT (deep venous thrombosis) (HCC)    SIRS (systemic inflammatory response syndrome) (HCC)    ACP (advance care planning)    History of pulmonary embolism    " SBO (small bowel obstruction) (HCC)    Acute on chronic respiratory failure with hypoxia (HCC)    Circulating anticoagulant disorder (HCC)    Enlarged prostate    Abnormal finding on lung imaging    S/P cholecystectomy    Bilateral inguinal hernia without obstruction or gangrene      Plan IR  - Continue to irrigate GB fossa drain with 10 ml of sterile saline each shift  - GB fossa fluid cultures positive for Enterococcus faecalis  - Hospitalist Gen Surg, and Urology following   - Follow up CT scan in 5-7 days (~4/2/25)  - Continue to monitor drain output  - IR will continue to follow.  -  -Thank you for allowing Interventional Radiology team to participate in the patients care, if any additional care or requests are needed in the future please do not hesitate to call or place IR order.        Review of Systems  Physical Exam   Review of Systems   Constitutional:  Negative for chills, fever and malaise/fatigue.   Respiratory:  Negative for shortness of breath.    Cardiovascular:  Negative for chest pain and palpitations.   Gastrointestinal:  Negative for abdominal pain, nausea and vomiting.   Neurological:  Negative for weakness and headaches.      Vitals:    03/28/25 0737   BP: 101/61   Pulse: 81   Resp: 16   Temp: 36.6 °C (97.9 °F)   SpO2: 93%        Physical Exam  Constitutional:       General: He is not in acute distress.  Cardiovascular:      Rate and Rhythm: Normal rate.      Pulses: Normal pulses.   Pulmonary:      Effort: Pulmonary effort is normal. No respiratory distress.   Abdominal:      Tenderness: There is abdominal tenderness (mild at drain insertion site).      Comments: IR drain to RUQ   Skin:     General: Skin is warm and dry.   Neurological:      General: No focal deficit present.      Mental Status: He is alert and oriented to person, place, and time.   Psychiatric:         Mood and Affect: Mood normal.         Behavior: Behavior normal.          Labs    Recent Labs     03/26/25  0851  "03/27/25 0039   WBC 11.9* 11.5*   RBC 4.80 4.75   HEMOGLOBIN 12.9* 13.3*   HEMATOCRIT 42.3 42.0   MCV 88.1 88.4   MCH 26.9* 28.0   MCHC 30.5* 31.7*   RDW 47.6 48.1   PLATELETCT 511* 501*   MPV 9.6 10.0     Recent Labs     03/26/25 0337 03/27/25 0039   SODIUM 136 137   POTASSIUM 4.9 4.7   CHLORIDE 101 100   CO2 26 26   GLUCOSE 101* 102*   BUN 16 17   CREATININE 0.77 0.77   CALCIUM 8.7 8.8     Recent Labs     03/26/25 0337 03/27/25 0039   CREATININE 0.77 0.77     CT-ABDOMEN-PELVIS WITH   Final Result      1.  Decreased gallbladder fossa fluid collection status post percutaneous drain placement now measuring 3.9 x 5.7 cm, previously 10.2 x 6.8 cm.   2.  Small right effusion, partially visualized.   3.  Bilateral lower lobe atelectasis and/or consolidation, underlying infection is possible.   4.  Unchanged trace perihepatic fluid adjacent to the dome of the liver.   5.  Status post cholecystectomy.   6.  Small fat-containing bilateral internal hernias.   7.  Enlarged prostate.   8.  Atherosclerosis.      DX-CHEST-PORTABLE (1 VIEW)   Final Result      Findings suggestive of pulmonary edema with elevation of the right hemidiaphragm and bibasilar atelectasis/infiltrate.      NM-HEPATOBILIARY SCAN   Final Result      No scintigraphy evidence of bile leak during the 60 minute imaging.      CT-IMAGE-GUIDED DRAIN PERITONEAL   Final Result      1.  CT GUIDED PLACEMENT OF A PERCUTANEOUS DRAINAGE CATHETER IN A GALLBLADDER FOSSA FLUID COLLECTION.   2.  THE CURRENT PLAN IS TO MONITOR DRAINAGE OUTPUT AND OBTAIN A FOLLOWUP CT SCAN IN 5-7 DAYS IF CLINICALLY INDICATED.        INR   Date Value Ref Range Status   03/21/2025 1.09 0.87 - 1.13 Final     Comment:     INR - Non-therapeutic Reference Range: 0.87-1.13  INR - Therapeutic Reference Range: 2.0-4.0       No results found for: \"POCINR\"     Intake/Output Summary (Last 24 hours) at 3/23/2025 7372  Last data filed at 3/23/2025 0782  Gross per 24 hour   Intake 0 ml   Output 0 ml "   Net 0 ml      I have personally reviewed the above labs and imaging      I have performed a physical exam and reviewed and updated ROS and Plan today (3/28/2025).     35 minutes in directly providing and coordinating care and extensive data review.  No time overlap and excludes procedures.

## 2025-03-28 NOTE — CARE PLAN
The patient is Stable - Low risk of patient condition declining or worsening    Shift Goals  Clinical Goals: IR drain management, pain control  Patient Goals: pain control  Family Goals: APOLINAR    Progress made toward(s) clinical / shift goals:  Patient's pain to drain site controlled with PRN medications, heat packs, and rest. Patient able to rest comfortably throughout the night and able to perform ADLs. IR drain to bulb suction. Percustay dressing in place CDI. Scant dark red output at time of assessment. About 18 mL of output so far this shift. Q4hr I/Os for drains. Patient scoring as a moderate fall risk d/t scheduled and PRN medications. Patient intermittently calling for assistance to toilet. Educated patient on fall and risk and bed alarm turned on.     Problem: Knowledge Deficit - Standard  Goal: Patient and family/care givers will demonstrate understanding of plan of care, disease process/condition, diagnostic tests and medications  Description: Target End Date:  1-3 days or as soon as patient condition allowsDocument in Patient Education1.  Patient and family/caregiver oriented to unit, equipment, visitation policy and means for communicating concern2.  Complete/review Learning Assessment3.  Assess knowledge level of disease process/condition, treatment plan, diagnostic tests and medications4.  Explain disease process/condition, treatment plan, diagnostic tests and medications  Outcome: Progressing     Problem: Respiratory  Goal: Patient will achieve/maintain optimum respiratory ventilation and gas exchange  Description: Target End Date:  Prior to discharge or change in level of careDocument on Assessment flowsheet1.  Assess and monitor rate, rhythm, depth and effort of respiration2.  Breath sounds assessed qshift and/or as needed3.  Assess O2 saturation, administer/titrate oxygen as ordered4.  Position patient for maximum ventilatory efficiency5.  Turn, cough, and deep breath with splinting to improve  effectiveness6.  Collaborate with RT to administer medication/treatments per order7.  Encourage use of incentive spirometer and encourage patient to cough after use and utilize splinting techniques if applicable8.  Airway suctioning9.  Monitor sputum production for changes in color, consistency and [id number]. Perform frequent oral [id number]. Alternate physical activity with rest periods  Outcome: Progressing     Problem: Physical Regulation  Goal: Diagnostic test results will improve  Description: Target End Date:  Prior to discharge or change in level of care1.  Monitor lactic acid levels2.  Monitor ABG's3.  Monitor diagnostic test results  Outcome: Progressing  Goal: Signs and symptoms of infection will decrease  Description: Target End Date:  Prior to discharge or change in level of care1.  Remove potential routes of infection, such as central lines and urinary catheter2.  Follow facility protocol for changing IV tubing and sites3.  Collaborate with Infectious Disease4.  Antibiotic therapy per provider order5.  Note drug effects and monitor for antibiotic toxicity  Outcome: Progressing     Problem: Pain - Standard  Goal: Alleviation of pain or a reduction in pain to the patient’s comfort goal  Description: Target End Date:  Prior to discharge or change in level of careDocument on Vitals flowsheet1.  Document pain using the appropriate pain scale per order or unit policy2.  Educate and implement non-pharmacologic comfort measures (i.e. relaxation, distraction, massage, cold/heat therapy, etc.)3.  Pain management medications as ordered4.  Reassess pain after pain med administration per policy5.  If opiods administered assess patient's response to pain medication is appropriate per POSS sedation scale6.  Follow pain management plan developed in collaboration with patient and interdisciplinary team (including palliative care or pain specialists if applicable)  Outcome: Progressing     Problem: Fall Risk  Goal:  Patient will remain free from falls  Description: Target End Date:  Prior to discharge or change in level of careDocument interventions on the [facility] Fall Risk Assessment1.  Assess for fall risk factors2.  Implement fall precautions  Outcome: Progressing     Problem: Mobility  Goal: Patient's capacity to carry out activities will improve  Description: Target End Date:  Prior to discharge or change in level of care1.  Assess for barriers to mobility/activity2.  Implement activity per interdisciplinary team recommendations3.  Target activity level identified and patient/family/caregiver aware of goal4.  Provide assistive devices5.  Instruct patient/caregiver on proper use of assistive/adaptive devices6.  Schedule activities and rest periods to decrease effects of fatigue7.  Encourage mobilization to extent of ability8.  Maintain proper body alignment9.  Provide adequate pain management to allow progressive [id number]. Implement pace maker precautions as needed  Outcome: Progressing       Patient is not progressing towards the following goals:

## 2025-03-28 NOTE — DISCHARGE PLANNING
HH referral sent to [facility].     [id number]  HH referral sent to [facility].

## 2025-03-28 NOTE — CARE PLAN
The patient is Stable - Low risk of patient condition declining or worsening    Shift Goals  Clinical Goals: IR drain, pain management, mobility, safety  Patient Goals: Pain management  Family Goals: Not present    Progress made toward(s) clinical / shift goals:    Problem: Knowledge Deficit - Standard  Goal: Patient and family/care givers will demonstrate understanding of plan of care, disease process/condition, diagnostic tests and medications  Outcome: Progressing     Problem: Respiratory  Goal: Patient will achieve/maintain optimum respiratory ventilation and gas exchange  Outcome: Progressing     Problem: Physical Regulation  Goal: Diagnostic test results will improve  Outcome: Progressing  Goal: Signs and symptoms of infection will decrease  Outcome: Progressing     Problem: Pain - Standard  Goal: Alleviation of pain or a reduction in pain to the patient’s comfort goal  Outcome: Progressing     Problem: Fall Risk  Goal: Patient will remain free from falls  Outcome: Progressing     Problem: Mobility  Goal: Patient's capacity to carry out activities will improve  Outcome: Progressing       Patient is not progressing towards the following goals:

## 2025-03-28 NOTE — CARE PLAN
The patient is Stable - Low risk of patient condition declining or worsening    Shift Goals  Clinical Goals: IR drain management, pain control  Patient Goals: Pain control, mobility  Family Goals: APOLINAR    Progress made toward(s) clinical / shift goals:      Problem: Knowledge Deficit - Standard  Goal: Patient and family/care givers will demonstrate understanding of plan of care, disease process/condition, diagnostic tests and medications  Outcome: Progressing     Problem: Respiratory  Goal: Patient will achieve/maintain optimum respiratory ventilation and gas exchange  Outcome: Progressing     Problem: Physical Regulation  Goal: Signs and symptoms of infection will decrease  Outcome: Progressing     Problem: Pain - Standard  Goal: Alleviation of pain or a reduction in pain to the patient’s comfort goal  Outcome: Progressing     Problem: Fall Risk  Goal: Patient will remain free from falls  Outcome: Progressing     Problem: Mobility  Goal: Patient's capacity to carry out activities will improve  Outcome: Progressing

## 2025-03-28 NOTE — PROGRESS NOTES
"    No acute changes  Tolerating diet  Follow-up CT with decreased fluid collection  Drain bulb not holding suction    /61   Pulse 81   Temp 36.6 °C (97.9 °F) (Temporal)   Resp 16   Ht 1.676 m (5' 6\")   Wt 115 kg (254 lb 3.1 oz)   SpO2 93%   BMI 41.03 kg/m²     No distress  No jaundice or icterus  Abdomen soft and nontender  Drain turbid bilious  KELLY bulb missing suction port plug    Assessment and plan:  Gallbladder fossa abscess after cholecystectomy, clinically improving  New KELLY bulb  Follow-up CT Tuesday  Discharge planning and drain teaching  "

## 2025-03-29 LAB
ANION GAP SERPL CALC-SCNC: 10 MMOL/L (ref 7–16)
BUN SERPL-MCNC: 17 MG/DL (ref 8–22)
CALCIUM SERPL-MCNC: 8.9 MG/DL (ref 8.5–10.5)
CHLORIDE SERPL-SCNC: 98 MMOL/L (ref 96–112)
CO2 SERPL-SCNC: 29 MMOL/L (ref 20–33)
CREAT SERPL-MCNC: 0.76 MG/DL (ref 0.5–1.4)
ERYTHROCYTE [DISTWIDTH] IN BLOOD BY AUTOMATED COUNT: 46.9 FL (ref 35.9–50)
GFR SERPLBLD CREATININE-BSD FMLA CKD-EPI: 94 ML/MIN/1.73 M 2
GLUCOSE SERPL-MCNC: 98 MG/DL (ref 65–99)
HCT VFR BLD AUTO: 42.5 % (ref 42–52)
HGB BLD-MCNC: 13.5 G/DL (ref 14–18)
MAGNESIUM SERPL-MCNC: 2.3 MG/DL (ref 1.5–2.5)
MCH RBC QN AUTO: 27.3 PG (ref 27–33)
MCHC RBC AUTO-ENTMCNC: 31.8 G/DL (ref 32.3–36.5)
MCV RBC AUTO: 86 FL (ref 81.4–97.8)
PHOSPHATE SERPL-MCNC: 3.5 MG/DL (ref 2.5–4.5)
PLATELET # BLD AUTO: 463 K/UL (ref 164–446)
PMV BLD AUTO: 9.7 FL (ref 9–12.9)
POTASSIUM SERPL-SCNC: 4.5 MMOL/L (ref 3.6–5.5)
RBC # BLD AUTO: 4.94 M/UL (ref 4.7–6.1)
SODIUM SERPL-SCNC: 137 MMOL/L (ref 135–145)
WBC # BLD AUTO: 9 K/UL (ref 4.8–10.8)

## 2025-03-29 PROCEDURE — 99233 SBSQ HOSP IP/OBS HIGH 50: CPT

## 2025-03-29 PROCEDURE — 36415 COLL VENOUS BLD VENIPUNCTURE: CPT

## 2025-03-29 PROCEDURE — A9270 NON-COVERED ITEM OR SERVICE: HCPCS | Performed by: STUDENT IN AN ORGANIZED HEALTH CARE EDUCATION/TRAINING PROGRAM

## 2025-03-29 PROCEDURE — 700102 HCHG RX REV CODE 250 W/ 637 OVERRIDE(OP): Performed by: STUDENT IN AN ORGANIZED HEALTH CARE EDUCATION/TRAINING PROGRAM

## 2025-03-29 PROCEDURE — 83735 ASSAY OF MAGNESIUM: CPT

## 2025-03-29 PROCEDURE — A9270 NON-COVERED ITEM OR SERVICE: HCPCS

## 2025-03-29 PROCEDURE — 700102 HCHG RX REV CODE 250 W/ 637 OVERRIDE(OP)

## 2025-03-29 PROCEDURE — 84100 ASSAY OF PHOSPHORUS: CPT

## 2025-03-29 PROCEDURE — 85027 COMPLETE CBC AUTOMATED: CPT

## 2025-03-29 PROCEDURE — 770001 HCHG ROOM/CARE - MED/SURG/GYN PRIV*

## 2025-03-29 PROCEDURE — 80048 BASIC METABOLIC PNL TOTAL CA: CPT

## 2025-03-29 RX ADMIN — CARVEDILOL 6.25 MG: 6.25 TABLET, FILM COATED ORAL at 08:32

## 2025-03-29 RX ADMIN — OXYCODONE 5 MG: 5 TABLET ORAL at 23:16

## 2025-03-29 RX ADMIN — AMOXICILLIN 1000 MG: 500 CAPSULE ORAL at 04:19

## 2025-03-29 RX ADMIN — AMOXICILLIN 1000 MG: 500 CAPSULE ORAL at 18:01

## 2025-03-29 RX ADMIN — OXYCODONE 5 MG: 5 TABLET ORAL at 19:37

## 2025-03-29 RX ADMIN — FUROSEMIDE 40 MG: 20 TABLET ORAL at 04:19

## 2025-03-29 RX ADMIN — LOSARTAN POTASSIUM 50 MG: 50 TABLET, FILM COATED ORAL at 04:21

## 2025-03-29 RX ADMIN — APIXABAN 5 MG: 5 TABLET, FILM COATED ORAL at 04:19

## 2025-03-29 RX ADMIN — APIXABAN 5 MG: 5 TABLET, FILM COATED ORAL at 18:01

## 2025-03-29 ASSESSMENT — ENCOUNTER SYMPTOMS
WEAKNESS: 0
CHILLS: 0
ABDOMINAL PAIN: 0
PALPITATIONS: 0
SHORTNESS OF BREATH: 0
HEADACHES: 0
FEVER: 0
VOMITING: 0
NAUSEA: 0

## 2025-03-29 ASSESSMENT — PAIN DESCRIPTION - PAIN TYPE
TYPE: ACUTE PAIN

## 2025-03-29 NOTE — PROGRESS NOTES
Hospital Medicine Daily Progress Note    Date of Service  3/29/2025    Chief Complaint  Eduardo Cuellar is a 75 y.o. male admitted 3/21/2025 with surgical site fluid collection    Hospital Course    75-year-old male with morbid obesity, HFrEF, PE on Eliquis and recent laparoscopic cholecystectomy C/B cholecystitis 3/13/2025 who presented from [facility] ED with abdominal pain and SOB.    At the outside facility ED, imaging showed a 10 cm fluid collection within the gallbladder fossa favored to represent a postoperative seroma in the absence of clinical signs of infection.  The morphology of the gas within the fossa collection raises the possibility of retained hemostatic material.  There is small right pleural effusion with subsegmental passive atelectasis of the right lung base.  Patient was recommended IR drain and HIDA scan for evaluation of bile leak.  They had no IR services all weekend at [facility] therefore transfer was requested.    [facility] CT-guided drain placement 3/22/2024 with IR.  He was started on IV Zosyn.  HIDA scan showed no evidence of bile leak.  His gallbladder fossa fluid cultures were positive for Enterococcus faecalis.  He was transitioned to amoxicillin.  The repeat CAT scan showed decreased gallbladder fossa fluid-filled collection s/p percutaneous drain placement.  Per discussion with IR, recommended to keep the drain and have a follow-up CAT scan inpatient in 5 to 7 days, tentatively 4/2/2025 versus discharge with close follow-up with surgery. Discussed with surgery, Dr. Fuentes -patient can follow-up with them for the drain.  Advised patient to call their office the day after discharge for this to be scheduled.     Interval Problem Update    3/29  JAYDA.  HDS, on 4L overnight.  10 cc drain output overnight.  Voiding and stooling.  Patient denies any new complaints.  Continue on current regimen.    Plan for repeat CAT scan/2/2025.    I have discussed  this patient's plan of care and discharge plan at IDT rounds today with Case Management, Nursing, Nursing leadership, and other members of the IDT team.    Consultants/Specialty  General surgery    Code Status  Full Code    Disposition    I have placed the appropriate orders for post-discharge needs.    Review of Systems   All 12 systems were reviewed and negative except as mentioned above      Physical Exam  Temp:  [36.3 °C (97.3 °F)-37 °C (98.6 °F)] 36.3 °C (97.3 °F)  Pulse:  [66-82] 79  Resp:  [16-17] 16  BP: ()/(54-74) 106/68  SpO2:  [92 %-95 %] 92 %    Physical Exam  Constitutional:       General: He is not in acute distress.     Appearance: He is ill-appearing.   Cardiovascular:      Rate and Rhythm: Normal rate and regular rhythm.      Pulses: Normal pulses.      Heart sounds: Normal heart sounds.   Pulmonary:      Effort: No respiratory distress.      Breath sounds: No wheezing.   Abdominal:      General: Bowel sounds are normal.      Palpations: Abdomen is soft.      Tenderness: There is abdominal tenderness. There is no guarding.      Comments: KELLY drain in place.  Mild abdominal pain.   Musculoskeletal:         General: No swelling or tenderness.      Cervical back: Normal range of motion and neck supple.      Right lower leg: No edema.      Left lower leg: No edema.   Skin:     General: Skin is warm.   Neurological:      Mental Status: He is alert and oriented to person, place, and time. Mental status is at baseline.         Fluids    Intake/Output Summary (Last 24 hours) at 3/29/2025 1428  Last data filed at 3/29/2025 0759  Gross per 24 hour   Intake 200 ml   Output 10 ml   Net 190 ml        Laboratory  Recent Labs     03/27/25  0039 03/29/25  0954   WBC 11.5* 9.0   RBC 4.75 4.94   HEMOGLOBIN 13.3* 13.5*   HEMATOCRIT 42.0 42.5   MCV 88.4 86.0   MCH 28.0 27.3   MCHC 31.7* 31.8*   RDW 48.1 46.9   PLATELETCT 501* 463*   MPV 10.0 9.7     Recent Labs     03/27/25  0039 03/29/25  0954   SODIUM 137 137    POTASSIUM 4.7 4.5   CHLORIDE 100 98   CO2 26 29   GLUCOSE 102* 98   BUN 17 17   CREATININE 0.77 0.76   CALCIUM 8.8 8.9                     Imaging  CT-ABDOMEN-PELVIS WITH   Final Result      1.  Decreased gallbladder fossa fluid collection status post percutaneous drain placement now measuring 3.9 x 5.7 cm, previously 10.2 x 6.8 cm.   2.  Small right effusion, partially visualized.   3.  Bilateral lower lobe atelectasis and/or consolidation, underlying infection is possible.   4.  Unchanged trace perihepatic fluid adjacent to the dome of the liver.   5.  Status post cholecystectomy.   6.  Small fat-containing bilateral internal hernias.   7.  Enlarged prostate.   8.  Atherosclerosis.      DX-CHEST-PORTABLE (1 VIEW)   Final Result      Findings suggestive of pulmonary edema with elevation of the right hemidiaphragm and bibasilar atelectasis/infiltrate.      NM-HEPATOBILIARY SCAN   Final Result      No scintigraphy evidence of bile leak during the 60 minute imaging.      CT-IMAGE-GUIDED DRAIN PERITONEAL   Final Result      1.  CT GUIDED PLACEMENT OF A PERCUTANEOUS DRAINAGE CATHETER IN A GALLBLADDER FOSSA FLUID COLLECTION.   2.  THE CURRENT PLAN IS TO MONITOR DRAINAGE OUTPUT AND OBTAIN A FOLLOWUP CT SCAN IN 5-7 DAYS IF CLINICALLY INDICATED.           Assessment/Plan  * Gallbladder abscess- (present on admission)  Assessment & Plan  Patient noted to have a 10 cm fluid collection at the gallbladder fossa  Recent cholecystitis status post cholecystectomy  General surgery was consulted, recommends evaluation for bile leak, IR consult for drainage of fluid.    S/p HIDA 3/22, negative for bile leak.  S/p  IR drain 3/22, output of 5 cc  Gallbladder cultures positive for Enterococcus faecalis, pansensitive.  DC IV Zosyn - switch to amoxicillin    3/27  Repeat CT A/P showed decreased gallbladder fossa fluid-filled collection s/p percutaneous drain placement.    3/29  Plan for CAT scan in 5 to 7 days repeat, tentatively  4/2/2025  Continue amoxicillin.  On IV Dilaudid and p.o. oxy as needed. Continue pulse oximetry monitoring to monitor for hypoxia and respiratory depression while receiving IV narcotic medications       Bilateral inguinal hernia without obstruction or gangrene  Assessment & Plan  Seen on imaging CT.     S/P cholecystectomy  Assessment & Plan  Per history     Abnormal finding on lung imaging  Assessment & Plan  CT repeat 3/27 read with bilateral lower lobe atelectasis and/or consolidation, underlying infection is possible.  Procalcitonin WNL and Pt asymptomatic. Less suspicion for PNA. Continue to monitor.     Enlarged prostate  Assessment & Plan  Seen on imaging    Circulating anticoagulant disorder (HCC)  Assessment & Plan  On Eliquis at home for history of DVT.    Acute on chronic respiratory failure with hypoxia (HCC)  Assessment & Plan  Patient on 3 L at baseline at home.  Initially required 6 L at the admission  In the setting of possible CHF exacerbation.  S/p IV Lasix.  Switch to home p.o. Lasix dose, 40 mg once daily.    3/29  Stable on 3L.  Continue p.o. Lasix 40 mg once daily.      History of pulmonary embolism  Assessment & Plan  Continue home Eliquis.    ACP (advance care planning)  Assessment & Plan  Was discussed by previous provider, full code    SIRS (systemic inflammatory response syndrome) ([facility])  Assessment & Plan  SIRS criteria identified on my evaluation include:  Tachycardia, with heart rate greater than 90 BPM and Leukocytosis, with WBC greater than 12,000  Improved    DVT (deep venous thrombosis) ([facility])  Assessment & Plan  Continue on home Eliquis.  CTPA at outside hospital negative for pulmonary embolism    Acute on chronic systolic congestive heart failure (HCC)  Assessment & Plan  History of heart failure with ejection fraction 45%  Noted to have lower extremity swelling and small pleural effusion seen on CAT scan   Bnp 986 on admission.  S/p IV Lasix  Continue home p.o. Lasix.  Back to  baseline home 3L NC.         VTE prophylaxis: Eliquis    I have performed a physical exam and reviewed and updated ROS and Plan today (3/29/2025). In review of yesterday's note (3/28/2025), there are no changes except as documented above.

## 2025-03-29 NOTE — CARE PLAN
Problem: Knowledge Deficit - Standard  Goal: Patient and family/care givers will demonstrate understanding of plan of care, disease process/condition, diagnostic tests and medications  Outcome: Progressing     Problem: Respiratory  Goal: Patient will achieve/maintain optimum respiratory ventilation and gas exchange  Outcome: Progressing     Problem: Physical Regulation  Goal: Diagnostic test results will improve  Outcome: Progressing  Goal: Signs and symptoms of infection will decrease  Outcome: Progressing     Problem: Pain - Standard  Goal: Alleviation of pain or a reduction in pain to the patient’s comfort goal  Outcome: Progressing     Problem: Fall Risk  Goal: Patient will remain free from falls  Outcome: Progressing     Problem: Mobility  Goal: Patient's capacity to carry out activities will improve  Outcome: Progressing   The patient is Stable - Low risk of patient condition declining or worsening    Shift Goals  Clinical Goals: IR drain, pain management, rest  Patient Goals: Rest  Family Goals: APOLINAR    Progress made toward(s) clinical / shift goals:  Patient medicated per MAR for pain, IR drain CDI, DIP, with dark brown blood output. Flushed per orders. Patient slept throughout the night.     Patient is not progressing towards the following goals:

## 2025-03-29 NOTE — PROGRESS NOTES
Hospital Medicine Daily Progress Note    Date of Service  3/28/2025    Chief Complaint  Eduardo Cuelalr is a 75 y.o. male admitted 3/21/2025 with surgical site fluid collection    Hospital Course    75-year-old male with morbid obesity, HFrEF, PE on Eliquis and recent laparoscopic cholecystectomy C/B cholecystitis 3/13/2025 who presented from [facility] ED with abdominal pain and SOB.    At the outside facility ED, imaging showed a 10 cm fluid collection within the gallbladder fossa favored to represent a postoperative seroma in the absence of clinical signs of infection.  The morphology of the gas within the fossa collection raises the possibility of retained hemostatic material.  There is small right pleural effusion with subsegmental passive atelectasis of the right lung base.  Patient was recommended IR drain and HIDA scan for evaluation of bile leak.  They had no IR services all weekend at [facility] therefore transfer was requested.    [facility] CT-guided drain placement 3/22/2024 with IR.  He was started on IV Zosyn.  HIDA scan showed no evidence of bile leak.  His gallbladder fossa fluid cultures were positive for Enterococcus faecalis.  He was transitioned to amoxicillin.  The repeat CAT scan showed decreased gallbladder fossa fluid-filled collection s/p percutaneous drain placement.  Per discussion with IR, recommended to keep the drain and have a follow-up CAT scan inpatient in 5 to 7 days, tentatively 4/2/2025 versus discharge with close follow-up with surgery. Discussed with surgery, Dr. Fuentes -patient can follow-up with them for the drain.  Advised patient to call their office the day after discharge for this to be scheduled.     Interval Problem Update    3/28  MARY WILLIAMSON, on 3.5L.  18 cc drain output overnight.  Patient denies any new complaints.  Discussed with general surgery and IR -patient can either stay for a repeat CAT scan on 4/2/2025 or he is also okay to  discharge with a drain and close follow-up to surgical clinic.  Discussed with patient and son regarding options for plan of care -they stated they would prefer to stay inpatient and have that repeat CAT scan on 4/2/2025.  Continue amoxicillin  Plan for repeat CAT scan/2/2025 as scheduled above.  Discussed with general surgery and IR.    I have discussed this patient's plan of care and discharge plan at IDT rounds today with Case Management, Nursing, Nursing leadership, and other members of the IDT team.    Consultants/Specialty  General surgery    Code Status  Full Code    Disposition    I have placed the appropriate orders for post-discharge needs.    Review of Systems   All 12 systems were reviewed and negative except as mentioned above      Physical Exam  Temp:  [36.6 °C (97.9 °F)-37.1 °C (98.8 °F)] 36.9 °C (98.4 °F)  Pulse:  [66-81] 68  Resp:  [16] 16  BP: ()/(53-66) 100/61  SpO2:  [91 %-93 %] 92 %    Physical Exam  Constitutional:       General: He is not in acute distress.     Appearance: He is ill-appearing.   Cardiovascular:      Rate and Rhythm: Normal rate and regular rhythm.      Pulses: Normal pulses.      Heart sounds: Normal heart sounds.   Pulmonary:      Effort: No respiratory distress.      Breath sounds: No wheezing.   Abdominal:      General: Bowel sounds are normal.      Palpations: Abdomen is soft.      Tenderness: There is abdominal tenderness. There is no guarding.      Comments: KELLY drain in place.  Mild abdominal pain.   Musculoskeletal:         General: No swelling or tenderness.      Cervical back: Normal range of motion and neck supple.      Right lower leg: No edema.      Left lower leg: No edema.   Skin:     General: Skin is warm.   Neurological:      Mental Status: He is alert and oriented to person, place, and time. Mental status is at baseline.         Fluids    Intake/Output Summary (Last 24 hours) at 3/28/2025 1739  Last data filed at 3/28/2025 1625  Gross per 24 hour    Intake --   Output 18 ml   Net -18 ml        Laboratory  Recent Labs     03/26/25 0337 03/27/25 0039   WBC 11.9* 11.5*   RBC 4.80 4.75   HEMOGLOBIN 12.9* 13.3*   HEMATOCRIT 42.3 42.0   MCV 88.1 88.4   MCH 26.9* 28.0   MCHC 30.5* 31.7*   RDW 47.6 48.1   PLATELETCT 511* 501*   MPV 9.6 10.0     Recent Labs     03/26/25 0337 03/27/25 0039   SODIUM 136 137   POTASSIUM 4.9 4.7   CHLORIDE 101 100   CO2 26 26   GLUCOSE 101* 102*   BUN 16 17   CREATININE 0.77 0.77   CALCIUM 8.7 8.8                     Imaging  CT-ABDOMEN-PELVIS WITH   Final Result      1.  Decreased gallbladder fossa fluid collection status post percutaneous drain placement now measuring 3.9 x 5.7 cm, previously 10.2 x 6.8 cm.   2.  Small right effusion, partially visualized.   3.  Bilateral lower lobe atelectasis and/or consolidation, underlying infection is possible.   4.  Unchanged trace perihepatic fluid adjacent to the dome of the liver.   5.  Status post cholecystectomy.   6.  Small fat-containing bilateral internal hernias.   7.  Enlarged prostate.   8.  Atherosclerosis.      DX-CHEST-PORTABLE (1 VIEW)   Final Result      Findings suggestive of pulmonary edema with elevation of the right hemidiaphragm and bibasilar atelectasis/infiltrate.      NM-HEPATOBILIARY SCAN   Final Result      No scintigraphy evidence of bile leak during the 60 minute imaging.      CT-IMAGE-GUIDED DRAIN PERITONEAL   Final Result      1.  CT GUIDED PLACEMENT OF A PERCUTANEOUS DRAINAGE CATHETER IN A GALLBLADDER FOSSA FLUID COLLECTION.   2.  THE CURRENT PLAN IS TO MONITOR DRAINAGE OUTPUT AND OBTAIN A FOLLOWUP CT SCAN IN 5-7 DAYS IF CLINICALLY INDICATED.           Assessment/Plan  * Gallbladder abscess- (present on admission)  Assessment & Plan  Patient noted to have a 10 cm fluid collection at the gallbladder fossa  Recent cholecystitis status post cholecystectomy  General surgery was consulted, recommends evaluation for bile leak, IR consult for drainage of fluid.    S/p  HIDA 3/22, negative for bile leak.  S/p  IR drain 3/22, output of 5 cc  Gallbladder cultures positive for Enterococcus faecalis, pansensitive.  DC IV Zosyn - switch to amoxicillin    3/27  Repeat CT A/P showed decreased gallbladder fossa fluid-filled collection s/p percutaneous drain placement.    3/28  Plan for CAT scan in 5 to 7 days repeat, tentatively 4/2/2025  Continue amoxicillin.  On IV Dilaudid and p.o. oxy as needed.  Continue pulse oximetry monitoring to monitor for hypoxia and respiratory depression while receiving IV narcotic medications      Bilateral inguinal hernia without obstruction or gangrene  Assessment & Plan  Seen on imaging CT.     S/P cholecystectomy  Assessment & Plan  Per history     Abnormal finding on lung imaging  Assessment & Plan  CT repeat 3/27 read with bilateral lower lobe atelectasis and/or consolidation, underlying infection is possible.  Procalcitonin WNL and Pt asymptomatic. Less suspicion for PNA. Continue to monitor.     Enlarged prostate  Assessment & Plan  Seen on imaging    Circulating anticoagulant disorder (HCC)  Assessment & Plan  On Eliquis at home for history of DVT.    Acute on chronic respiratory failure with hypoxia ([facility])  Assessment & Plan  Patient on 3 L at baseline at home.  Initially required 6 L at the admission  In the setting of possible CHF exacerbation.  S/p IV Lasix.  Switch to home p.o. Lasix dose, 40 mg once daily.    3/28  Stable on 3L.  Continue p.o. Lasix 40 mg once daily.      History of pulmonary embolism  Assessment & Plan  Home Eliquis on hold for possible procedure    ACP (advance care planning)  Assessment & Plan  Was discussed by previous provider, full code    SIRS (systemic inflammatory response syndrome) ([facility])  Assessment & Plan  SIRS criteria identified on my evaluation include:  Tachycardia, with heart rate greater than 90 BPM and Leukocytosis, with WBC greater than 12,000  Improved    DVT (deep venous thrombosis) ([facility])  Assessment &  Plan  Continue on home Eliquis.  CTPA at outside hospital negative for pulmonary embolism    Acute on chronic systolic congestive heart failure (HCC)  Assessment & Plan  History of heart failure with ejection fraction 45%  Noted to have lower extremity swelling and small pleural effusion seen on CAT scan   Bnp 986 on admission.  S/p IV Lasix  Continue home p.o. Lasix.  Wean oxygen as tolerated back to baseline.           VTE prophylaxis: Eliquis    I have performed a physical exam and reviewed and updated ROS and Plan today (3/28/2025). In review of yesterday's note (3/27/2025), there are no changes except as documented above.    Greater than 52 minutes spent prepping to see patient (e.g. review of tests) obtaining and/or reviewing separately obtained history. Performing a medically appropriate examination and evaluation.  Counseling and educating the patient/family/caregiver.  Ordering medications, tests, or procedures.  Referring and communicating with other health care professionals.  Documenting clinical information in EPIC.  Independently interpreting results and communicating results to patient/family/caregiver.  Care coordination.

## 2025-03-29 NOTE — PROGRESS NOTES
Radiology Progress Note   Author: SHAWNEE Cervantes Date & Time created: 3/29/2025  5:53 PM   Date of admission  3/21/2025  Note to reader: this note follows the APSO format rather than the historical SOAP format. Assessment and plan located at the top of the note for ease of use.    Chief Complaint  75 y.o. male admitted 3/21/2025 with abdominal pain      HPI  75-year-old male with past medical history significant for HFrEF, PE on Eliquis, morbid obesity, and recent cholecystitis s/p cholecystectomy (03/13/25) transferred from outside facility for CT finding of gallbladder fossa fluid collection after presenting with continued abdominal pain.  IR was consulted and patient underwent CT-guided gallbladder fossa fluid collection drain with IR Dr. Mooney on 03/22/2025. 80 mL old, cloudy, bloody fluid was drained at time of drain placement    Interval History:   03/23/2025 - GB fossa drain to RUQ with no output in the last 24 hours.  I flushed the drain with 10 mLs NS with moderate amount of maroon fluid returned in the suction bulb. Labs reviewed by me including WBC 16.1, H&H 12.4/37.5, creatinine 0.75.  GB fossa fluid cultures pending.  On ABX.  HIDA scan without evidence of biliary leak. Pt had many questions regarding plan of care and all questions answered to patient's satisfaction using  line.  I reviewed all IDT notes, reviewed all imaging, and discussed patient with bedside RN.    03/24/2025 - GB fossa drain to RUQ with 5 mL maroon output in the last 24 hours.  I flushed the drain with 10 mLs NS with instant return of moderate maroon output upon return to bulb suction. Labs reviewed by me including: WBC 13.8, H&H 12.5/39.3, creatinine 0.8. GB fossa fluid cultures positive for Enterococcus faecalis. On ABX. I reviewed all IDT notes, reviewed all imaging, and discussed patient with Hospitalist and hospital nursing staff.     03/25/2025 - GB fossa drain to RUQ with 30 mL dark maroon output in  "the last 24 hours. I flushed the drain with 10 mLs NS with instant return of moderate maroon output upon returning to bulb suction. Labs reviewed by me including: WBC 11.7, H&H 13.3/42.7, creatinine 0.78. GB fossa fluid cultures positive. On ABX. I reviewed all IDT notes and discussed patient care with Hospitalist.    03/26/2025 - GB fossa drain to RUQ with 30 mL dark maroon/brown output in the last 24 hours. I flushed the drain with 10 mLs NS with instant return of light maroon output upon returning to bulb suction. Labs reviewed by me including: WBC 11.9, H&H 12.9/42.3, creatinine 0.77. GB fossa fluid cultures positive. On ABX. I reviewed all IDT notes and discussed patient care with hospital nursing staff.    3/27/25 - CT abdomen/pelvis today shows: \"Decreased gallbladder fossa fluid collection status post percutaneous drain placement now measuring 3.9 x 5.7 cm, previously 10.2 x 6.8 cm\" GB fossa drain to RUQ with 33 mL dark maroon/brown output in the last 24 hours. I flushed the drain with 10 mLs NS with instant return of light maroon output upon returning to bulb suction. Labs reviewed by me including: WBC 11.5, H&H 13.3/42, creatinine 0.77. GB fossa fluid cultures positive. On ABX. I reviewed plan with patient at bedside.    3/28/25 - GB fossa drain to RUQ with 18 mL dark maroon/brown output in the last 24 hours. I flushed the drain with 10 mLs NS with instant return of maroon output upon returning to bulb suction. No new labs to review today. GB fossa fluid cultures positive. On ABX. I reviewed plan with patient at bedside and reviewed IDT notes. I discussed plan of care with Hospitalist.    3/29/25 - Suction bulb found to be missing port plug overnight, bulb replaced by hospital nursing staff; bulb holding suction upon my assessment today. GB fossa drain to RUQ with 10 mL dark maroon/brown output in the last 24 hours. Drain flushed by hospital nursing staff. Labs I reviewed today: WBC 9, H/H 13.5/42.5, Cr " 0.76. GB fossa fluid cultures positive. On ABX. I reviewed plan with patient at bedside, discussed patient with hospitalist, and reviewed IDT notes.    Assessment/Plan     Principal Problem:    Gallbladder abscess  Active Problems:    Abdominal pain    Acute on chronic systolic congestive heart failure (HCC)    DVT (deep venous thrombosis) (HCC)    SIRS (systemic inflammatory response syndrome) (HCC)    ACP (advance care planning)    History of pulmonary embolism    SBO (small bowel obstruction) (HCC)    Acute on chronic respiratory failure with hypoxia (HCC)    Circulating anticoagulant disorder (HCC)    Enlarged prostate    Abnormal finding on lung imaging    S/P cholecystectomy    Bilateral inguinal hernia without obstruction or gangrene      Plan IR  - Continue to irrigate GB fossa drain with 10 ml of sterile saline each shift  - GB fossa fluid cultures positive for Enterococcus faecalis  - Hospitalist Gen Surg, and Urology following   - Follow up CT scan in 5-7 days (~4/2/25), Possibly sooner if drain output is consistently less than ~10mL/24hrs.  - Continue to monitor drain output  - IR will continue to follow.  -  -Thank you for allowing Interventional Radiology team to participate in the patients care, if any additional care or requests are needed in the future please do not hesitate to call or place IR order.        Review of Systems  Physical Exam   Review of Systems   Constitutional:  Negative for chills, fever and malaise/fatigue.   Respiratory:  Negative for shortness of breath.    Cardiovascular:  Negative for chest pain and palpitations.   Gastrointestinal:  Negative for abdominal pain, nausea and vomiting.   Neurological:  Negative for weakness and headaches.      Vitals:    03/29/25 1733   BP: 95/65   Pulse: 79   Resp: 16   Temp: 37 °C (98.6 °F)   SpO2: 93%        Physical Exam  Constitutional:       General: He is not in acute distress.  Cardiovascular:      Rate and Rhythm: Normal rate.       Pulses: Normal pulses.   Pulmonary:      Effort: Pulmonary effort is normal. No respiratory distress.   Abdominal:      Tenderness: There is abdominal tenderness (mild at drain insertion site).      Comments: IR drain to RUQ   Skin:     General: Skin is warm and dry.   Neurological:      General: No focal deficit present.      Mental Status: He is alert and oriented to person, place, and time.   Psychiatric:         Mood and Affect: Mood normal.         Behavior: Behavior normal.          Labs    Recent Labs     03/27/25 0039 03/29/25  0954   WBC 11.5* 9.0   RBC 4.75 4.94   HEMOGLOBIN 13.3* 13.5*   HEMATOCRIT 42.0 42.5   MCV 88.4 86.0   MCH 28.0 27.3   MCHC 31.7* 31.8*   RDW 48.1 46.9   PLATELETCT 501* 463*   MPV 10.0 9.7     Recent Labs     03/27/25  0039 03/29/25  0954   SODIUM 137 137   POTASSIUM 4.7 4.5   CHLORIDE 100 98   CO2 26 29   GLUCOSE 102* 98   BUN 17 17   CREATININE 0.77 0.76   CALCIUM 8.8 8.9     Recent Labs     03/27/25  0039 03/29/25  0954   CREATININE 0.77 0.76     CT-ABDOMEN-PELVIS WITH   Final Result      1.  Decreased gallbladder fossa fluid collection status post percutaneous drain placement now measuring 3.9 x 5.7 cm, previously 10.2 x 6.8 cm.   2.  Small right effusion, partially visualized.   3.  Bilateral lower lobe atelectasis and/or consolidation, underlying infection is possible.   4.  Unchanged trace perihepatic fluid adjacent to the dome of the liver.   5.  Status post cholecystectomy.   6.  Small fat-containing bilateral internal hernias.   7.  Enlarged prostate.   8.  Atherosclerosis.      DX-CHEST-PORTABLE (1 VIEW)   Final Result      Findings suggestive of pulmonary edema with elevation of the right hemidiaphragm and bibasilar atelectasis/infiltrate.      NM-HEPATOBILIARY SCAN   Final Result      No scintigraphy evidence of bile leak during the 60 minute imaging.      CT-IMAGE-GUIDED DRAIN PERITONEAL   Final Result      1.  CT GUIDED PLACEMENT OF A PERCUTANEOUS DRAINAGE CATHETER IN  "A GALLBLADDER FOSSA FLUID COLLECTION.   2.  THE CURRENT PLAN IS TO MONITOR DRAINAGE OUTPUT AND OBTAIN A FOLLOWUP CT SCAN IN 5-7 DAYS IF CLINICALLY INDICATED.        INR   Date Value Ref Range Status   03/21/2025 1.09 0.87 - 1.13 Final     Comment:     INR - Non-therapeutic Reference Range: 0.87-1.13  INR - Therapeutic Reference Range: 2.0-4.0       No results found for: \"POCINR\"     Intake/Output Summary (Last 24 hours) at 3/23/2025 0758  Last data filed at 3/23/2025 0714  Gross per 24 hour   Intake 0 ml   Output 0 ml   Net 0 ml      I have personally reviewed the above labs and imaging      I have performed a physical exam and reviewed and updated ROS and Plan today (3/29/2025).     35 minutes in directly providing and coordinating care and extensive data review.  No time overlap and excludes procedures.   "

## 2025-03-29 NOTE — PROGRESS NOTES
Bedside report received.  Assessment complete.  A&O x 4.  Patient ambulates with SBA assist. Bed alarm on. Pt gets up without calling for assistance. Educated patient to use call light. Pt verbalized understanding.   Patient has 0/10 pain.   Denies N&V. Tolerating cardiac diet.  RUQ IR drain DIP, CDI, with dark brown blood output.  + void, + flatus, 3/28 BM.  Patient denies SOB and chest pain.  Review plan with of care with patient. Call light and personal belongings within reach. Hourly rounding in place. All needs met at this time.

## 2025-03-29 NOTE — PROGRESS NOTES
Assumed care of patient at 0645. Bedside report received. Assessment complete.  utilized for assessment [id number].     AA&Ox4. Denies CP/SOB.3L O2, patient is on 3L home oxygen  Reporting pain controlled at this time. Medicated per MAR. Declined intervention at this time.   Skin per flow sheets. IR drain to RUQ, 4 lap sites to abdomen dermabond HENRY    Tolerating diet. Denies N/V.  + void. + BM. Last BM 3/28    Pt ambulates SBA    Fall prevention measures in place per flowsheets.  Educated on SCD use, patient declined at this time, ambulating frequently, Pharmacologic VTE prophylaxis in use.    Plan of care discussed, all questions answered.  Repeat CT 4/2, patient aware. Educated regarding importance of oral care. Oral care kit at bedside. Call light is within reach, treaded slipper socks on, bed in lowest/ locked position, hourly rounding in place, all needs met at this time.

## 2025-03-30 PROCEDURE — 700102 HCHG RX REV CODE 250 W/ 637 OVERRIDE(OP): Performed by: STUDENT IN AN ORGANIZED HEALTH CARE EDUCATION/TRAINING PROGRAM

## 2025-03-30 PROCEDURE — 99232 SBSQ HOSP IP/OBS MODERATE 35: CPT

## 2025-03-30 PROCEDURE — 770001 HCHG ROOM/CARE - MED/SURG/GYN PRIV*

## 2025-03-30 PROCEDURE — A9270 NON-COVERED ITEM OR SERVICE: HCPCS

## 2025-03-30 PROCEDURE — A9270 NON-COVERED ITEM OR SERVICE: HCPCS | Performed by: STUDENT IN AN ORGANIZED HEALTH CARE EDUCATION/TRAINING PROGRAM

## 2025-03-30 PROCEDURE — 700102 HCHG RX REV CODE 250 W/ 637 OVERRIDE(OP)

## 2025-03-30 RX ADMIN — AMOXICILLIN 1000 MG: 500 CAPSULE ORAL at 04:37

## 2025-03-30 RX ADMIN — OXYCODONE 5 MG: 5 TABLET ORAL at 19:46

## 2025-03-30 RX ADMIN — ACETAMINOPHEN 650 MG: 325 TABLET ORAL at 04:36

## 2025-03-30 RX ADMIN — FUROSEMIDE 40 MG: 20 TABLET ORAL at 04:36

## 2025-03-30 RX ADMIN — APIXABAN 5 MG: 5 TABLET, FILM COATED ORAL at 04:37

## 2025-03-30 RX ADMIN — LOSARTAN POTASSIUM 50 MG: 50 TABLET, FILM COATED ORAL at 04:37

## 2025-03-30 RX ADMIN — APIXABAN 5 MG: 5 TABLET, FILM COATED ORAL at 19:26

## 2025-03-30 ASSESSMENT — PAIN DESCRIPTION - PAIN TYPE
TYPE: ACUTE PAIN

## 2025-03-30 NOTE — PROGRESS NOTES
Bedside report received.  Assessment complete.  A&O x 4.  Patient ambulates with SBA assist. Bed alarm on. Educated patient to use call light. Pt verbalized understanding.   Patient has 7/10 pain. Medicated per MAR.  Denies N&V. Tolerating cardiac diet.  RUQ IR drain DIP, CDI, with dark brown blood output.  + void, + flatus, 3/29 BM.  Patient denies SOB and chest pain.  IS 1500.  Review plan with of care with patient. Call light and personal belongings within reach. Hourly rounding in place. All needs met at this time

## 2025-03-30 NOTE — PROGRESS NOTES
Hospital Medicine Daily Progress Note    Date of Service  3/30/2025    Chief Complaint  Eduardo Cuellar is a 75 y.o. male admitted 3/21/2025 with surgical site fluid collection    Hospital Course    75-year-old male with morbid obesity, HFrEF, PE on Eliquis and recent laparoscopic cholecystectomy C/B cholecystitis 3/13/2025 who presented from [facility] ED with abdominal pain and SOB.    At the outside facility ED, imaging showed a 10 cm fluid collection within the gallbladder fossa favored to represent a postoperative seroma in the absence of clinical signs of infection.  The morphology of the gas within the fossa collection raises the possibility of retained hemostatic material.  There is small right pleural effusion with subsegmental passive atelectasis of the right lung base.  Patient was recommended IR drain and HIDA scan for evaluation of bile leak.  They had no IR services all weekend at [facility] therefore transfer was requested.    In [facility], patient underwent CT-guided drain placement 3/22/2024 with IR.  He was started on IV Zosyn.  HIDA scan showed no evidence of bile leak.  His gallbladder fossa fluid cultures were positive for Enterococcus faecalis.  He was transitioned to amoxicillin.  The repeat CAT scan showed decreased gallbladder fossa fluid-filled collection s/p percutaneous drain placement.  Per discussion with IR, recommended to keep the drain and have a follow-up CAT scan inpatient in 5 to 7 days, tentatively 4/2/2025 versus discharge with close follow-up with surgery. Discussed with surgery, Dr. Fuentes -patient can follow-up with them for the drain.  Advised patient to call their office the day after discharge for this to be scheduled.     Interval Problem Update    3/30  JAYDA.  HDS, on 3.5L NC overnight.  10 cc KELLY drain output overnight.  Patient feels well overall.  No new complaints.  Plan for repeat CT scan 4/2/2025 or possibly sooner if drain output is consistently less  than 10 mL/24 hours.    I have discussed this patient's plan of care and discharge plan at IDT rounds today with Case Management, Nursing, Nursing leadership, and other members of the IDT team.    Consultants/Specialty  General surgery    Code Status  Full Code    Disposition    I have placed the appropriate orders for post-discharge needs.    Review of Systems   All 12 systems were reviewed and negative except as mentioned above      Physical Exam  Temp:  [36.4 °C (97.5 °F)-37 °C (98.6 °F)] 36.4 °C (97.5 °F)  Pulse:  [71-79] 76  Resp:  [16] 16  BP: ()/(65-95) 105/65  SpO2:  [93 %-96 %] 96 %    Physical Exam  Constitutional:       General: He is not in acute distress.     Appearance: He is not ill-appearing.   Cardiovascular:      Rate and Rhythm: Normal rate and regular rhythm.      Pulses: Normal pulses.      Heart sounds: Normal heart sounds.   Pulmonary:      Effort: No respiratory distress.      Breath sounds: No wheezing.   Abdominal:      General: Bowel sounds are normal.      Palpations: Abdomen is soft.      Tenderness: There is abdominal tenderness. There is no guarding.      Comments: KELLY drain in place.  Mild abdominal pain.   Musculoskeletal:         General: No swelling or tenderness.      Cervical back: Normal range of motion and neck supple.      Right lower leg: No edema.      Left lower leg: No edema.   Skin:     General: Skin is warm.   Neurological:      Mental Status: He is alert and oriented to person, place, and time. Mental status is at baseline.         Fluids    Intake/Output Summary (Last 24 hours) at 3/30/2025 1032  Last data filed at 3/30/2025 0300  Gross per 24 hour   Intake 100 ml   Output 10 ml   Net 90 ml        Laboratory  Recent Labs     03/29/25  0954   WBC 9.0   RBC 4.94   HEMOGLOBIN 13.5*   HEMATOCRIT 42.5   MCV 86.0   MCH 27.3   MCHC 31.8*   RDW 46.9   PLATELETCT 463*   MPV 9.7     Recent Labs     03/29/25  0954   SODIUM 137   POTASSIUM 4.5   CHLORIDE 98   CO2 29   GLUCOSE  98   BUN 17   CREATININE 0.76   CALCIUM 8.9                     Imaging  CT-ABDOMEN-PELVIS WITH   Final Result      1.  Decreased gallbladder fossa fluid collection status post percutaneous drain placement now measuring 3.9 x 5.7 cm, previously 10.2 x 6.8 cm.   2.  Small right effusion, partially visualized.   3.  Bilateral lower lobe atelectasis and/or consolidation, underlying infection is possible.   4.  Unchanged trace perihepatic fluid adjacent to the dome of the liver.   5.  Status post cholecystectomy.   6.  Small fat-containing bilateral internal hernias.   7.  Enlarged prostate.   8.  Atherosclerosis.      DX-CHEST-PORTABLE (1 VIEW)   Final Result      Findings suggestive of pulmonary edema with elevation of the right hemidiaphragm and bibasilar atelectasis/infiltrate.      NM-HEPATOBILIARY SCAN   Final Result      No scintigraphy evidence of bile leak during the 60 minute imaging.      CT-IMAGE-GUIDED DRAIN PERITONEAL   Final Result      1.  CT GUIDED PLACEMENT OF A PERCUTANEOUS DRAINAGE CATHETER IN A GALLBLADDER FOSSA FLUID COLLECTION.   2.  THE CURRENT PLAN IS TO MONITOR DRAINAGE OUTPUT AND OBTAIN A FOLLOWUP CT SCAN IN 5-7 DAYS IF CLINICALLY INDICATED.           Assessment/Plan  * Gallbladder abscess- (present on admission)  Assessment & Plan  Patient noted to have a 10 cm fluid collection at the gallbladder fossa  Recent cholecystitis status post cholecystectomy  General surgery was consulted, recommends evaluation for bile leak, IR consult for drainage of fluid.    S/p HIDA 3/22, negative for bile leak.  S/p  IR drain 3/22, output of 5 cc  Gallbladder cultures positive for Enterococcus faecalis, pansensitive.  DC IV Zosyn - switch to amoxicillin    3/27  Repeat CT A/P showed decreased gallbladder fossa fluid-filled collection s/p percutaneous drain placement.    3/29  Plan for CAT scan in 5 to 7 days repeat, tentatively 4/2/2025  Continue amoxicillin.  Continue multimodal pain regimen.    Bilateral  inguinal hernia without obstruction or gangrene  Assessment & Plan  Seen on imaging CT.     S/P cholecystectomy  Assessment & Plan  Per history     Abnormal finding on lung imaging  Assessment & Plan  CT repeat 3/27 read with bilateral lower lobe atelectasis and/or consolidation, underlying infection is possible.  Procalcitonin WNL and Pt asymptomatic. Less suspicion for PNA. Continue to monitor.     Enlarged prostate  Assessment & Plan  Seen on imaging    Circulating anticoagulant disorder (HCC)  Assessment & Plan  On Eliquis at home for history of DVT.    Acute on chronic respiratory failure with hypoxia (HCC)  Assessment & Plan  Patient on 3 L at baseline at home.  Initially required 6 L at the admission  In the setting of possible CHF exacerbation.  S/p IV Lasix.  Switch to home p.o. Lasix dose, 40 mg once daily.    3/30  Stable on 3L.  Continue p.o. Lasix 40 mg once daily.      History of pulmonary embolism  Assessment & Plan  Continue home Eliquis.    ACP (advance care planning)  Assessment & Plan  Was discussed by previous provider, full code    SIRS (systemic inflammatory response syndrome) ([facility])  Assessment & Plan  SIRS criteria identified on my evaluation include:  Tachycardia, with heart rate greater than 90 BPM and Leukocytosis, with WBC greater than 12,000  Improved    DVT (deep venous thrombosis) ([facility])  Assessment & Plan  Continue on home Eliquis.  CTPA at outside hospital negative for pulmonary embolism    Acute on chronic systolic congestive heart failure (HCC)  Assessment & Plan  History of heart failure with ejection fraction 45%  Noted to have lower extremity swelling and small pleural effusion seen on CAT scan   Bnp 986 on admission.  S/p IV Lasix  Continue home p.o. Lasix.  Back to baseline home 3L NC.         VTE prophylaxis: Eliquis    I have performed a physical exam and reviewed and updated ROS and Plan today (3/30/2025). In review of yesterday's note (3/29/2025), there are no changes except  as documented above.

## 2025-03-30 NOTE — CARE PLAN
Problem: Knowledge Deficit - Standard  Goal: Patient and family/care givers will demonstrate understanding of plan of care, disease process/condition, diagnostic tests and medications  Outcome: Progressing     Problem: Respiratory  Goal: Patient will achieve/maintain optimum respiratory ventilation and gas exchange  Outcome: Progressing     Problem: Physical Regulation  Goal: Diagnostic test results will improve  Outcome: Progressing  Goal: Signs and symptoms of infection will decrease  Outcome: Progressing     Problem: Pain - Standard  Goal: Alleviation of pain or a reduction in pain to the patient’s comfort goal  Outcome: Progressing     Problem: Fall Risk  Goal: Patient will remain free from falls  Outcome: Progressing     Problem: Mobility  Goal: Patient's capacity to carry out activities will improve  Outcome: Progressing   The patient is Stable - Low risk of patient condition declining or worsening    Shift Goals  Clinical Goals: ir drain mngmt, ooba  Patient Goals: rest,  Family Goals: none present at this time    Progress made toward(s) clinical / shift goals:  Patient medicated per MAR for pain, IR drain CDI, DIP, with dark brown blood output. Flushed per orders. Patient slept throughout the night.     Patient is not progressing towards the following goals:

## 2025-03-30 NOTE — PROGRESS NOTES
Assumed care of patient at 0645. Bedside report received. Assessment complete.      AA&Ox4. Denies CP/SOB.3L O2, patient is on 3L home oxygen    Reporting pain controlled at this time. Medicated per MAR. Declined intervention at this time.   Skin per flow sheets. IR drain to RUQ, 4 lap sites to abdomen dermabond HENRY     Tolerating diet. Denies N/V.  + void. + BM. Last BM 3/28     Pt ambulates SBA     Fall prevention measures in place per flowsheets.  Educated on SCD use, patient declined at this time, ambulating frequently, Pharmacologic VTE prophylaxis in use.     Plan of care discussed, all questions answered.  Repeat CT 4/2  Educated regarding importance of oral care. Oral care kit at bedside. Call light is within reach, treaded slipper socks on, bed in lowest/ locked position, hourly rounding in place, all needs met at this time

## 2025-03-31 VITALS
OXYGEN SATURATION: 91 % | HEART RATE: 80 BPM | TEMPERATURE: 98.8 F | WEIGHT: 254.19 LBS | HEIGHT: 66 IN | DIASTOLIC BLOOD PRESSURE: 59 MMHG | SYSTOLIC BLOOD PRESSURE: 97 MMHG | BODY MASS INDEX: 40.85 KG/M2 | RESPIRATION RATE: 16 BRPM

## 2025-03-31 PROCEDURE — 99232 SBSQ HOSP IP/OBS MODERATE 35: CPT

## 2025-03-31 PROCEDURE — A9270 NON-COVERED ITEM OR SERVICE: HCPCS | Performed by: STUDENT IN AN ORGANIZED HEALTH CARE EDUCATION/TRAINING PROGRAM

## 2025-03-31 PROCEDURE — 700102 HCHG RX REV CODE 250 W/ 637 OVERRIDE(OP): Performed by: STUDENT IN AN ORGANIZED HEALTH CARE EDUCATION/TRAINING PROGRAM

## 2025-03-31 PROCEDURE — A9270 NON-COVERED ITEM OR SERVICE: HCPCS

## 2025-03-31 PROCEDURE — 700102 HCHG RX REV CODE 250 W/ 637 OVERRIDE(OP)

## 2025-03-31 PROCEDURE — 770001 HCHG ROOM/CARE - MED/SURG/GYN PRIV*

## 2025-03-31 PROCEDURE — 700101 HCHG RX REV CODE 250

## 2025-03-31 RX ORDER — METHOCARBAMOL 750 MG/1
750 TABLET, FILM COATED ORAL 4 TIMES DAILY
Status: DISCONTINUED | OUTPATIENT
Start: 2025-03-31 | End: 2025-04-03 | Stop reason: HOSPADM

## 2025-03-31 RX ORDER — LIDOCAINE 4 G/G
1 PATCH TOPICAL EVERY 24 HOURS
Status: DISCONTINUED | OUTPATIENT
Start: 2025-03-31 | End: 2025-04-03 | Stop reason: HOSPADM

## 2025-03-31 RX ADMIN — OXYCODONE 5 MG: 5 TABLET ORAL at 04:48

## 2025-03-31 RX ADMIN — APIXABAN 5 MG: 5 TABLET, FILM COATED ORAL at 04:45

## 2025-03-31 RX ADMIN — METHOCARBAMOL 750 MG: 750 TABLET ORAL at 17:38

## 2025-03-31 RX ADMIN — Medication 5 MG: at 00:41

## 2025-03-31 RX ADMIN — METHOCARBAMOL 750 MG: 750 TABLET ORAL at 21:00

## 2025-03-31 RX ADMIN — APIXABAN 5 MG: 5 TABLET, FILM COATED ORAL at 17:38

## 2025-03-31 RX ADMIN — LIDOCAINE 1 PATCH: 4 PATCH TOPICAL at 11:28

## 2025-03-31 RX ADMIN — METHOCARBAMOL 750 MG: 750 TABLET ORAL at 11:28

## 2025-03-31 RX ADMIN — FUROSEMIDE 40 MG: 20 TABLET ORAL at 04:48

## 2025-03-31 RX ADMIN — OXYCODONE 5 MG: 5 TABLET ORAL at 00:43

## 2025-03-31 ASSESSMENT — ENCOUNTER SYMPTOMS
CHILLS: 0
COUGH: 0
NAUSEA: 0
FEVER: 0
PSYCHIATRIC NEGATIVE: 1
ABDOMINAL PAIN: 1
VOMITING: 0
SHORTNESS OF BREATH: 0
HEADACHES: 0
CONSTIPATION: 0
WEAKNESS: 0
MYALGIAS: 0

## 2025-03-31 ASSESSMENT — PAIN DESCRIPTION - PAIN TYPE
TYPE: ACUTE PAIN

## 2025-03-31 NOTE — PROGRESS NOTES
Radiology Progress Note     Author: Kathia Grover DNP Date & Time created: 3/31/2025  3:56 PM   Date of admission  3/21/2025  Note to reader: this note follows the APSO format rather than the historical SOAP format. Assessment and plan located at the top of the note for ease of use.    Chief Complaint  75 y.o. male admitted 3/21/2025 with abdominal pain    HPI  Eduardo Donis is a 75-year-old male with PMH significant for HFrEF, PE (on Eliquis), morbid obesity, and recent cholecystitis s/p cholecystectomy (03/13/25) transferred from outside facility for CT finding of gallbladder fossa fluid collection after presenting with continued abdominal pain.  IR was consulted and patient underwent CT-guided gallbladder fossa fluid collection drain with IR Dr. Mooney on 03/22/2025. 80 mL old, cloudy, bloody fluid was drained at time of drain placement    Interval History:   03/23/2025 - GB fossa drain to RUQ with no output in the last 24 hours.  I flushed the drain with 10 mLs NS with moderate amount of maroon fluid returned in the suction bulb. Labs reviewed by me including WBC 16.1, H&H 12.4/37.5, creatinine 0.75.  GB fossa fluid cultures pending.  On ABX.  HIDA scan without evidence of biliary leak. Pt had many questions regarding plan of care and all questions answered to patient's satisfaction using  line.  I reviewed all IDT notes, reviewed all imaging, and discussed patient with bedside RN.    03/24/2025 - GB fossa drain to RUQ with 5 mL maroon output in the last 24 hours.  I flushed the drain with 10 mLs NS with instant return of moderate maroon output upon return to bulb suction. Labs reviewed by me including: WBC 13.8, H&H 12.5/39.3, creatinine 0.8. GB fossa fluid cultures positive for Enterococcus faecalis. On ABX. I reviewed all IDT notes, reviewed all imaging, and discussed patient with Hospitalist and hospital nursing staff.     03/25/2025 - GB fossa drain to RUQ with 30 mL dark maroon output in  "the last 24 hours. I flushed the drain with 10 mLs NS with instant return of moderate maroon output upon returning to bulb suction. Labs reviewed by me including: WBC 11.7, H&H 13.3/42.7, creatinine 0.78. GB fossa fluid cultures positive. On ABX. I reviewed all IDT notes and discussed patient care with Hospitalist.    03/26/2025 - GB fossa drain to RUQ with 30 mL dark maroon/brown output in the last 24 hours. I flushed the drain with 10 mLs NS with instant return of light maroon output upon returning to bulb suction. Labs reviewed by me including: WBC 11.9, H&H 12.9/42.3, creatinine 0.77. GB fossa fluid cultures positive. On ABX. I reviewed all IDT notes and discussed patient care with hospital nursing staff.    3/27/25 - CT abdomen/pelvis today shows: \"Decreased gallbladder fossa fluid collection status post percutaneous drain placement now measuring 3.9 x 5.7 cm, previously 10.2 x 6.8 cm\" GB fossa drain to RUQ with 33 mL dark maroon/brown output in the last 24 hours. I flushed the drain with 10 mLs NS with instant return of light maroon output upon returning to bulb suction. Labs reviewed by me including: WBC 11.5, H&H 13.3/42, creatinine 0.77. GB fossa fluid cultures positive. On ABX. I reviewed plan with patient at bedside.    3/28/25 - GB fossa drain to RUQ with 18 mL dark maroon/brown output in the last 24 hours. I flushed the drain with 10 mLs NS with instant return of maroon output upon returning to bulb suction. No new labs to review today. GB fossa fluid cultures positive. On ABX. I reviewed plan with patient at bedside and reviewed IDT notes. I discussed plan of care with Hospitalist.    3/29/25 - Suction bulb found to be missing port plug overnight, bulb replaced by hospital nursing staff; bulb holding suction upon my assessment today. GB fossa drain to RUQ with 10 mL dark maroon/brown output in the last 24 hours. Drain flushed by hospital nursing staff. Labs I reviewed today: WBC 9, H/H 13.5/42.5, Cr " 0.76. GB fossa fluid cultures positive. On ABX. I reviewed plan with patient at bedside, discussed patient with hospitalist, and reviewed IDT notes.    3/31/2025: GB fossa drain (12F) to bulb suction less than 10 mL dark brown fluid reported output in the last 24 hours.  Irrigated drain with 20 mL sterile saline with improved and increased drain output (approximately 30 mL).  Patient is [facility] to [facility], reclining in bed, talking on his phone.  He reports continued abdominal pain, primarily in the area of drain.  He denies nausea or vomiting, and reports regular bowel movements.  I reviewed patient's most recent labs including WBC 9.0<11.5, Hgb 13.5, CR 0.76.  Coordinated post IR procedure care with patient and hospital nursing staff.    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 While patient remains in the hospital    -Thank you for allowing Interventional Radiology team to participate in the patients care, if any additional care or requests are needed in the future please do not hesitate to call or place IR order.        Review of Systems   Physical Exam   Review of Systems   Constitutional:  Positive for malaise/fatigue. Negative for chills and fever.   Respiratory:  Negative for cough and shortness of breath.    Cardiovascular:  Negative for chest pain.   Gastrointestinal:  Positive for abdominal pain (At drain site). Negative for constipation, nausea and vomiting.   Musculoskeletal:  Negative for myalgias.   Neurological:  Negative for weakness and headaches.   Psychiatric/Behavioral: Negative.        Vitals:    03/31/25 1515   BP: 123/70   Pulse: 76   Resp: 18   Temp: 36.9 °C (98.4 °F)   SpO2: 94%        Physical Exam  Vitals and nursing note reviewed.   Constitutional:       General: He is not in acute distress.     Appearance: Normal appearance. He is not ill-appearing.   HENT:      Head: Atraumatic.   Cardiovascular:      Rate and Rhythm: Normal rate.      Pulses: Normal pulses.   Pulmonary:      Effort: Pulmonary effort is normal. No respiratory distress.   Abdominal:      Tenderness: There is abdominal tenderness (mild at drain insertion site).      Comments: IR drain to RUQ   Skin:     General: Skin is warm and dry.      Coloration: Skin is not jaundiced or pale.   Neurological:      General: No focal deficit present.      Mental Status: He is alert and oriented to person, place, and time.   Psychiatric:         Mood and Affect: Mood normal.         Behavior: Behavior normal.          Labs    Recent Labs     03/29/25  0954   WBC 9.0   RBC 4.94   HEMOGLOBIN 13.5*   HEMATOCRIT 42.5   MCV 86.0   MCH 27.3   MCHC 31.8*   RDW 46.9   PLATELETCT 463*   MPV 9.7     Recent Labs     03/29/25  0954   SODIUM 137   POTASSIUM 4.5   CHLORIDE 98   CO2 29   GLUCOSE 98   BUN 17   CREATININE 0.76   CALCIUM 8.9     Recent Labs     03/29/25  0954   CREATININE 0.76     CT-ABDOMEN-PELVIS WITH   Final Result      1.  Decreased gallbladder fossa fluid collection status post percutaneous drain placement now measuring 3.9 x 5.7 cm, previously 10.2 x 6.8 cm.   2.  Small  "right effusion, partially visualized.   3.  Bilateral lower lobe atelectasis and/or consolidation, underlying infection is possible.   4.  Unchanged trace perihepatic fluid adjacent to the dome of the liver.   5.  Status post cholecystectomy.   6.  Small fat-containing bilateral internal hernias.   7.  Enlarged prostate.   8.  Atherosclerosis.      DX-CHEST-PORTABLE (1 VIEW)   Final Result      Findings suggestive of pulmonary edema with elevation of the right hemidiaphragm and bibasilar atelectasis/infiltrate.      NM-HEPATOBILIARY SCAN   Final Result      No scintigraphy evidence of bile leak during the 60 minute imaging.      CT-IMAGE-GUIDED DRAIN PERITONEAL   Final Result      1.  CT GUIDED PLACEMENT OF A PERCUTANEOUS DRAINAGE CATHETER IN A GALLBLADDER FOSSA FLUID COLLECTION.   2.  THE CURRENT PLAN IS TO MONITOR DRAINAGE OUTPUT AND OBTAIN A FOLLOWUP CT SCAN IN 5-7 DAYS IF CLINICALLY INDICATED.        INR   Date Value Ref Range Status   03/21/2025 1.09 0.87 - 1.13 Final     Comment:     INR - Non-therapeutic Reference Range: 0.87-1.13  INR - Therapeutic Reference Range: 2.0-4.0       No results found for: \"POCINR\"     Intake/Output Summary (Last 24 hours) at 3/23/2025 0758  Last data filed at 3/23/2025 0714  Gross per 24 hour   Intake 0 ml   Output 0 ml   Net 0 ml      I have personally reviewed the above labs and imaging      I have performed a physical exam and reviewed and updated ROS and Plan today (3/31/2025).     36 minutes in directly providing and coordinating care and extensive data review.  No time overlap and excludes procedures.   "

## 2025-03-31 NOTE — PROGRESS NOTES
Hospital Medicine Daily Progress Note    Date of Service  3/31/2025    Chief Complaint  Eduardo Cuellar is a 75 y.o. male admitted 3/21/2025 with surgical site fluid collection    Hospital Course    75-year-old male with morbid obesity, HFrEF, PE on Eliquis and recent laparoscopic cholecystectomy C/B cholecystitis 3/13/2025 who presented from [facility] ED with abdominal pain and SOB.    At the outside facility ED, imaging showed a 10 cm fluid collection within the gallbladder fossa favored to represent a postoperative seroma in the absence of clinical signs of infection.  The morphology of the gas within the fossa collection raises the possibility of retained hemostatic material.  There is small right pleural effusion with subsegmental passive atelectasis of the right lung base.  Patient was recommended IR drain and HIDA scan for evaluation of bile leak.  They had no IR services all weekend at [facility] therefore transfer was requested.    [facility], patient underwent CT-guided drain placement 3/22/2024 with IR.  He was started on IV Zosyn.  HIDA scan showed no evidence of bile leak.  His gallbladder fossa fluid cultures were positive for Enterococcus faecalis.  He was transitioned to amoxicillin.  The repeat CAT scan showed decreased gallbladder fossa fluid-filled collection s/p percutaneous drain placement.  Per discussion with IR, recommended to keep the drain and have a follow-up CAT scan inpatient in 5 to 7 days, tentatively 4/2/2025 versus discharge with close follow-up with surgery. Discussed with surgery, Dr. Fuentes -patient can follow-up with them for the drain.  Advised patient to call their office the day after discharge for this to be scheduled.     Interval Problem Update    3/31  MARY WILLIAMSON, on 3L NC.  0 cc output from KELLY drain.  Patient feels well overall.  No new complaints.  Plan for repeat CT scan 4/2/2025 or sooner if drain output is consistently less than 10 mL per 24 hours per  IR recs 3/29.  If drain output remains similar today, consider CT scan tomorrow.  He finished his amoxicillin course, total of 7 days post source control.  Monitor off antibiotics.  I discussed POC with son who is at bedside.    I have discussed this patient's plan of care and discharge plan at IDT rounds today with Case Management, Nursing, Nursing leadership, and other members of the IDT team.    Consultants/Specialty  General surgery    Code Status  Full Code    Disposition    I have placed the appropriate orders for post-discharge needs.    Review of Systems   All 12 systems were reviewed and negative except as mentioned above      Physical Exam  Temp:  [36.7 °C (98.1 °F)-36.9 °C (98.4 °F)] 36.9 °C (98.4 °F)  Pulse:  [71-83] 76  Resp:  [12-18] 18  BP: (102-123)/(65-78) 123/70  SpO2:  [91 %-94 %] 94 %    Physical Exam  Constitutional:       General: He is not in acute distress.     Appearance: He is not ill-appearing.   Cardiovascular:      Rate and Rhythm: Normal rate and regular rhythm.      Pulses: Normal pulses.      Heart sounds: Normal heart sounds.   Pulmonary:      Effort: No respiratory distress.      Breath sounds: No wheezing.   Abdominal:      General: Bowel sounds are normal.      Palpations: Abdomen is soft.      Tenderness: There is abdominal tenderness. There is no guarding.      Comments: KELLY drain in place.  Mild abdominal pain.   Musculoskeletal:         General: No swelling or tenderness.      Cervical back: Normal range of motion and neck supple.      Right lower leg: No edema.      Left lower leg: No edema.   Skin:     General: Skin is warm.   Neurological:      Mental Status: He is alert and oriented to person, place, and time. Mental status is at baseline.         Fluids    Intake/Output Summary (Last 24 hours) at 3/31/2025 1749  Last data filed at 3/31/2025 1515  Gross per 24 hour   Intake 720 ml   Output 0 ml   Net 720 ml        Laboratory  Recent Labs     03/29/25  0954   WBC 9.0   RBC  4.94   HEMOGLOBIN 13.5*   HEMATOCRIT 42.5   MCV 86.0   MCH 27.3   MCHC 31.8*   RDW 46.9   PLATELETCT 463*   MPV 9.7     Recent Labs     03/29/25  0954   SODIUM 137   POTASSIUM 4.5   CHLORIDE 98   CO2 29   GLUCOSE 98   BUN 17   CREATININE 0.76   CALCIUM 8.9                     Imaging  CT-ABDOMEN-PELVIS WITH   Final Result      1.  Decreased gallbladder fossa fluid collection status post percutaneous drain placement now measuring 3.9 x 5.7 cm, previously 10.2 x 6.8 cm.   2.  Small right effusion, partially visualized.   3.  Bilateral lower lobe atelectasis and/or consolidation, underlying infection is possible.   4.  Unchanged trace perihepatic fluid adjacent to the dome of the liver.   5.  Status post cholecystectomy.   6.  Small fat-containing bilateral internal hernias.   7.  Enlarged prostate.   8.  Atherosclerosis.      DX-CHEST-PORTABLE (1 VIEW)   Final Result      Findings suggestive of pulmonary edema with elevation of the right hemidiaphragm and bibasilar atelectasis/infiltrate.      NM-HEPATOBILIARY SCAN   Final Result      No scintigraphy evidence of bile leak during the 60 minute imaging.      CT-IMAGE-GUIDED DRAIN PERITONEAL   Final Result      1.  CT GUIDED PLACEMENT OF A PERCUTANEOUS DRAINAGE CATHETER IN A GALLBLADDER FOSSA FLUID COLLECTION.   2.  THE CURRENT PLAN IS TO MONITOR DRAINAGE OUTPUT AND OBTAIN A FOLLOWUP CT SCAN IN 5-7 DAYS IF CLINICALLY INDICATED.           Assessment/Plan  * Gallbladder abscess- (present on admission)  Assessment & Plan  Patient noted to have a 10 cm fluid collection at the gallbladder fossa  Recent cholecystitis status post cholecystectomy  General surgery was consulted, recommends evaluation for bile leak, IR consult for drainage of fluid.    S/p HIDA 3/22, negative for bile leak.  S/p  IR drain 3/22, output of 5 cc  Gallbladder cultures positive for Enterococcus faecalis, pansensitive.  DC IV Zosyn - switch to amoxicillin    3/27  Repeat CT A/P showed decreased  gallbladder fossa fluid-filled collection s/p percutaneous drain placement.    3/31  Plan for repeat CT scan 4/2/2025 or sooner if drain output is consistently less than 10 mL per 24 hours per IR recs 3/29.  If drain output remains similar today, consider CT scan tomorrow.  S/p amoxicillin.  Monitor off antibiotics.  Continue multimodal pain regimen.    Bilateral inguinal hernia without obstruction or gangrene  Assessment & Plan  Seen on imaging CT.     S/P cholecystectomy  Assessment & Plan  Per history     Abnormal finding on lung imaging  Assessment & Plan  CT repeat 3/27 read with bilateral lower lobe atelectasis and/or consolidation, underlying infection is possible.  Procalcitonin WNL and Pt asymptomatic. Less suspicion for PNA. Continue to monitor.     Enlarged prostate  Assessment & Plan  Seen on imaging    Circulating anticoagulant disorder (HCC)  Assessment & Plan  On Eliquis at home for history of DVT.    Acute on chronic respiratory failure with hypoxia (HCC)  Assessment & Plan  Patient on 3 L at baseline at home.  Initially required 6 L at the admission  In the setting of possible CHF exacerbation.  S/p IV Lasix.  Switch to home p.o. Lasix dose, 40 mg once daily.    3/31  Stable on 3L.  Continue p.o. Lasix 40 mg once daily.      History of pulmonary embolism  Assessment & Plan  Continue home Eliquis.    ACP (advance care planning)  Assessment & Plan  Was discussed by previous provider, full code    SIRS (systemic inflammatory response syndrome) ([facility])  Assessment & Plan  SIRS criteria identified on my evaluation include:  Tachycardia, with heart rate greater than 90 BPM and Leukocytosis, with WBC greater than 12,000  Improved    DVT (deep venous thrombosis) ([facility])  Assessment & Plan  Continue on home Eliquis.  CTPA at outside hospital negative for pulmonary embolism    Acute on chronic systolic congestive heart failure (HCC)  Assessment & Plan  History of heart failure with ejection fraction 45%  Noted to  have lower extremity swelling and small pleural effusion seen on CAT scan   Bnp 986 on admission.  S/p IV Lasix  Continue home p.o. Lasix.  Back to baseline home 3L NC.         VTE prophylaxis: Eliquis    I have performed a physical exam and reviewed and updated ROS and Plan today (3/31/2025). In review of yesterday's note (3/30/2025), there are no changes except as documented above.

## 2025-03-31 NOTE — PROGRESS NOTES
Virtual Nurse rounding complete using  services ID#[id number].    Round Needs: No needs at this time.

## 2025-03-31 NOTE — PROGRESS NOTES
Assumed care of patient at 1900. Bedside report received. Assessment complete.    A&O x4. Patient calls appropriately.  Reports 8/10 pain. Pain managed with prescribed medications per MAR.  Denies SOB. O2 sats >90% on 3L.  Tolerating regular diet. Denies N/V.  Hyperactive bowel sounds. + flatus. Last BM 3/28.  RUQ IR drain with brown output.  Skin per flowsheets.  Mobility: SBA    Reviewed plan of care with patient. All needs met at this time. Call light and belongings within reach. Fall precautions in place. Hourly rounding in place.

## 2025-03-31 NOTE — PROGRESS NOTES
AA&Ox4. Denies CP/SOB.  Reporting 5/10 pain. Medicated per MAR.   Educated patient regarding pharmacologic and non pharmacologic modalities for pain management.  Skin per flowsheet.  Tolerating cardiac diet. Denies N/V.  + void. Last BM 3/31.  Pt ambulates SB assist.  All needs met at this time. Call light within reach. Pt calls appropriately. Bed low and locked, non skid socks in place. Hourly rounding in place.

## 2025-03-31 NOTE — CARE PLAN
Problem: Knowledge Deficit - Standard  Goal: Patient and family/care givers will demonstrate understanding of plan of care, disease process/condition, diagnostic tests and medications  Outcome: Progressing     Problem: Pain - Standard  Goal: Alleviation of pain or a reduction in pain to the patient’s comfort goal  Outcome: Progressing     Problem: Mobility  Goal: Patient's capacity to carry out activities will improve  Outcome: Progressing     The patient is Stable - Low risk of patient condition declining or worsening    Shift Goals  Clinical Goals: IR drain mgmt, mobilize  Patient Goals: pain control  Family Goals: support patient    Progress made toward(s) clinical / shift goals:  IR drain patent with reddish brown output, flushed per orders. Pain managed per MAR with reported relief. Patient mobilized OOB to bathroom and hallway.    Patient is not progressing towards the following goals:

## 2025-04-01 LAB
ANION GAP SERPL CALC-SCNC: 12 MMOL/L (ref 7–16)
BUN SERPL-MCNC: 15 MG/DL (ref 8–22)
CALCIUM SERPL-MCNC: 9 MG/DL (ref 8.5–10.5)
CHLORIDE SERPL-SCNC: 101 MMOL/L (ref 96–112)
CO2 SERPL-SCNC: 25 MMOL/L (ref 20–33)
CREAT SERPL-MCNC: 0.63 MG/DL (ref 0.5–1.4)
GFR SERPLBLD CREATININE-BSD FMLA CKD-EPI: 100 ML/MIN/1.73 M 2
GLUCOSE SERPL-MCNC: 94 MG/DL (ref 65–99)
POTASSIUM SERPL-SCNC: 4.1 MMOL/L (ref 3.6–5.5)
SODIUM SERPL-SCNC: 138 MMOL/L (ref 135–145)

## 2025-04-01 PROCEDURE — 700102 HCHG RX REV CODE 250 W/ 637 OVERRIDE(OP)

## 2025-04-01 PROCEDURE — A9270 NON-COVERED ITEM OR SERVICE: HCPCS | Performed by: STUDENT IN AN ORGANIZED HEALTH CARE EDUCATION/TRAINING PROGRAM

## 2025-04-01 PROCEDURE — 36415 COLL VENOUS BLD VENIPUNCTURE: CPT

## 2025-04-01 PROCEDURE — 99232 SBSQ HOSP IP/OBS MODERATE 35: CPT | Performed by: INTERNAL MEDICINE

## 2025-04-01 PROCEDURE — 700102 HCHG RX REV CODE 250 W/ 637 OVERRIDE(OP): Performed by: STUDENT IN AN ORGANIZED HEALTH CARE EDUCATION/TRAINING PROGRAM

## 2025-04-01 PROCEDURE — A9270 NON-COVERED ITEM OR SERVICE: HCPCS

## 2025-04-01 PROCEDURE — 700101 HCHG RX REV CODE 250

## 2025-04-01 PROCEDURE — 80048 BASIC METABOLIC PNL TOTAL CA: CPT

## 2025-04-01 PROCEDURE — 770001 HCHG ROOM/CARE - MED/SURG/GYN PRIV*

## 2025-04-01 RX ADMIN — CARVEDILOL 6.25 MG: 6.25 TABLET, FILM COATED ORAL at 07:53

## 2025-04-01 RX ADMIN — LIDOCAINE 1 PATCH: 4 PATCH TOPICAL at 07:52

## 2025-04-01 RX ADMIN — OXYCODONE 5 MG: 5 TABLET ORAL at 21:24

## 2025-04-01 RX ADMIN — FUROSEMIDE 40 MG: 20 TABLET ORAL at 04:22

## 2025-04-01 RX ADMIN — APIXABAN 5 MG: 5 TABLET, FILM COATED ORAL at 04:24

## 2025-04-01 RX ADMIN — METHOCARBAMOL 750 MG: 750 TABLET ORAL at 16:35

## 2025-04-01 RX ADMIN — METHOCARBAMOL 750 MG: 750 TABLET ORAL at 21:25

## 2025-04-01 RX ADMIN — METHOCARBAMOL 750 MG: 750 TABLET ORAL at 11:48

## 2025-04-01 RX ADMIN — APIXABAN 5 MG: 5 TABLET, FILM COATED ORAL at 16:35

## 2025-04-01 RX ADMIN — METHOCARBAMOL 750 MG: 750 TABLET ORAL at 07:52

## 2025-04-01 ASSESSMENT — ENCOUNTER SYMPTOMS
VOMITING: 0
WEAKNESS: 0
CONSTIPATION: 0
SHORTNESS OF BREATH: 0
NAUSEA: 0
DIARRHEA: 0
FEVER: 0
ABDOMINAL PAIN: 1
COUGH: 0
HEADACHES: 0
MYALGIAS: 0
CHILLS: 0
PSYCHIATRIC NEGATIVE: 1

## 2025-04-01 ASSESSMENT — PAIN DESCRIPTION - PAIN TYPE
TYPE: ACUTE PAIN

## 2025-04-01 NOTE — PROGRESS NOTES
Hospital Medicine Daily Progress Note    Date of Service  4/1/2025    Chief Complaint  Eduardo Donis is a 75 y.o. male admitted 3/21/2025 with abd pain    Hospital Course  76 yo man with CHF, PE on Eliquis, recent cholecystectomy who presented with abdominal pain and CT showed postsurgical abscess.  He was started on IV Zosyn.  Surgery was consulted and had IR place a drain.  HIDA scan was negative for a bile leak.  Fluid culture grew Enterococcus faecalis and he was transitioned to amoxicillin.    Interval Problem Update  Drain with 20cc overnight.   used. He has some pain around his drain, otherwise doing well.      I have discussed this patient's plan of care and discharge plan at IDT rounds today with Case Management, Nursing, Nursing leadership, and other members of the IDT team.    Consultants/Specialty  general surgery and IR    Code Status  Full Code    Disposition  The patient is not medically cleared for discharge to home or a post-acute facility.  Anticipate discharge to: home with close outpatient follow-up    I have placed the appropriate orders for post-discharge needs.    Review of Systems  Review of Systems   Constitutional:  Negative for malaise/fatigue.   Gastrointestinal:  Positive for abdominal pain. Negative for diarrhea, nausea and vomiting.   Neurological:  Negative for weakness.        Physical Exam  Temp:  [36.6 °C (97.9 °F)-37.1 °C (98.8 °F)] 36.7 °C (98.1 °F)  Pulse:  [73-80] 76  Resp:  [15-18] 16  BP: ()/(59-79) 125/79  SpO2:  [90 %-94 %] 94 %    Physical Exam  Vitals and nursing note reviewed.   Constitutional:       General: He is not in acute distress.     Appearance: He is not toxic-appearing.   HENT:      Head: Normocephalic.      Mouth/Throat:      Mouth: Mucous membranes are moist.   Eyes:      General:         Right eye: No discharge.         Left eye: No discharge.   Cardiovascular:      Rate and Rhythm: Normal rate and regular rhythm.    Pulmonary:      Effort: Pulmonary effort is normal. No respiratory distress.      Breath sounds: No wheezing or rales.   Abdominal:      Palpations: Abdomen is soft.      Tenderness: There is abdominal tenderness (Right side at drain site). There is no guarding or rebound.      Comments: Drain in place   Musculoskeletal:         General: No swelling.      Cervical back: Neck supple.   Skin:     General: Skin is warm and dry.   Neurological:      Mental Status: He is alert and oriented to person, place, and time.         Fluids    Intake/Output Summary (Last 24 hours) at 4/1/2025 1331  Last data filed at 4/1/2025 1138  Gross per 24 hour   Intake 1220 ml   Output 20 ml   Net 1200 ml        Laboratory        Recent Labs     04/01/25  0347   SODIUM 138   POTASSIUM 4.1   CHLORIDE 101   CO2 25   GLUCOSE 94   BUN 15   CREATININE 0.63   CALCIUM 9.0                   Imaging  CT-ABDOMEN-PELVIS WITH   Final Result      1.  Decreased gallbladder fossa fluid collection status post percutaneous drain placement now measuring 3.9 x 5.7 cm, previously 10.2 x 6.8 cm.   2.  Small right effusion, partially visualized.   3.  Bilateral lower lobe atelectasis and/or consolidation, underlying infection is possible.   4.  Unchanged trace perihepatic fluid adjacent to the dome of the liver.   5.  Status post cholecystectomy.   6.  Small fat-containing bilateral internal hernias.   7.  Enlarged prostate.   8.  Atherosclerosis.      DX-CHEST-PORTABLE (1 VIEW)   Final Result      Findings suggestive of pulmonary edema with elevation of the right hemidiaphragm and bibasilar atelectasis/infiltrate.      NM-HEPATOBILIARY SCAN   Final Result      No scintigraphy evidence of bile leak during the 60 minute imaging.      CT-IMAGE-GUIDED DRAIN PERITONEAL   Final Result      1.  CT GUIDED PLACEMENT OF A PERCUTANEOUS DRAINAGE CATHETER IN A GALLBLADDER FOSSA FLUID COLLECTION.   2.  THE CURRENT PLAN IS TO MONITOR DRAINAGE OUTPUT AND OBTAIN A FOLLOWUP CT  SCAN IN 5-7 DAYS IF CLINICALLY INDICATED.           Assessment/Plan  * Gallbladder abscess- (present on admission)  Assessment & Plan  CT showed postsurgical abscess.  He was started on IV Zosyn.  Surgery was consulted and had IR place a drain.  HIDA scan was negative for a bile leak.  Fluid culture grew Enterococcus faecalis and he was transitioned from Zosyn to amoxicillin which she finished on 3/30 for 10 days course  Plan for repeat CT scan 4/2/2025  IR and surgery following    Bilateral inguinal hernia without obstruction or gangrene  Assessment & Plan  Seen on imaging CT.     S/P cholecystectomy  Assessment & Plan  Per history     Abnormal finding on lung imaging  Assessment & Plan  CT repeat 3/27 read with bilateral lower lobe atelectasis and/or consolidation, underlying infection is possible.  Procalcitonin WNL and Pt asymptomatic. Less suspicion for PNA. Continue to monitor.     Enlarged prostate  Assessment & Plan  Seen on imaging    Circulating anticoagulant disorder (HCC)  Assessment & Plan  On Eliquis at home for history of DVT.    Acute on chronic respiratory failure with hypoxia (HCC)  Assessment & Plan  Patient on 3 L at baseline at home.  Initially required 6 L at the admission  In the setting of possible CHF exacerbation.  S/p IV Lasix.  Switch to home p.o. Lasix dose, 40 mg once daily.  On 1-3 L O2      History of pulmonary embolism  Assessment & Plan  Continue home Eliquis.    ACP (advance care planning)  Assessment & Plan  Was discussed by previous provider, full code    SIRS (systemic inflammatory response syndrome) ([facility])  Assessment & Plan  SIRS criteria identified on my evaluation include:  Tachycardia, with heart rate greater than 90 BPM and Leukocytosis, with WBC greater than 12,000  Improved    DVT (deep venous thrombosis) ([facility])  Assessment & Plan  Continue on home Eliquis.  CTPA at outside hospital negative for pulmonary embolism    Acute on chronic systolic congestive heart failure  ([facility])  Assessment & Plan  History of heart failure with ejection fraction 45%  Noted to have lower extremity swelling and small pleural effusion seen on CAT scan   Bnp 986 on admission.  S/p IV Lasix  Continue home p.o. Lasix.  Back to baseline home 3L NC.         VTE prophylaxis:    therapeutic anticoagulation with eliquis 5 mg BID      I have performed a physical exam and reviewed and updated ROS and Plan today (4/1/2025). In review of yesterday's note (3/31/2025), there are no changes except as documented above.

## 2025-04-01 NOTE — CARE PLAN
The patient is Stable - Low risk of patient condition declining or worsening    Shift Goals  Clinical Goals: drain management; pain control  Patient Goals: pain control; comfort  Family Goals: support patient    Progress made toward(s) clinical / shift goals:       Patient is not progressing towards the following goals:

## 2025-04-01 NOTE — CARE PLAN
The patient is Stable - Low risk of patient condition declining or worsening    Shift Goals  Clinical Goals: pain mgmt  Patient Goals: pain mgmt  Family Goals: support patient    Progress made toward(s) clinical / shift goals:    Problem: Knowledge Deficit - Standard  Goal: Patient and family/care givers will demonstrate understanding of plan of care, disease process/condition, diagnostic tests and medications  Outcome: Progressing   Educate patient on plan of care, and encourage pt to ask questions.

## 2025-04-01 NOTE — PROGRESS NOTES
Virtual Nurse rounding complete with use of  services ID#[id number].    Round Needs: No needs at this time.

## 2025-04-01 NOTE — PROGRESS NOTES
Report received from day shift RN, assumed Care.   Patient is AOx4, responds appropriately.      Pain controlled at this time.  Patient is tolerating cardiac diet, denies nausea/vomiting. + flatus  Up stand by assist with steady gait.    Plan of care discussed, all questions answered.    Educated on use of call light and importance of calling before getting out of bed. Pt verbalizes understanding.    Call light and belongings within reach, treaded slipper socks on, bed in lowest locked position.  All needs met at this time.

## 2025-04-01 NOTE — CARE PLAN
The patient is Stable - Low risk of patient condition declining or worsening    Shift Goals  Clinical Goals: pain control, oob activity  Patient Goals: pain mgmt  Family Goals: support patient    Progress made toward(s) clinical / shift goals:  Pain managed per scheduled medication. Patient sitting up at edge of bed for all meals.      Problem: Knowledge Deficit - Standard  Goal: Patient and family/care givers will demonstrate understanding of plan of care, disease process/condition, diagnostic tests and medications  Description: Target End Date:  1-3 days or as soon as patient condition allowsDocument in Patient Education1.  Patient and family/caregiver oriented to unit, equipment, visitation policy and means for communicating concern2.  Complete/review Learning Assessment3.  Assess knowledge level of disease process/condition, treatment plan, diagnostic tests and medications4.  Explain disease process/condition, treatment plan, diagnostic tests and medications  Outcome: Progressing     Problem: Respiratory  Goal: Patient will achieve/maintain optimum respiratory ventilation and gas exchange  Description: Target End Date:  Prior to discharge or change in level of careDocument on Assessment flowsheet1.  Assess and monitor rate, rhythm, depth and effort of respiration2.  Breath sounds assessed qshift and/or as needed3.  Assess O2 saturation, administer/titrate oxygen as ordered4.  Position patient for maximum ventilatory efficiency5.  Turn, cough, and deep breath with splinting to improve effectiveness6.  Collaborate with RT to administer medication/treatments per order7.  Encourage use of incentive spirometer and encourage patient to cough after use and utilize splinting techniques if applicable8.  Airway suctioning9.  Monitor sputum production for changes in color, consistency and [id number]. Perform frequent oral [id number]. Alternate physical activity with rest periods  Outcome: Progressing     Problem: Pain -  Standard  Goal: Alleviation of pain or a reduction in pain to the patient’s comfort goal  Description: Target End Date:  Prior to discharge or change in level of careDocument on Vitals flowsheet1.  Document pain using the appropriate pain scale per order or unit policy2.  Educate and implement non-pharmacologic comfort measures (i.e. relaxation, distraction, massage, cold/heat therapy, etc.)3.  Pain management medications as ordered4.  Reassess pain after pain med administration per policy5.  If opiods administered assess patient's response to pain medication is appropriate per POSS sedation scale6.  Follow pain management plan developed in collaboration with patient and interdisciplinary team (including palliative care or pain specialists if applicable)  Outcome: Progressing     Problem: Fall Risk  Goal: Patient will remain free from falls  Description: Target End Date:  Prior to discharge or change in level of careDocument interventions on the [facility] Fall Risk Assessment1.  Assess for fall risk factors2.  Implement fall precautions  Outcome: Progressing

## 2025-04-01 NOTE — PROGRESS NOTES
Radiology Progress Note     Author: Kathia Grover DNP Date & Time created: 4/1/2025  4:33 PM   Date of admission  3/21/2025  Note to reader: this note follows the APSO format rather than the historical SOAP format. Assessment and plan located at the top of the note for ease of use.    Chief Complaint  75 y.o. male admitted 3/21/2025 with abdominal pain    HPI  Eduardo Donis is a 75-year-old male with PMH significant for HFrEF, PE (on Eliquis), morbid obesity, and recent cholecystitis s/p cholecystectomy (03/13/25) transferred from outside facility for CT finding of gallbladder fossa fluid collection after presenting with continued abdominal pain.  IR was consulted and patient underwent CT-guided gallbladder fossa fluid collection drain with IR Dr. Mooney on 03/22/2025. 80 mL old, cloudy, bloody fluid was drained at time of drain placement    Interval History:   03/23/2025 - GB fossa drain to RUQ with no output in the last 24 hours.  I flushed the drain with 10 mLs NS with moderate amount of maroon fluid returned in the suction bulb. Labs reviewed by me including WBC 16.1, H&H 12.4/37.5, creatinine 0.75.  GB fossa fluid cultures pending.  On ABX.  HIDA scan without evidence of biliary leak. Pt had many questions regarding plan of care and all questions answered to patient's satisfaction using  line.  I reviewed all IDT notes, reviewed all imaging, and discussed patient with bedside RN.    03/24/2025 - GB fossa drain to RUQ with 5 mL maroon output in the last 24 hours.  I flushed the drain with 10 mLs NS with instant return of moderate maroon output upon return to bulb suction. Labs reviewed by me including: WBC 13.8, H&H 12.5/39.3, creatinine 0.8. GB fossa fluid cultures positive for Enterococcus faecalis. On ABX. I reviewed all IDT notes, reviewed all imaging, and discussed patient with Hospitalist and hospital nursing staff.     03/25/2025 - GB fossa drain to RUQ with 30 mL dark maroon output in  "the last 24 hours. I flushed the drain with 10 mLs NS with instant return of moderate maroon output upon returning to bulb suction. Labs reviewed by me including: WBC 11.7, H&H 13.3/42.7, creatinine 0.78. GB fossa fluid cultures positive. On ABX. I reviewed all IDT notes and discussed patient care with Hospitalist.    03/26/2025 - GB fossa drain to RUQ with 30 mL dark maroon/brown output in the last 24 hours. I flushed the drain with 10 mLs NS with instant return of light maroon output upon returning to bulb suction. Labs reviewed by me including: WBC 11.9, H&H 12.9/42.3, creatinine 0.77. GB fossa fluid cultures positive. On ABX. I reviewed all IDT notes and discussed patient care with hospital nursing staff.    3/27/25 - CT abdomen/pelvis today shows: \"Decreased gallbladder fossa fluid collection status post percutaneous drain placement now measuring 3.9 x 5.7 cm, previously 10.2 x 6.8 cm\" GB fossa drain to RUQ with 33 mL dark maroon/brown output in the last 24 hours. I flushed the drain with 10 mLs NS with instant return of light maroon output upon returning to bulb suction. Labs reviewed by me including: WBC 11.5, H&H 13.3/42, creatinine 0.77. GB fossa fluid cultures positive. On ABX. I reviewed plan with patient at bedside.    3/28/25 - GB fossa drain to RUQ with 18 mL dark maroon/brown output in the last 24 hours. I flushed the drain with 10 mLs NS with instant return of maroon output upon returning to bulb suction. No new labs to review today. GB fossa fluid cultures positive. On ABX. I reviewed plan with patient at bedside and reviewed IDT notes. I discussed plan of care with Hospitalist.    3/29/25 - Suction bulb found to be missing port plug overnight, bulb replaced by hospital nursing staff; bulb holding suction upon my assessment today. GB fossa drain to RUQ with 10 mL dark maroon/brown output in the last 24 hours. Drain flushed by hospital nursing staff. Labs I reviewed today: WBC 9, H/H 13.5/42.5, Cr " 0.76. GB fossa fluid cultures positive. On ABX. I reviewed plan with patient at bedside, discussed patient with hospitalist, and reviewed IDT notes.    3/31/2025: GB fossa drain (12F) to bulb suction with less than 10 mL dark brown fluid reported output in the last 24 hours.  Irrigated drain with 20 mL sterile saline with improved and increased drain output (approximately 30 mL).  Patient is [facility] to [facility], reclining in bed, talking on his phone.  He reports continued abdominal pain, primarily in the area of drain.  He denies nausea or vomiting, and reports regular bowel movements.  I reviewed patient's most recent labs including WBC 9.0<11.5, Hgb 13.5, CR 0.76.  Coordinated post IR procedure care with patient and hospital nursing staff.    4/1/2025: GB fossa drain (12F) to bulb suction with 20 mL recorded and dark brown fluid output in the last 24 hours.  Irrigated drain with 20 mL sterile saline with 10 mL dark brown brisk aspirated return.  Patient is [facility] to [facility], reclining in hospital bed.  He states abdominal pain is improved.  He is without nausea or vomiting and continues to have regular bowel movements.  I reviewed patient's most recent labs including WBC 9.0<11.5, Hgb 13.5, CR 0.63.  Coordinated post IR procedure care with patient interventional radiologist, hospitalist, and hospital nursing staff.    Assessment/Plan     Principal Problem:    Gallbladder abscess  Active Problems:    Abdominal pain    Acute on chronic systolic congestive heart failure (HCC)    DVT (deep venous thrombosis) ([facility])    SIRS (systemic inflammatory response syndrome) ([facility])    ACP (advance care planning)    History of pulmonary embolism    SBO (small bowel obstruction) ([facility])    Acute on chronic respiratory failure with hypoxia ([facility])    Circulating anticoagulant disorder ([facility])    Enlarged prostate    Abnormal finding on lung imaging    S/P cholecystectomy    Bilateral inguinal hernia without obstruction or gangrene      Plan IR  -  Continue to irrigate GB fossa drain with 10 ml of sterile saline each shift  - GB fossa fluid cultures positive for Enterococcus faecalis  - Hospitalist, Gen Surg, and Urology following   - Pending CT scan in 5-7 days (~4/2/25)  - Continue to monitor drain output, vital signs, and labs  - IR will continue to follow while patient remains in the hospital    -Thank you for allowing Interventional Radiology team to participate in the patients care, if any additional care or requests are needed in the future please do not hesitate to call or place IR order.        Review of Systems  Physical Exam   Review of Systems   Constitutional:  Positive for malaise/fatigue. Negative for chills and fever.   Respiratory:  Negative for cough and shortness of breath.    Cardiovascular:  Negative for chest pain.   Gastrointestinal:  Positive for abdominal pain (At drain site- improving). Negative for constipation, nausea and vomiting.   Musculoskeletal:  Negative for myalgias.   Neurological:  Negative for weakness and headaches.   Psychiatric/Behavioral: Negative.        Vitals:    04/01/25 1605   BP: 111/76   Pulse: 81   Resp: 16   Temp: 36.9 °C (98.4 °F)   SpO2: 98%        Physical Exam  Vitals and nursing note reviewed.   Constitutional:       General: He is not in acute distress.     Appearance: Normal appearance. He is not ill-appearing.   HENT:      Head: Atraumatic.   Cardiovascular:      Rate and Rhythm: Normal rate.      Pulses: Normal pulses.   Pulmonary:      Effort: Pulmonary effort is normal. No respiratory distress.   Abdominal:      Tenderness: There is abdominal tenderness (mild at drain insertion site).      Comments: IR drain to RUQ   Skin:     General: Skin is warm and dry.      Coloration: Skin is not jaundiced or pale.   Neurological:      General: No focal deficit present.      Mental Status: He is alert and oriented to person, place, and time.   Psychiatric:         Mood and Affect: Mood normal.          "Behavior: Behavior normal.          Labs          Recent Labs     04/01/25  0347   SODIUM 138   POTASSIUM 4.1   CHLORIDE 101   CO2 25   GLUCOSE 94   BUN 15   CREATININE 0.63   CALCIUM 9.0     Recent Labs     04/01/25  0347   CREATININE 0.63     CT-ABDOMEN-PELVIS WITH   Final Result      1.  Decreased gallbladder fossa fluid collection status post percutaneous drain placement now measuring 3.9 x 5.7 cm, previously 10.2 x 6.8 cm.   2.  Small right effusion, partially visualized.   3.  Bilateral lower lobe atelectasis and/or consolidation, underlying infection is possible.   4.  Unchanged trace perihepatic fluid adjacent to the dome of the liver.   5.  Status post cholecystectomy.   6.  Small fat-containing bilateral internal hernias.   7.  Enlarged prostate.   8.  Atherosclerosis.      DX-CHEST-PORTABLE (1 VIEW)   Final Result      Findings suggestive of pulmonary edema with elevation of the right hemidiaphragm and bibasilar atelectasis/infiltrate.      NM-HEPATOBILIARY SCAN   Final Result      No scintigraphy evidence of bile leak during the 60 minute imaging.      CT-IMAGE-GUIDED DRAIN PERITONEAL   Final Result      1.  CT GUIDED PLACEMENT OF A PERCUTANEOUS DRAINAGE CATHETER IN A GALLBLADDER FOSSA FLUID COLLECTION.   2.  THE CURRENT PLAN IS TO MONITOR DRAINAGE OUTPUT AND OBTAIN A FOLLOWUP CT SCAN IN 5-7 DAYS IF CLINICALLY INDICATED.        INR   Date Value Ref Range Status   03/21/2025 1.09 0.87 - 1.13 Final     Comment:     INR - Non-therapeutic Reference Range: 0.87-1.13  INR - Therapeutic Reference Range: 2.0-4.0       No results found for: \"POCINR\"     Intake/Output Summary (Last 24 hours) at 3/23/2025 0758  Last data filed at 3/23/2025 0714  Gross per 24 hour   Intake 0 ml   Output 0 ml   Net 0 ml      I have personally reviewed the above labs and imaging      I have performed a physical exam and reviewed and updated ROS and Plan today (4/1/2025).     39 minutes in directly providing and coordinating care and " extensive data review.  No time overlap and excludes procedures.

## 2025-04-01 NOTE — PROGRESS NOTES
Report received from previous shift RN.  Assessment complete.  Patient resting in bed comfortably, even and unlabored breathing present.  RUQ IR drain to bulb suction, CDI.  All needs met at this time. Call light within reach. Hourly rounding in place.

## 2025-04-02 ENCOUNTER — APPOINTMENT (OUTPATIENT)
Dept: RADIOLOGY | Facility: MEDICAL CENTER | Age: 75
DRG: 862 | End: 2025-04-02
Payer: MEDICARE

## 2025-04-02 LAB
ALBUMIN SERPL BCP-MCNC: 3.4 G/DL (ref 3.2–4.9)
ALBUMIN/GLOB SERPL: 0.9 G/DL
ALP SERPL-CCNC: 103 U/L (ref 30–99)
ALT SERPL-CCNC: 7 U/L (ref 2–50)
ANION GAP SERPL CALC-SCNC: 10 MMOL/L (ref 7–16)
AST SERPL-CCNC: 25 U/L (ref 12–45)
BILIRUB SERPL-MCNC: 0.7 MG/DL (ref 0.1–1.5)
BUN SERPL-MCNC: 13 MG/DL (ref 8–22)
CALCIUM ALBUM COR SERPL-MCNC: 9.5 MG/DL (ref 8.5–10.5)
CALCIUM SERPL-MCNC: 9 MG/DL (ref 8.5–10.5)
CHLORIDE SERPL-SCNC: 101 MMOL/L (ref 96–112)
CO2 SERPL-SCNC: 28 MMOL/L (ref 20–33)
CREAT SERPL-MCNC: 0.81 MG/DL (ref 0.5–1.4)
ERYTHROCYTE [DISTWIDTH] IN BLOOD BY AUTOMATED COUNT: 47.2 FL (ref 35.9–50)
GFR SERPLBLD CREATININE-BSD FMLA CKD-EPI: 92 ML/MIN/1.73 M 2
GLOBULIN SER CALC-MCNC: 3.8 G/DL (ref 1.9–3.5)
GLUCOSE SERPL-MCNC: 92 MG/DL (ref 65–99)
HCT VFR BLD AUTO: 44.5 % (ref 42–52)
HGB BLD-MCNC: 14 G/DL (ref 14–18)
MCH RBC QN AUTO: 27.7 PG (ref 27–33)
MCHC RBC AUTO-ENTMCNC: 31.5 G/DL (ref 32.3–36.5)
MCV RBC AUTO: 88.1 FL (ref 81.4–97.8)
PLATELET # BLD AUTO: 308 K/UL (ref 164–446)
PMV BLD AUTO: 10.5 FL (ref 9–12.9)
POTASSIUM SERPL-SCNC: 4.2 MMOL/L (ref 3.6–5.5)
PROT SERPL-MCNC: 7.2 G/DL (ref 6–8.2)
RBC # BLD AUTO: 5.05 M/UL (ref 4.7–6.1)
SODIUM SERPL-SCNC: 139 MMOL/L (ref 135–145)
WBC # BLD AUTO: 8.1 K/UL (ref 4.8–10.8)

## 2025-04-02 PROCEDURE — 700117 HCHG RX CONTRAST REV CODE 255

## 2025-04-02 PROCEDURE — 770001 HCHG ROOM/CARE - MED/SURG/GYN PRIV*

## 2025-04-02 PROCEDURE — A9270 NON-COVERED ITEM OR SERVICE: HCPCS

## 2025-04-02 PROCEDURE — 99232 SBSQ HOSP IP/OBS MODERATE 35: CPT | Performed by: INTERNAL MEDICINE

## 2025-04-02 PROCEDURE — 85027 COMPLETE CBC AUTOMATED: CPT

## 2025-04-02 PROCEDURE — 700102 HCHG RX REV CODE 250 W/ 637 OVERRIDE(OP): Performed by: STUDENT IN AN ORGANIZED HEALTH CARE EDUCATION/TRAINING PROGRAM

## 2025-04-02 PROCEDURE — 700101 HCHG RX REV CODE 250

## 2025-04-02 PROCEDURE — 80053 COMPREHEN METABOLIC PANEL: CPT

## 2025-04-02 PROCEDURE — A9270 NON-COVERED ITEM OR SERVICE: HCPCS | Performed by: STUDENT IN AN ORGANIZED HEALTH CARE EDUCATION/TRAINING PROGRAM

## 2025-04-02 PROCEDURE — 74160 CT ABDOMEN W/CONTRAST: CPT

## 2025-04-02 PROCEDURE — 700102 HCHG RX REV CODE 250 W/ 637 OVERRIDE(OP)

## 2025-04-02 RX ORDER — ETHYL ALCOHOL 62 %
1 SWAB, MEDICATED TOPICAL 2 TIMES DAILY
Status: DISCONTINUED | OUTPATIENT
Start: 2025-04-02 | End: 2025-04-03 | Stop reason: HOSPADM

## 2025-04-02 RX ADMIN — IOHEXOL 100 ML: 350 INJECTION, SOLUTION INTRAVENOUS at 04:05

## 2025-04-02 RX ADMIN — OXYCODONE 5 MG: 5 TABLET ORAL at 18:23

## 2025-04-02 RX ADMIN — Medication 1 APPLICATOR: at 16:50

## 2025-04-02 RX ADMIN — OXYCODONE 5 MG: 5 TABLET ORAL at 11:46

## 2025-04-02 RX ADMIN — FUROSEMIDE 40 MG: 20 TABLET ORAL at 04:24

## 2025-04-02 RX ADMIN — METHOCARBAMOL 750 MG: 750 TABLET ORAL at 20:03

## 2025-04-02 RX ADMIN — METHOCARBAMOL 750 MG: 750 TABLET ORAL at 16:50

## 2025-04-02 RX ADMIN — METHOCARBAMOL 750 MG: 750 TABLET ORAL at 07:35

## 2025-04-02 RX ADMIN — APIXABAN 5 MG: 5 TABLET, FILM COATED ORAL at 16:50

## 2025-04-02 RX ADMIN — METHOCARBAMOL 750 MG: 750 TABLET ORAL at 11:46

## 2025-04-02 RX ADMIN — APIXABAN 5 MG: 5 TABLET, FILM COATED ORAL at 04:26

## 2025-04-02 RX ADMIN — OXYCODONE 5 MG: 5 TABLET ORAL at 04:25

## 2025-04-02 RX ADMIN — Medication 1 APPLICATOR: at 07:47

## 2025-04-02 RX ADMIN — LIDOCAINE 1 PATCH: 4 PATCH TOPICAL at 07:35

## 2025-04-02 ASSESSMENT — PAIN DESCRIPTION - PAIN TYPE
TYPE: ACUTE PAIN

## 2025-04-02 ASSESSMENT — ENCOUNTER SYMPTOMS
CONSTIPATION: 0
CHILLS: 0
VOMITING: 0
PSYCHIATRIC NEGATIVE: 1
NAUSEA: 0
ABDOMINAL PAIN: 1
MYALGIAS: 0
WEAKNESS: 0
HEADACHES: 0
FEVER: 0
ABDOMINAL PAIN: 0
COUGH: 0
SHORTNESS OF BREATH: 0

## 2025-04-02 NOTE — PROGRESS NOTES
Bedside report received.  Assessment complete.  A&O x 4. Patient calls appropriately.  Patient ambulates with standby assist. Bed alarm n/a.   Patient has 7/10 pain. Patient medicated per MAR.  Denies N&V. Tolerating cardiac diet.  Lap sites CDI RUQ KELLY DIP CDI  + void, + flatus, - BM.  Patient denies SOB.  SCD's refused.  Patient calm and cooperative with POC.  Review plan with of care with patient. Call light and personal belongings within reach. Hourly rounding in place. All needs met at this time.

## 2025-04-02 NOTE — PROGRESS NOTES
Hospital Medicine Daily Progress Note    Date of Service  4/2/2025    Chief Complaint  Eduardo Donis is a 75 y.o. male admitted 3/21/2025 with abd pain    Hospital Course  74 yo man with CHF, PE on Eliquis, recent cholecystectomy who presented with abdominal pain and CT showed postsurgical abscess.  He was started on IV Zosyn.  Surgery was consulted and had IR place a drain.  HIDA scan was negative for a bile leak.  Fluid culture grew Enterococcus faecalis and he was transitioned to amoxicillin.    Interval Problem Update   [id number]  Drained with 0 cc overnight.  CT AP showed decrease size of abscess.  Patient reports no new changes, feeling well.  I discussed with IR and continue drain for 1 more day and will remove tomorrow.    I have discussed this patient's plan of care and discharge plan at IDT rounds today with Case Management, Nursing, Nursing leadership, and other members of the IDT team.    Consultants/Specialty  general surgery and IR    Code Status  Full Code    Disposition  The patient is not medically cleared for discharge to home or a post-acute facility.  Anticipate discharge to: home with close outpatient follow-up    I have placed the appropriate orders for post-discharge needs.    Review of Systems  Review of Systems   Constitutional:  Negative for malaise/fatigue.   Gastrointestinal:  Positive for abdominal pain. Negative for nausea and vomiting.   Neurological:  Negative for weakness.        Physical Exam  Temp:  [36.4 °C (97.5 °F)-37 °C (98.6 °F)] 36.4 °C (97.5 °F)  Pulse:  [69-81] 75  Resp:  [15-17] 17  BP: (109-121)/(65-84) 109/65  SpO2:  [93 %-98 %] 93 %    Physical Exam  Vitals and nursing note reviewed.   Constitutional:       General: He is not in acute distress.     Appearance: He is not toxic-appearing.   HENT:      Head: Normocephalic.      Mouth/Throat:      Mouth: Mucous membranes are moist.   Eyes:      General:         Right eye: No discharge.         Left  eye: No discharge.   Pulmonary:      Effort: No respiratory distress.   Abdominal:      Palpations: Abdomen is soft.      Tenderness: There is abdominal tenderness (Right side at drain site). There is no guarding or rebound.      Comments: Drain in place   Musculoskeletal:         General: No swelling.      Cervical back: Neck supple.   Skin:     General: Skin is warm and dry.   Neurological:      Mental Status: He is alert and oriented to person, place, and time.         Fluids    Intake/Output Summary (Last 24 hours) at 4/2/2025 1115  Last data filed at 4/2/2025 0338  Gross per 24 hour   Intake 270 ml   Output 15 ml   Net 255 ml        Laboratory  Recent Labs     04/02/25  0707   WBC 8.1   RBC 5.05   HEMOGLOBIN 14.0   HEMATOCRIT 44.5   MCV 88.1   MCH 27.7   MCHC 31.5*   RDW 47.2   PLATELETCT 308   MPV 10.5       Recent Labs     04/01/25  0347 04/02/25  0707   SODIUM 138 139   POTASSIUM 4.1 4.2   CHLORIDE 101 101   CO2 25 28   GLUCOSE 94 92   BUN 15 13   CREATININE 0.63 0.81   CALCIUM 9.0 9.0                   Imaging  CT-ABDOMEN WITH   Final Result         1.  Small residual low-density fluid collection in gallbladder fossa with pigtail drain in place, decreased since prior study.   2.  Small layering right pleural effusion   3.  Linear density in the right lung base favors changes of atelectasis component of infiltrate not excluded   4.  Irregular hepatic contour favoring changes of cirrhosis   5.  Minimal atherosclerosis      CT-ABDOMEN-PELVIS WITH   Final Result      1.  Decreased gallbladder fossa fluid collection status post percutaneous drain placement now measuring 3.9 x 5.7 cm, previously 10.2 x 6.8 cm.   2.  Small right effusion, partially visualized.   3.  Bilateral lower lobe atelectasis and/or consolidation, underlying infection is possible.   4.  Unchanged trace perihepatic fluid adjacent to the dome of the liver.   5.  Status post cholecystectomy.   6.  Small fat-containing bilateral internal  hernias.   7.  Enlarged prostate.   8.  Atherosclerosis.      DX-CHEST-PORTABLE (1 VIEW)   Final Result      Findings suggestive of pulmonary edema with elevation of the right hemidiaphragm and bibasilar atelectasis/infiltrate.      NM-HEPATOBILIARY SCAN   Final Result      No scintigraphy evidence of bile leak during the 60 minute imaging.      CT-IMAGE-GUIDED DRAIN PERITONEAL   Final Result      1.  CT GUIDED PLACEMENT OF A PERCUTANEOUS DRAINAGE CATHETER IN A GALLBLADDER FOSSA FLUID COLLECTION.   2.  THE CURRENT PLAN IS TO MONITOR DRAINAGE OUTPUT AND OBTAIN A FOLLOWUP CT SCAN IN 5-7 DAYS IF CLINICALLY INDICATED.           Assessment/Plan  * Gallbladder abscess- (present on admission)  Assessment & Plan  CT showed postsurgical abscess.  He was started on IV Zosyn.  Surgery was consulted and had IR place a drain.  HIDA scan was negative for a bile leak.  Fluid culture grew Enterococcus faecalis and he was transitioned from Zosyn to amoxicillin which she finished on 3/30 for 10 days course  Plan for repeat CT scan 4/2/2025  Per IR, will remove drain tomorrow    Bilateral inguinal hernia without obstruction or gangrene  Assessment & Plan  Seen on imaging CT.     S/P cholecystectomy  Assessment & Plan  Per history     Abnormal finding on lung imaging  Assessment & Plan  CT repeat 3/27 read with bilateral lower lobe atelectasis and/or consolidation, underlying infection is possible.  Procalcitonin WNL and Pt asymptomatic. Less suspicion for PNA. Continue to monitor.     Enlarged prostate  Assessment & Plan  Seen on imaging    Circulating anticoagulant disorder (HCC)  Assessment & Plan  On Eliquis at home for history of DVT.    Acute on chronic respiratory failure with hypoxia (HCC)  Assessment & Plan  Patient on 3 L at baseline at home.  Initially required 6 L at the admission  In the setting of possible CHF exacerbation.  S/p IV Lasix.  Switch to home p.o. Lasix dose, 40 mg once daily.  On 1-3 L O2      History of  pulmonary embolism  Assessment & Plan  Continue home Eliquis.    ACP (advance care planning)  Assessment & Plan  Was discussed by previous provider, full code    SIRS (systemic inflammatory response syndrome) ([facility])  Assessment & Plan  SIRS criteria identified on my evaluation include:  Tachycardia, with heart rate greater than 90 BPM and Leukocytosis, with WBC greater than 12,000  Improved    DVT (deep venous thrombosis) ([facility])  Assessment & Plan  Continue on home Eliquis.  CTPA at outside hospital negative for pulmonary embolism    Acute on chronic systolic congestive heart failure ([facility])  Assessment & Plan  History of heart failure with ejection fraction 45%  Noted to have lower extremity swelling and small pleural effusion seen on CAT scan   Bnp 986 on admission.  S/p IV Lasix  Continue home p.o. Lasix.  Back to baseline home 3L NC.         VTE prophylaxis:    therapeutic anticoagulation with eliquis 5 mg BID      I have performed a physical exam and reviewed and updated ROS and Plan today (4/2/2025). In review of yesterday's note (4/1/2025), there are no changes except as documented above.

## 2025-04-02 NOTE — CARE PLAN
The patient is Stable - Low risk of patient condition declining or worsening    Shift Goals  Clinical Goals: pain control, oob activity  Patient Goals: pain control, rest  Family Goals: APOLINAR    Progress made toward(s) clinical / shift goals:  Pain managed with non-pharmacological pain modalities.       Problem: Knowledge Deficit - Standard  Goal: Patient and family/care givers will demonstrate understanding of plan of care, disease process/condition, diagnostic tests and medications  Description: Target End Date:  1-3 days or as soon as patient condition allowsDocument in Patient Education1.  Patient and family/caregiver oriented to unit, equipment, visitation policy and means for communicating concern2.  Complete/review Learning Assessment3.  Assess knowledge level of disease process/condition, treatment plan, diagnostic tests and medications4.  Explain disease process/condition, treatment plan, diagnostic tests and medications  Outcome: Progressing     Problem: Respiratory  Goal: Patient will achieve/maintain optimum respiratory ventilation and gas exchange  Description: Target End Date:  Prior to discharge or change in level of careDocument on Assessment flowsheet1.  Assess and monitor rate, rhythm, depth and effort of respiration2.  Breath sounds assessed qshift and/or as needed3.  Assess O2 saturation, administer/titrate oxygen as ordered4.  Position patient for maximum ventilatory efficiency5.  Turn, cough, and deep breath with splinting to improve effectiveness6.  Collaborate with RT to administer medication/treatments per order7.  Encourage use of incentive spirometer and encourage patient to cough after use and utilize splinting techniques if applicable8.  Airway suctioning9.  Monitor sputum production for changes in color, consistency and [id number]. Perform frequent oral [id number]. Alternate physical activity with rest periods  Outcome: Progressing     Problem: Pain - Standard  Goal: Alleviation of pain  or a reduction in pain to the patient’s comfort goal  Description: Target End Date:  Prior to discharge or change in level of careDocument on Vitals flowsheet1.  Document pain using the appropriate pain scale per order or unit policy2.  Educate and implement non-pharmacologic comfort measures (i.e. relaxation, distraction, massage, cold/heat therapy, etc.)3.  Pain management medications as ordered4.  Reassess pain after pain med administration per policy5.  If opiods administered assess patient's response to pain medication is appropriate per POSS sedation scale6.  Follow pain management plan developed in collaboration with patient and interdisciplinary team (including palliative care or pain specialists if applicable)  Outcome: Progressing     Problem: Fall Risk  Goal: Patient will remain free from falls  Description: Target End Date:  Prior to discharge or change in level of careDocument interventions on the [facility] Fall Risk Assessment1.  Assess for fall risk factors2.  Implement fall precautions  Outcome: Progressing

## 2025-04-02 NOTE — CARE PLAN
The patient is Stable - Low risk of patient condition declining or worsening    Shift Goals  Clinical Goals: pain control, drain management, rest  Patient Goals: pain control, rest  Family Goals: APOLINAR    Progress made toward(s) clinical / shift goals: POC discussed and all questions answered. Patient medicated per MAR. KELLY drain monitored and output recorded. Patient intermittently sleeping throughout shift.       Problem: Knowledge Deficit - Standard  Goal: Patient and family/care givers will demonstrate understanding of plan of care, disease process/condition, diagnostic tests and medications  Description: Target End Date:  1-3 days or as soon as patient condition allowsDocument in Patient Education1.  Patient and family/caregiver oriented to unit, equipment, visitation policy and means for communicating concern2.  Complete/review Learning Assessment3.  Assess knowledge level of disease process/condition, treatment plan, diagnostic tests and medications4.  Explain disease process/condition, treatment plan, diagnostic tests and medications  Outcome: Progressing     Problem: Pain - Standard  Goal: Alleviation of pain or a reduction in pain to the patient’s comfort goal  Description: Target End Date:  Prior to discharge or change in level of careDocument on Vitals flowsheet1.  Document pain using the appropriate pain scale per order or unit policy2.  Educate and implement non-pharmacologic comfort measures (i.e. relaxation, distraction, massage, cold/heat therapy, etc.)3.  Pain management medications as ordered4.  Reassess pain after pain med administration per policy5.  If opiods administered assess patient's response to pain medication is appropriate per POSS sedation scale6.  Follow pain management plan developed in collaboration with patient and interdisciplinary team (including palliative care or pain specialists if applicable)  Outcome: Progressing       Patient is not progressing towards the following  goals:

## 2025-04-02 NOTE — DISCHARGE PLANNING
Case Management Discharge Planning    Admission Date: 3/21/2025  GMLOS: 4.4  ALOS: 12    6-Clicks ADL Score: 24  6-Clicks Mobility Score: 21      Anticipated Discharge Dispo: Discharge Disposition: D/T to home under [facility] care in anticipation of covered skilled care (06)    DME Needed: No    Action(s) Taken: Updated Provider/Nurse on Discharge Plan    Pt was discussed in IDT rounds with Dr Piña.  Per rounds , repeat CT Scan was done  with Plan for IR drain to be removed today .    [facility]  Pt is on service with [employer] for 3 liters oxygen at baseline.     Met with Pt at beside and provided him a copy of his [place] docs from [facility] and instructed Pt to submit it to his Employer. Pt also was included in the e mail. Pt s Son Andrew informed . Andrew to bring a tank at bedside tomorrow and provide transport to home.       Per PM rounds , plan to discharge Pt tomorrow.    Escalations Completed: None    Medically Clear: No    Next Steps:   CM to continue to assist Pt with discharge as needed    Barriers to Discharge: No    Is the patient up for discharge tomorrow: No

## 2025-04-02 NOTE — PROGRESS NOTES
Radiology Progress Note     Author: Kathia Grover DNP Date & Time created: 4/2/2025  1:53 PM   Date of admission  3/21/2025  Note to reader: this note follows the APSO format rather than the historical SOAP format. Assessment and plan located at the top of the note for ease of use.    Chief Complaint  75 y.o. male admitted 3/21/2025 with abdominal pain    HPI  Eduardo Donis is a 75-year-old male with PMH significant for HFrEF, PE (on Eliquis), morbid obesity, and recent cholecystitis s/p cholecystectomy (03/13/25) transferred from outside facility for CT finding of gallbladder fossa fluid collection after presenting with continued abdominal pain.  IR was consulted and patient underwent CT-guided gallbladder fossa fluid collection drain with IR Dr. Mooney on 03/22/2025. 80 mL old, cloudy, bloody fluid was drained at time of drain placement    Interval History:   03/23/2025 - GB fossa drain to RUQ with no output in the last 24 hours.  I flushed the drain with 10 mLs NS with moderate amount of maroon fluid returned in the suction bulb. Labs reviewed by me including WBC 16.1, H&H 12.4/37.5, creatinine 0.75.  GB fossa fluid cultures pending.  On ABX.  HIDA scan without evidence of biliary leak. Pt had many questions regarding plan of care and all questions answered to patient's satisfaction using  line.  I reviewed all IDT notes, reviewed all imaging, and discussed patient with bedside RN.    03/24/2025 - GB fossa drain to RUQ with 5 mL maroon output in the last 24 hours.  I flushed the drain with 10 mLs NS with instant return of moderate maroon output upon return to bulb suction. Labs reviewed by me including: WBC 13.8, H&H 12.5/39.3, creatinine 0.8. GB fossa fluid cultures positive for Enterococcus faecalis. On ABX. I reviewed all IDT notes, reviewed all imaging, and discussed patient with Hospitalist and hospital nursing staff.     03/25/2025 - GB fossa drain to RUQ with 30 mL dark maroon output in  "the last 24 hours. I flushed the drain with 10 mLs NS with instant return of moderate maroon output upon returning to bulb suction. Labs reviewed by me including: WBC 11.7, H&H 13.3/42.7, creatinine 0.78. GB fossa fluid cultures positive. On ABX. I reviewed all IDT notes and discussed patient care with Hospitalist.    03/26/2025 - GB fossa drain to RUQ with 30 mL dark maroon/brown output in the last 24 hours. I flushed the drain with 10 mLs NS with instant return of light maroon output upon returning to bulb suction. Labs reviewed by me including: WBC 11.9, H&H 12.9/42.3, creatinine 0.77. GB fossa fluid cultures positive. On ABX. I reviewed all IDT notes and discussed patient care with hospital nursing staff.    3/27/25 - CT abdomen/pelvis today shows: \"Decreased gallbladder fossa fluid collection status post percutaneous drain placement now measuring 3.9 x 5.7 cm, previously 10.2 x 6.8 cm\" GB fossa drain to RUQ with 33 mL dark maroon/brown output in the last 24 hours. I flushed the drain with 10 mLs NS with instant return of light maroon output upon returning to bulb suction. Labs reviewed by me including: WBC 11.5, H&H 13.3/42, creatinine 0.77. GB fossa fluid cultures positive. On ABX. I reviewed plan with patient at bedside.    3/28/25 - GB fossa drain to RUQ with 18 mL dark maroon/brown output in the last 24 hours. I flushed the drain with 10 mLs NS with instant return of maroon output upon returning to bulb suction. No new labs to review today. GB fossa fluid cultures positive. On ABX. I reviewed plan with patient at bedside and reviewed IDT notes. I discussed plan of care with Hospitalist.    3/29/25 - Suction bulb found to be missing port plug overnight, bulb replaced by hospital nursing staff; bulb holding suction upon my assessment today. GB fossa drain to RUQ with 10 mL dark maroon/brown output in the last 24 hours. Drain flushed by hospital nursing staff. Labs I reviewed today: WBC 9, H/H 13.5/42.5, Cr " 0.76. GB fossa fluid cultures positive. On ABX. I reviewed plan with patient at bedside, discussed patient with hospitalist, and reviewed IDT notes.    3/31/2025: GB fossa drain (12F) to bulb suction with less than 10 mL dark brown fluid reported output in the last 24 hours.  Irrigated drain with 20 mL sterile saline with improved and increased drain output (approximately 30 mL).  [facility], reclining in bed, talking on his phone.  He reports continued abdominal pain, primarily in the area of drain.  He denies nausea or vomiting, and reports regular bowel movements.  I reviewed patient's most recent labs including WBC 9.0<11.5, Hgb 13.5, CR 0.76.  Coordinated post IR procedure care with patient and hospital nursing staff.    4/1/2025: GB fossa drain (12F) to bulb suction with 20 mL recorded dark brown fluid output in the last 24 hours.  Irrigated drain with 10 mL sterile saline with 10 mL dark brown brisk aspirated return.  Patient is [facility] to [facility], reclining in hospital bed.  He states abdominal pain is improved.  He is without nausea or vomiting and continues to have regular bowel movements.  I reviewed patient's most recent labs including WBC 9.0<11.5, Hgb 13.5, CR 0.63.  Coordinated post IR procedure care with patient interventional radiologist, hospitalist, and hospital nursing staff.    4/2/25 GB fossa drain (12F) to bulb suction with 15 mL recorded dark brown fluid output in the last 24 hours.  Irrigated drain with 10 mL sterile saline with 10 mL dark brown aspirated return.  Patient is [facility] to [facility], ambulatory to restroom with O2 via nasal cannula.  He states abdominal pain is gone.  He has no reported nausea or vomiting. 4/2/25 CT demonstrates small residual low-density fluid collection in gallbladder fossa with pigtail drain in place, decreased since prior study.  I reviewed patient's most recent labs including WBC 8.1, Hgb 14.0, CR 0.81.  Coordinated post IR procedure care with patient,  interventional radiologist, hospitalist, and hospital nursing staff.    Assessment/Plan     Principal Problem:    Gallbladder abscess  Active Problems:    Abdominal pain    Acute on chronic systolic congestive heart failure (HCC)    DVT (deep venous thrombosis) (HCC)    SIRS (systemic inflammatory response syndrome) (HCC)    ACP (advance care planning)    History of pulmonary embolism    SBO (small bowel obstruction) (HCC)    Acute on chronic respiratory failure with hypoxia (HCC)    Circulating anticoagulant disorder (HCC)    Enlarged prostate    Abnormal finding on lung imaging    S/P cholecystectomy    Bilateral inguinal hernia without obstruction or gangrene      Plan IR  -Plan for drain removal on 4/3/2025 per IR Dr. Schulte  - Continue to irrigate GB fossa drain with 10 ml of sterile saline each shift  - GB fossa fluid cultures positive for Enterococcus faecalis  - Hospitalist, Gen Surg, and Urology following   - Continue to monitor drain output, vital signs, and labs  - IR will continue to follow while patient remains in the hospital  - Outpatient follow-up with surgical services.    -Thank you for allowing Interventional Radiology team to participate in the patients care, if any additional care or requests are needed in the future please do not hesitate to call or place IR order.        Review of Systems  Physical Exam   Review of Systems   Constitutional:  Positive for malaise/fatigue. Negative for chills and fever.   Respiratory:  Negative for cough and shortness of breath.    Cardiovascular:  Negative for chest pain.   Gastrointestinal:  Negative for abdominal pain, constipation, nausea and vomiting.   Musculoskeletal:  Negative for myalgias.   Neurological:  Negative for weakness and headaches.   Psychiatric/Behavioral: Negative.        Vitals:    04/02/25 0745   BP: 109/65   Pulse: 75   Resp: 17   Temp: 36.4 °C (97.5 °F)   SpO2: 93%        Physical Exam  Vitals and nursing note reviewed.   Constitutional:        General: He is not in acute distress.     Appearance: Normal appearance. He is not ill-appearing.   HENT:      Head: Atraumatic.   Cardiovascular:      Rate and Rhythm: Normal rate.      Pulses: Normal pulses.   Pulmonary:      Effort: Pulmonary effort is normal. No respiratory distress.   Abdominal:      Tenderness: There is no abdominal tenderness.      Comments: IR drain to RUQ   Musculoskeletal:      Right lower leg: Edema (Mild) present.      Left lower leg: Edema (Mild) present.   Skin:     General: Skin is warm and dry.      Coloration: Skin is not jaundiced or pale.   Neurological:      General: No focal deficit present.      Mental Status: He is alert and oriented to person, place, and time.   Psychiatric:         Mood and Affect: Mood normal.         Behavior: Behavior normal.          Labs    Recent Labs     04/02/25  0707   WBC 8.1   RBC 5.05   HEMOGLOBIN 14.0   HEMATOCRIT 44.5   MCV 88.1   MCH 27.7   MCHC 31.5*   RDW 47.2   PLATELETCT 308   MPV 10.5       Recent Labs     04/01/25  0347 04/02/25  0707   SODIUM 138 139   POTASSIUM 4.1 4.2   CHLORIDE 101 101   CO2 25 28   GLUCOSE 94 92   BUN 15 13   CREATININE 0.63 0.81   CALCIUM 9.0 9.0     Recent Labs     04/01/25  0347 04/02/25  0707   ALBUMIN  --  3.4   TBILIRUBIN  --  0.7   ALKPHOSPHAT  --  103*   TOTPROTEIN  --  7.2   ALTSGPT  --  7   ASTSGOT  --  25   CREATININE 0.63 0.81     CT-ABDOMEN WITH   Final Result         1.  Small residual low-density fluid collection in gallbladder fossa with pigtail drain in place, decreased since prior study.   2.  Small layering right pleural effusion   3.  Linear density in the right lung base favors changes of atelectasis component of infiltrate not excluded   4.  Irregular hepatic contour favoring changes of cirrhosis   5.  Minimal atherosclerosis      CT-ABDOMEN-PELVIS WITH   Final Result      1.  Decreased gallbladder fossa fluid collection status post percutaneous drain placement now measuring 3.9 x 5.7 cm,  "previously 10.2 x 6.8 cm.   2.  Small right effusion, partially visualized.   3.  Bilateral lower lobe atelectasis and/or consolidation, underlying infection is possible.   4.  Unchanged trace perihepatic fluid adjacent to the dome of the liver.   5.  Status post cholecystectomy.   6.  Small fat-containing bilateral internal hernias.   7.  Enlarged prostate.   8.  Atherosclerosis.      DX-CHEST-PORTABLE (1 VIEW)   Final Result      Findings suggestive of pulmonary edema with elevation of the right hemidiaphragm and bibasilar atelectasis/infiltrate.      NM-HEPATOBILIARY SCAN   Final Result      No scintigraphy evidence of bile leak during the 60 minute imaging.      CT-IMAGE-GUIDED DRAIN PERITONEAL   Final Result      1.  CT GUIDED PLACEMENT OF A PERCUTANEOUS DRAINAGE CATHETER IN A GALLBLADDER FOSSA FLUID COLLECTION.   2.  THE CURRENT PLAN IS TO MONITOR DRAINAGE OUTPUT AND OBTAIN A FOLLOWUP CT SCAN IN 5-7 DAYS IF CLINICALLY INDICATED.        INR   Date Value Ref Range Status   03/21/2025 1.09 0.87 - 1.13 Final     Comment:     INR - Non-therapeutic Reference Range: 0.87-1.13  INR - Therapeutic Reference Range: 2.0-4.0       No results found for: \"POCINR\"     Intake/Output Summary (Last 24 hours) at 3/23/2025 0758  Last data filed at 3/23/2025 0714  Gross per 24 hour   Intake 0 ml   Output 0 ml   Net 0 ml      I have personally reviewed the above labs and imaging      I have performed a physical exam and reviewed and updated ROS and Plan today (4/2/2025).     36 minutes in directly providing and coordinating care and extensive data review.  No time overlap and excludes procedures.   "

## 2025-04-02 NOTE — DISCHARGE INSTRUCTIONS
Discharge Instructions per Kennedy Piña M.D.    You had an abscess in your abdomen that was drained and the infection was treated with antibiotics.  Please follow-up with [facility] in 1 to 2 weeks for checkup.    Please also see your primary care doctor in 1-2 weeks to recheck your oxygen level.

## 2025-04-03 VITALS
WEIGHT: 254.19 LBS | RESPIRATION RATE: 18 BRPM | HEIGHT: 66 IN | SYSTOLIC BLOOD PRESSURE: 136 MMHG | OXYGEN SATURATION: 90 % | TEMPERATURE: 98.2 F | BODY MASS INDEX: 40.85 KG/M2 | DIASTOLIC BLOOD PRESSURE: 72 MMHG | HEART RATE: 77 BPM

## 2025-04-03 PROCEDURE — A9270 NON-COVERED ITEM OR SERVICE: HCPCS

## 2025-04-03 PROCEDURE — A9270 NON-COVERED ITEM OR SERVICE: HCPCS | Performed by: STUDENT IN AN ORGANIZED HEALTH CARE EDUCATION/TRAINING PROGRAM

## 2025-04-03 PROCEDURE — 700102 HCHG RX REV CODE 250 W/ 637 OVERRIDE(OP): Performed by: STUDENT IN AN ORGANIZED HEALTH CARE EDUCATION/TRAINING PROGRAM

## 2025-04-03 PROCEDURE — 700102 HCHG RX REV CODE 250 W/ 637 OVERRIDE(OP)

## 2025-04-03 PROCEDURE — 700101 HCHG RX REV CODE 250

## 2025-04-03 PROCEDURE — 99239 HOSP IP/OBS DSCHRG MGMT >30: CPT | Performed by: INTERNAL MEDICINE

## 2025-04-03 RX ADMIN — CARVEDILOL 6.25 MG: 6.25 TABLET, FILM COATED ORAL at 09:01

## 2025-04-03 RX ADMIN — APIXABAN 5 MG: 5 TABLET, FILM COATED ORAL at 05:57

## 2025-04-03 RX ADMIN — Medication 1 APPLICATOR: at 06:00

## 2025-04-03 RX ADMIN — FUROSEMIDE 40 MG: 20 TABLET ORAL at 05:57

## 2025-04-03 RX ADMIN — METHOCARBAMOL 750 MG: 750 TABLET ORAL at 09:01

## 2025-04-03 RX ADMIN — LIDOCAINE 1 PATCH: 4 PATCH TOPICAL at 09:01

## 2025-04-03 RX ADMIN — OXYCODONE 5 MG: 5 TABLET ORAL at 05:57

## 2025-04-03 ASSESSMENT — ENCOUNTER SYMPTOMS
NAUSEA: 0
HEADACHES: 0
SHORTNESS OF BREATH: 0
COUGH: 0
FEVER: 0
ABDOMINAL PAIN: 0
PSYCHIATRIC NEGATIVE: 1
CONSTIPATION: 0
CHILLS: 0
WEAKNESS: 0
MYALGIAS: 0
VOMITING: 0

## 2025-04-03 ASSESSMENT — PAIN DESCRIPTION - PAIN TYPE
TYPE: ACUTE PAIN

## 2025-04-03 NOTE — PROGRESS NOTES
Radiology Progress Note     Author: PATTIE Barone  Date & Time created: 4/3/2025  9:06 AM   Date of admission  3/21/2025  Note to reader: this note follows the APSO format rather than the historical SOAP format. Assessment and plan located at the top of the note for ease of use.    Chief Complaint  75 y.o. male admitted 3/21/2025 with abdominal pain    HPI  Eduardo Donis is a 75-year-old male with PMH significant for HFrEF, PE (on Eliquis), morbid obesity, and recent cholecystitis s/p cholecystectomy (03/13/25) transferred from outside facility for CT finding of gallbladder fossa fluid collection after presenting with continued abdominal pain.  IR was consulted and patient underwent CT-guided gallbladder fossa fluid collection drain with IR Dr. Mooney on 03/22/2025. 80 mL old, cloudy, bloody fluid was drained at time of drain placement    Interval History:   03/23/2025 - GB fossa drain to RUQ with no output in the last 24 hours.  I flushed the drain with 10 mLs NS with moderate amount of maroon fluid returned in the suction bulb. Labs reviewed by me including WBC 16.1, H&H 12.4/37.5, creatinine 0.75.  GB fossa fluid cultures pending.  On ABX.  HIDA scan without evidence of biliary leak. Pt had many questions regarding plan of care and all questions answered to patient's satisfaction using  line.  I reviewed all IDT notes, reviewed all imaging, and discussed patient with bedside RN.    03/24/2025 - GB fossa drain to RUQ with 5 mL maroon output in the last 24 hours.  I flushed the drain with 10 mLs NS with instant return of moderate maroon output upon return to bulb suction. Labs reviewed by me including: WBC 13.8, H&H 12.5/39.3, creatinine 0.8. GB fossa fluid cultures positive for Enterococcus faecalis. On ABX. I reviewed all IDT notes, reviewed all imaging, and discussed patient with Hospitalist and hospital nursing staff.     03/25/2025 - GB fossa drain to RUQ with 30 mL dark maroon output in  "the last 24 hours. I flushed the drain with 10 mLs NS with instant return of moderate maroon output upon returning to bulb suction. Labs reviewed by me including: WBC 11.7, H&H 13.3/42.7, creatinine 0.78. GB fossa fluid cultures positive. On ABX. I reviewed all IDT notes and discussed patient care with Hospitalist.    03/26/2025 - GB fossa drain to RUQ with 30 mL dark maroon/brown output in the last 24 hours. I flushed the drain with 10 mLs NS with instant return of light maroon output upon returning to bulb suction. Labs reviewed by me including: WBC 11.9, H&H 12.9/42.3, creatinine 0.77. GB fossa fluid cultures positive. On ABX. I reviewed all IDT notes and discussed patient care with hospital nursing staff.    3/27/25 - CT abdomen/pelvis today shows: \"Decreased gallbladder fossa fluid collection status post percutaneous drain placement now measuring 3.9 x 5.7 cm, previously 10.2 x 6.8 cm\" GB fossa drain to RUQ with 33 mL dark maroon/brown output in the last 24 hours. I flushed the drain with 10 mLs NS with instant return of light maroon output upon returning to bulb suction. Labs reviewed by me including: WBC 11.5, H&H 13.3/42, creatinine 0.77. GB fossa fluid cultures positive. On ABX. I reviewed plan with patient at bedside.    3/28/25 - GB fossa drain to RUQ with 18 mL dark maroon/brown output in the last 24 hours. I flushed the drain with 10 mLs NS with instant return of maroon output upon returning to bulb suction. No new labs to review today. GB fossa fluid cultures positive. On ABX. I reviewed plan with patient at bedside and reviewed IDT notes. I discussed plan of care with Hospitalist.    3/29/25 - Suction bulb found to be missing port plug overnight, bulb replaced by hospital nursing staff; bulb holding suction upon my assessment today. GB fossa drain to RUQ with 10 mL dark maroon/brown output in the last 24 hours. Drain flushed by hospital nursing staff. Labs I reviewed today: WBC 9, H/H 13.5/42.5, Cr " 0.76. GB fossa fluid cultures positive. On ABX. I reviewed plan with patient at bedside, discussed patient with hospitalist, and reviewed IDT notes.    3/31/2025: GB fossa drain (12F) to bulb suction with less than 10 mL dark brown fluid reported output in the last 24 hours.  Irrigated drain with 20 mL sterile saline with improved and increased drain output (approximately 30 mL).  [facility] reclining in bed, talking on his phone.  He reports continued abdominal pain, primarily in the area of drain.  He denies nausea or vomiting, and reports regular bowel movements.  I reviewed patient's most recent labs including WBC 9.0<11.5, Hgb 13.5, CR 0.76.  Coordinated post IR procedure care with patient and hospital nursing staff.    4/1/2025: GB fossa drain (12F) to bulb suction with 20 mL recorded dark brown fluid output in the last 24 hours.  Irrigated drain with 10 mL sterile saline with 10 mL dark brown brisk aspirated return.  Patient is [facility] to [facility], reclining in hospital bed.  He states abdominal pain is improved.  He is without nausea or vomiting and continues to have regular bowel movements.  I reviewed patient's most recent labs including WBC 9.0<11.5, Hgb 13.5, CR 0.63.  Coordinated post IR procedure care with patient interventional radiologist, hospitalist, and hospital nursing staff.    4/2/25 GB fossa drain (12F) to bulb suction with 15 mL recorded dark brown fluid output in the last 24 hours.  Irrigated drain with 10 mL sterile saline with 10 mL dark brown aspirated return.  Patient is [facility] to [facility], ambulatory to restroom with O2 via nasal cannula.  He states abdominal pain is gone.  He has no reported nausea or vomiting. 4/2/25 CT demonstrates small residual low-density fluid collection in gallbladder fossa with pigtail drain in place, decreased since prior study.  I reviewed patient's most recent labs including WBC 8.1, Hgb 14.0, CR 0.81.  Coordinated post IR procedure care with patient,  interventional radiologist, hospitalist, and hospital nursing staff.    4/3/25-RUQ-GB fossa drain (12F) to bulb suction with 5 mL recorded light brown fluid out in the last 24 hours.  I removed drain at bedside.  Patient tolerated drain removal well.  Denies any abdominal pain prior or post drain removal.  He has no reported nausea or vomiting.  On 4/2/25 CT showed a small residual low-density fluid collection in gallbladder fossa with pigtail drain in place, that had decreased since prior study.  I reviewed patient's most recent labs including WBC 8.1, Hgb 14.0, CR 0.81.  Micro-from drain placement on 3/22 positive for Enterococcus faecalis.  I discussed plan of care/postop care with patient, patient son, interventional radiologist, hospitalist, and hospital nursing staff.  PT notes reviewed    Assessment/Plan     Principal Problem:    Gallbladder abscess  Active Problems:    Abdominal pain    Acute on chronic systolic congestive heart failure (HCC)    DVT (deep venous thrombosis) (HCC)    SIRS (systemic inflammatory response syndrome) (HCC)    ACP (advance care planning)    History of pulmonary embolism    SBO (small bowel obstruction) (HCC)    Acute on chronic respiratory failure with hypoxia (HCC)    Circulating anticoagulant disorder (HCC)    Enlarged prostate    Abnormal finding on lung imaging    S/P cholecystectomy    Bilateral inguinal hernia without obstruction or gangrene      Plan IR  -I removed drain at bedside without any complications.  A clean dry dressing was placed over site.  - Hospitalist, Gen Surg, and Urology following   - Outpatient follow-up with surgical services.  -IR signing off    -Thank you for allowing Interventional Radiology team to participate in the patients care, if any additional care or requests are needed in the future please do not hesitate to call or place IR order.        Review of Systems  Physical Exam   Review of Systems   Constitutional:  Negative for chills and fever.    Respiratory:  Negative for cough and shortness of breath.    Cardiovascular:  Negative for chest pain.   Gastrointestinal:  Negative for abdominal pain, constipation, nausea and vomiting.   Musculoskeletal:  Negative for myalgias.   Neurological:  Negative for weakness and headaches.   Psychiatric/Behavioral: Negative.        Vitals:    04/03/25 0853   BP: 136/72   Pulse:    Resp:    Temp:    SpO2:         Physical Exam  Vitals and nursing note reviewed.   Constitutional:       General: He is awake. He is not in acute distress.     Appearance: Normal appearance. He is not ill-appearing.   HENT:      Head: Atraumatic.      Mouth/Throat:      Mouth: Mucous membranes are dry.      Pharynx: Oropharynx is clear.   Cardiovascular:      Rate and Rhythm: Normal rate.      Pulses: Normal pulses.   Pulmonary:      Comments: 2 L nasal cannula in place.  Patient without any respiratory distress    Abdominal:      Tenderness: There is no abdominal tenderness.      Comments: IR drain to RUQ- site CDI    Musculoskeletal:      Right lower leg: Right lower leg edema: Mild.      Left lower leg: Left lower leg edema: Mild.   Skin:     General: Skin is warm and dry.      Coloration: Skin is not jaundiced or pale.   Neurological:      General: No focal deficit present.      Mental Status: He is alert and oriented to person, place, and time.   Psychiatric:         Attention and Perception: Attention normal.         Mood and Affect: Mood normal.         Behavior: Behavior normal. Behavior is cooperative.          Labs    Recent Labs     04/02/25  0707   WBC 8.1   RBC 5.05   HEMOGLOBIN 14.0   HEMATOCRIT 44.5   MCV 88.1   MCH 27.7   MCHC 31.5*   RDW 47.2   PLATELETCT 308   MPV 10.5       Recent Labs     04/01/25  0347 04/02/25  0707   SODIUM 138 139   POTASSIUM 4.1 4.2   CHLORIDE 101 101   CO2 25 28   GLUCOSE 94 92   BUN 15 13   CREATININE 0.63 0.81   CALCIUM 9.0 9.0     Recent Labs     04/01/25  0347 04/02/25  0707   ALBUMIN  --  3.4  "  TBILIRUBIN  --  0.7   ALKPHOSPHAT  --  103*   TOTPROTEIN  --  7.2   ALTSGPT  --  7   ASTSGOT  --  25   CREATININE 0.63 0.81     CT-ABDOMEN WITH   Final Result         1.  Small residual low-density fluid collection in gallbladder fossa with pigtail drain in place, decreased since prior study.   2.  Small layering right pleural effusion   3.  Linear density in the right lung base favors changes of atelectasis component of infiltrate not excluded   4.  Irregular hepatic contour favoring changes of cirrhosis   5.  Minimal atherosclerosis      CT-ABDOMEN-PELVIS WITH   Final Result      1.  Decreased gallbladder fossa fluid collection status post percutaneous drain placement now measuring 3.9 x 5.7 cm, previously 10.2 x 6.8 cm.   2.  Small right effusion, partially visualized.   3.  Bilateral lower lobe atelectasis and/or consolidation, underlying infection is possible.   4.  Unchanged trace perihepatic fluid adjacent to the dome of the liver.   5.  Status post cholecystectomy.   6.  Small fat-containing bilateral internal hernias.   7.  Enlarged prostate.   8.  Atherosclerosis.      DX-CHEST-PORTABLE (1 VIEW)   Final Result      Findings suggestive of pulmonary edema with elevation of the right hemidiaphragm and bibasilar atelectasis/infiltrate.      NM-HEPATOBILIARY SCAN   Final Result      No scintigraphy evidence of bile leak during the 60 minute imaging.      CT-IMAGE-GUIDED DRAIN PERITONEAL   Final Result      1.  CT GUIDED PLACEMENT OF A PERCUTANEOUS DRAINAGE CATHETER IN A GALLBLADDER FOSSA FLUID COLLECTION.   2.  THE CURRENT PLAN IS TO MONITOR DRAINAGE OUTPUT AND OBTAIN A FOLLOWUP CT SCAN IN 5-7 DAYS IF CLINICALLY INDICATED.        INR   Date Value Ref Range Status   03/21/2025 1.09 0.87 - 1.13 Final     Comment:     INR - Non-therapeutic Reference Range: 0.87-1.13  INR - Therapeutic Reference Range: 2.0-4.0       No results found for: \"POCINR\"     Intake/Output Summary (Last 24 hours) at 3/23/2025 0758  Last " data filed at 3/23/2025 0714  Gross per 24 hour   Intake 0 ml   Output 0 ml   Net 0 ml      I have personally reviewed the above labs and imaging      I have performed a physical exam and reviewed and updated ROS and Plan today (4/3/2025).     40 minutes in directly providing and coordinating care and extensive data review.  No time overlap and excludes procedures.

## 2025-04-03 NOTE — DISCHARGE PLANNING
Case Management Discharge Planning    Admission Date: 3/21/2025  GMLOS: 4.4  ALOS: 13    6-Clicks ADL Score: 24  6-Clicks Mobility Score: 21      Anticipated Discharge Dispo: Discharge Disposition: D/T to home under [facility] care in anticipation of covered skilled care (06)    DME Needed: Yes    DME Ordered: On service    Action(s) Taken: LMSW called [employer] to see if pt can get O2 tank delivered to bedside as pt son did not bring tank and reported pt tank at home is empty.  [employer] reported pt is not on service with them and not in their system.    916  LMSW called pt son, Kamille, who reported that the tank at home is not empty and Andrew reported he will bring it to bedside once notified pt is ready to DC and will transport pt home.    Escalations Completed: None    Medically Clear: Yes    Next Steps: LMSW will continue to assist with O2 tank needs.    Barriers to Discharge: O2 tank at bedside

## 2025-04-03 NOTE — PROGRESS NOTES
Pt discharged to DC with family member. Patient states he has all belongings. Patient not requiring home O2 at this time, see walking O2 assessment. Home O2 previously established and available at home with son. All needs met at this time.

## 2025-04-03 NOTE — PROGRESS NOTES
Bedside report received.  Assessment complete.  A&O x 4. Patient calls appropriately.  Patient ambulates with standby assist. Bed alarm off.   Patient has 6/10 pain. Patient medicated per MAR.  Denies N&V. Tolerating cardiac diet.  RUQ IR drain to bulb suction, CDI.  + void, + flatus, - BM.  Patient denies SOB.  SCD's refused.  Patient calm and cooperative with POC.  Review plan with of care with patient. Call light and personal belongings within reach. Hourly rounding in place. All needs met at this time.

## 2025-04-03 NOTE — PROGRESS NOTES
Bedside report received.  Assessment complete.  A&O x 4. Patient calls appropriately.  Patient ambulates up self. Bed alarm on.   Patient has 4/10 pain. Patient medicated per MAR.  Denies N&V. Tolerating cardiac diet.  Surgical RUQ KELLY drain, DIP, CDI.   + void, + flatus, 4/3 BM.  Patient denies SOB and chest pain.  IS 1500.  SCD's refused.  Review plan with of care with patient. Call light and personal belongings within reach. Hourly rounding in place. All needs met at this time.

## 2025-04-03 NOTE — CARE PLAN
The patient is Stable - Low risk of patient condition declining or worsening    Shift Goals  Clinical Goals: pain control, KELLY drain management, rest  Patient Goals: pain control, rest  Family Goals: APOLINAR    Progress made toward(s) clinical / shift goals: Plan of care discussed with patient, all questions answered. Patient medicated per MAR. RUQ IR drain to bulb suction, output recorded. Patient intermittently sleeping throughout shift.       Problem: Knowledge Deficit - Standard  Goal: Patient and family/care givers will demonstrate understanding of plan of care, disease process/condition, diagnostic tests and medications  Description: Target End Date:  1-3 days or as soon as patient condition allowsDocument in Patient Education1.  Patient and family/caregiver oriented to unit, equipment, visitation policy and means for communicating concern2.  Complete/review Learning Assessment3.  Assess knowledge level of disease process/condition, treatment plan, diagnostic tests and medications4.  Explain disease process/condition, treatment plan, diagnostic tests and medications  Outcome: Progressing     Problem: Pain - Standard  Goal: Alleviation of pain or a reduction in pain to the patient’s comfort goal  Description: Target End Date:  Prior to discharge or change in level of careDocument on Vitals flowsheet1.  Document pain using the appropriate pain scale per order or unit policy2.  Educate and implement non-pharmacologic comfort measures (i.e. relaxation, distraction, massage, cold/heat therapy, etc.)3.  Pain management medications as ordered4.  Reassess pain after pain med administration per policy5.  If opiods administered assess patient's response to pain medication is appropriate per POSS sedation scale6.  Follow pain management plan developed in collaboration with patient and interdisciplinary team (including palliative care or pain specialists if applicable)  Outcome: Progressing       Patient is not progressing  towards the following goals:

## 2025-04-03 NOTE — FACE TO FACE
"Face to Face Note  -  Durable Medical Equipment    Kennedy Piña M.D. - NPI: [id number]  I certify that this patient is under my care and that they had a durable medical equipment(DME)face to face encounter by myself that meets the physician DME face-to-face encounter requirements with this patient on:    Date of encounter:   Patient:                    MRN:                       YOB: 2024  Eduardo Donis  [id number]  1950     The encounter with the patient was in whole, or in part, for the following medical condition, which is the primary reason for durable medical equipment:  CHF    I certify that, based on my findings, the following durable medical equipment is medically necessary:    Oxygen   HOME O2 Saturation Measurements:(Values must be present for Home Oxygen orders)  Room air sat at rest: 93  Room air sat with amb: 90  With liters of O2: 3, O2 sat at rest with O2: 96  With Liters of O2: 3, O2 sat with amb with O2 : 92  Is the patient mobile?: Yes  If patient feels more short of breath, they can go up to 6 liters per minute and contact healthcare provider.    Supporting Symptoms: The patient requires supplemental oxygen, as the following interventions have been tried with limited or no improvement: \"Ambulation with oximetry and \"Incentive spirometry.    My Clinical findings support the need for the above equipment due to:  Hypoxia  "

## 2025-04-03 NOTE — PROGRESS NOTES
"       S: Eduardo Donis  is a 75 y.o.  male admitted for drain placement into cystic fossa abscess.  IR planning to remove today.      O:  /72   Pulse 77   Temp 36.8 °C (98.2 °F) (Temporal)   Resp 18   Ht 1.676 m (5' 6\")   Wt 115 kg (254 lb 3.1 oz)   SpO2 93%   Intake/Output                               04/01/25 0700 - 04/02/25 0659 04/02/25 0700 - 04/03/25 0659 04/03/25 0700 - 04/04/25 0659     [id number] 2016-5266 Total 9229-4402 4724-4857 Total 2657-8925 4731-9823 Total                    Intake    P.O.  350  120 470  --  120 120  --  -- --    P.O. 350 120 470 -- 120 120 -- -- --    Total Intake 350 120 470 -- 120 120 -- -- --       Output    Urine  --  -- --  --  -- --  --  -- --    Number of Times Voided 1 x -- 1 x 2 x 2 x 4 x -- -- --    Drains  15  0 15  0  5 5  --  -- --    Output (mL) (Closed/Suction Drain RUQ Vern Capone) 15 0 15 0 5 5 -- -- --    Stool  --  -- --  --  -- --  --  -- --    Number of Times Stooled 2 x 0 x 2 x -- 0 x 0 x -- -- --    Total Output 15 0 15 0 5 5 -- -- --       Net I/O     335 120 455 0 115 115 -- -- --          Recent Labs     04/01/25  0347 04/02/25  0707   SODIUM 138 139   POTASSIUM 4.1 4.2   CHLORIDE 101 101   CO2 25 28   GLUCOSE 94 92   BUN 15 13   CREATININE 0.63 0.81   CALCIUM 9.0 9.0     Recent Labs     04/02/25  0707   WBC 8.1   RBC 5.05   HEMOGLOBIN 14.0   HEMATOCRIT 44.5   MCV 88.1   MCH 27.7   MCHC 31.5*   RDW 47.2   PLATELETCT 308   MPV 10.5       Alert and Oriented x3, No Acute Distress  Normal Respiratory Effort  Abdomen soft, appropriately tender  Incisions/Bandages clean/dry/intact  Extremities warm and well perfused    A/P:  IR plan is removed today.  Follow-up in 1 week as previously scheduled.    Reinaldo Beltran MD  [facility]   "

## 2025-04-03 NOTE — DISCHARGE SUMMARY
Discharge Summary    CHIEF COMPLAINT ON ADMISSION  No chief complaint on file.      Reason for Admission  Medical (post-op complications)     Admission Date  3/21/2025    CODE STATUS  Full Code    HPI & HOSPITAL COURSE  74 yo man with CHF, PE on Eliquis, recent cholecystectomy who presented with abdominal pain and CT showed postsurgical abscess. He was started on IV Zosyn. Surgery was consulted and had IR place a drain. HIDA scan was negative for a bile leak. Fluid culture grew Enterococcus faecalis and he was transitioned to amoxicillin which he completed.  He had a follow-up CT that showed resolution of the abscess and IR removed the drain.  Patient was placed on oxygen at home after his cholecystectomy.  Prior to discharge he ambulated and did not require oxygen.    Therefore, he is discharged in good and stable condition to home with close outpatient follow-up.    The patient met 2-midnight criteria for an inpatient stay at the time of discharge.    Discharge Date  4/3/2025    FOLLOW UP ITEMS POST DISCHARGE  Follow-up with surgeon    DISCHARGE DIAGNOSES  Principal Problem:    Gallbladder abscess (POA: Yes)  Active Problems:    Abdominal pain (POA: Yes)    Acute on chronic systolic congestive heart failure (HCC) (POA: Unknown)    DVT (deep venous thrombosis) (HCC) (POA: Unknown)    SIRS (systemic inflammatory response syndrome) (HCC) (POA: Unknown)    ACP (advance care planning) (POA: Unknown)    History of pulmonary embolism (POA: Unknown)    SBO (small bowel obstruction) (HCC) (POA: Yes)    Acute on chronic respiratory failure with hypoxia (HCC) (POA: Unknown)    Circulating anticoagulant disorder (HCC) (POA: Unknown)    Enlarged prostate (POA: Unknown)    Abnormal finding on lung imaging (POA: Unknown)    S/P cholecystectomy (POA: Unknown)    Bilateral inguinal hernia without obstruction or gangrene (POA: Unknown)  Resolved Problems:    * No resolved hospital problems. *      FOLLOW UP  [facility]  [address]  [facility] [address]  [phone]  Follow up      [facility]  [address]  [facility] [address]  [phone]          MEDICATIONS ON DISCHARGE     Medication List        CONTINUE taking these medications        Instructions   atorvastatin 20 MG Tabs  Commonly known as: Lipitor   Take 20 mg by mouth every day.  Dose: 20 mg     carvedilol 6.25 MG Tabs  Commonly known as: Coreg   Take 6.25 mg by mouth 2 times a day.  Dose: 6.25 mg     Eliquis 5mg Tabs  Generic drug: apixaban   Take 5 mg by mouth 2 times a day.  Dose: 5 mg     finasteride 5 MG Tabs  Commonly known as: Proscar   Take 5 mg by mouth every day.  Dose: 5 mg     furosemide 40 MG Tabs  Commonly known as: Lasix   Take 40 mg by mouth every day.  Dose: 40 mg     ibuprofen 200 MG Tabs  Commonly known as: Motrin   Take 200 mg by mouth every 6 hours as needed for Mild Pain.  Dose: 200 mg     losartan 25 MG Tabs  Commonly known as: Cozaar   Take 25 mg by mouth every day.  Dose: 25 mg     oxybutynin SR 10 MG CR tablet  Commonly known as: Ditropan-XL   Take 10 mg by mouth every day.  Dose: 10 mg     tamsulosin 0.4 MG capsule  Commonly known as: Flomax   Take 0.4 mg by mouth every day.  Dose: 0.4 mg            STOP taking these medications      amoxicillin-clavulanate 875-125 MG Tabs  Commonly known as: Augmentin              Allergies  No Known Allergies    DIET  Orders Placed This Encounter   Procedures    Diet Order Diet: Cardiac     Standing Status:   Standing     Number of Occurrences:   1     Diet::   Cardiac [6]       ACTIVITY  As tolerated.    CONSULTATIONS  Surgery, IR    PROCEDURES  CT-ABDOMEN WITH   Final Result         1.  Small residual low-density fluid collection in gallbladder fossa with pigtail drain in place, decreased since prior study.   2.  Small layering right pleural effusion   3.  Linear density in the right lung base favors changes of atelectasis component of infiltrate not excluded   4.  Irregular  hepatic contour favoring changes of cirrhosis   5.  Minimal atherosclerosis      CT-ABDOMEN-PELVIS WITH   Final Result      1.  Decreased gallbladder fossa fluid collection status post percutaneous drain placement now measuring 3.9 x 5.7 cm, previously 10.2 x 6.8 cm.   2.  Small right effusion, partially visualized.   3.  Bilateral lower lobe atelectasis and/or consolidation, underlying infection is possible.   4.  Unchanged trace perihepatic fluid adjacent to the dome of the liver.   5.  Status post cholecystectomy.   6.  Small fat-containing bilateral internal hernias.   7.  Enlarged prostate.   8.  Atherosclerosis.      DX-CHEST-PORTABLE (1 VIEW)   Final Result      Findings suggestive of pulmonary edema with elevation of the right hemidiaphragm and bibasilar atelectasis/infiltrate.      NM-HEPATOBILIARY SCAN   Final Result      No scintigraphy evidence of bile leak during the 60 minute imaging.      CT-IMAGE-GUIDED DRAIN PERITONEAL   Final Result      1.  CT GUIDED PLACEMENT OF A PERCUTANEOUS DRAINAGE CATHETER IN A GALLBLADDER FOSSA FLUID COLLECTION.   2.  THE CURRENT PLAN IS TO MONITOR DRAINAGE OUTPUT AND OBTAIN A FOLLOWUP CT SCAN IN 5-7 DAYS IF CLINICALLY INDICATED.            LABORATORY  Lab Results   Component Value Date    SODIUM 139 04/02/2025    POTASSIUM 4.2 04/02/2025    CHLORIDE 101 04/02/2025    CO2 28 04/02/2025    GLUCOSE 92 04/02/2025    BUN 13 04/02/2025    CREATININE 0.81 04/02/2025        Lab Results   Component Value Date    WBC 8.1 04/02/2025    HEMOGLOBIN 14.0 04/02/2025    HEMATOCRIT 44.5 04/02/2025    PLATELETCT 308 04/02/2025        Total time of the discharge process exceeds 32 minutes.